# Patient Record
Sex: MALE | Race: WHITE | NOT HISPANIC OR LATINO | Employment: OTHER | URBAN - METROPOLITAN AREA
[De-identification: names, ages, dates, MRNs, and addresses within clinical notes are randomized per-mention and may not be internally consistent; named-entity substitution may affect disease eponyms.]

---

## 2018-10-04 ENCOUNTER — HOSPITAL ENCOUNTER (EMERGENCY)
Facility: HOSPITAL | Age: 83
Discharge: HOME | End: 2018-10-04
Attending: FAMILY MEDICINE | Admitting: FAMILY MEDICINE
Payer: COMMERCIAL

## 2018-10-04 VITALS
TEMPERATURE: 97.6 F | RESPIRATION RATE: 17 BRPM | HEART RATE: 88 BPM | WEIGHT: 185 LBS | HEIGHT: 64 IN | SYSTOLIC BLOOD PRESSURE: 180 MMHG | DIASTOLIC BLOOD PRESSURE: 86 MMHG | OXYGEN SATURATION: 98 % | BODY MASS INDEX: 31.58 KG/M2

## 2018-10-04 DIAGNOSIS — I10 HYPERTENSION: Primary | ICD-10-CM

## 2018-10-04 PROCEDURE — 99284 EMERGENCY DEPT VISIT MOD MDM: CPT

## 2018-10-04 PROCEDURE — 94760 N-INVAS EAR/PLS OXIMETRY 1: CPT

## 2018-10-04 PROCEDURE — 94640 AIRWAY INHALATION TREATMENT: CPT

## 2018-10-04 RX ORDER — LISINOPRIL 5 MG/1
5 TABLET ORAL DAILY
COMMUNITY

## 2018-10-04 RX ORDER — ALBUTEROL SULFATE 2.5 MG/3ML
2.5 SOLUTION RESPIRATORY (INHALATION) ONCE
Status: COMPLETED | OUTPATIENT
Start: 2018-10-04 | End: 2018-10-04

## 2018-10-04 RX ORDER — WARFARIN SODIUM 5 MG/1
5 TABLET ORAL
COMMUNITY

## 2018-10-04 RX ORDER — CLONIDINE HYDROCHLORIDE 0.1 MG/1
0.1 TABLET ORAL ONCE
Status: COMPLETED | OUTPATIENT
Start: 2018-10-04 | End: 2018-10-04

## 2018-10-04 RX ORDER — RIVASTIGMINE 4.6 MG/24H
1 PATCH, EXTENDED RELEASE TRANSDERMAL
COMMUNITY

## 2018-10-04 RX ORDER — FUROSEMIDE 20 MG/1
20 TABLET ORAL DAILY
Status: ON HOLD | COMMUNITY
End: 2018-12-13

## 2018-10-04 RX ORDER — ATORVASTATIN CALCIUM 20 MG/1
20 TABLET, FILM COATED ORAL
COMMUNITY

## 2018-10-04 RX ORDER — CLONIDINE HYDROCHLORIDE 0.1 MG/1
0.2 TABLET ORAL ONCE
Status: DISCONTINUED | OUTPATIENT
Start: 2018-10-04 | End: 2018-10-04

## 2018-10-04 RX ORDER — GINSENG 100 MG
1 CAPSULE ORAL ONCE
Status: DISCONTINUED | OUTPATIENT
Start: 2018-10-04 | End: 2018-10-04 | Stop reason: HOSPADM

## 2018-10-04 RX ORDER — ACETAMINOPHEN 325 MG/1
650 TABLET ORAL 2 TIMES DAILY
COMMUNITY

## 2018-10-04 RX ADMIN — ALBUTEROL SULFATE 2.5 MG: 2.5 SOLUTION RESPIRATORY (INHALATION) at 15:49

## 2018-10-04 RX ADMIN — CLONIDINE HYDROCHLORIDE 0.1 MG: 0.1 TABLET ORAL at 16:15

## 2018-10-04 NOTE — ED NOTES
2 ATTEMPTS MADE TO NOTIFY FACILITY ABOUT TREATMENT IN THIS ED & DISCHARGE INSTRUCTIONS, NO ANSWER   [id number] Benson Burnette RN  10/04/18 3931

## 2018-10-04 NOTE — ED PROVIDER NOTES
History  Chief Complaint   Patient presents with    Hypertension    Wheezing       History provided by:  Patient and EMS personnel   used: No    Hypertension   Severity:  Mild  Onset quality:  Sudden  Duration:  1 hour  Timing:  Constant  Progression:  Unchanged  Chronicity:  New  Time since last dose of antihypertensive: Unknown  Context: medication change    Context: not noncompliance and not stress    Relieved by:  None tried  Worsened by:  Nothing  Ineffective treatments:  None tried  Associated symptoms: no anxiety, no confusion, no dizziness, no fatigue, no fever, no palpitations, no syncope, no tinnitus, not vomiting and no weakness    Risk factors: obesity and tobacco use        Prior to Admission Medications   Prescriptions Last Dose Informant Patient Reported? Taking? ASPIRIN PO   Yes No   Sig: Take 325 mg by mouth   DOCUSATE SODIUM PO   Yes No   Sig: Take 100 mg by mouth 2 (two) times a day   acetaminophen (TYLENOL) 325 mg tablet   Yes No   Sig: Take 650 mg by mouth every 6 (six) hours   atorvastatin (LIPITOR) 20 mg tablet   Yes No   Sig: Take 20 mg by mouth   furosemide (LASIX) 20 mg tablet   Yes No   Sig: Take 20 mg by mouth   lisinopril (ZESTRIL) 2 5 mg tablet   Yes Yes   Sig: Take 2 5 mg by mouth daily   rivastigmine (EXELON) 4 6 mg/24 hr TD 24 hr patch   Yes No   Sig: Place 1 patch on the skin   warfarin (COUMADIN) 2 5 mg tablet   Yes No   Sig: Take 2 5 mg by mouth      Facility-Administered Medications: None       Past Medical History:   Diagnosis Date    Anxiety     Dementia     DJD (degenerative joint disease)     Hyperlipidemia     Hypertension        History reviewed  No pertinent surgical history  History reviewed  No pertinent family history  I have reviewed and agree with the history as documented      Social History   Substance Use Topics    Smoking status: Never Smoker    Smokeless tobacco: Never Used    Alcohol use No        Review of Systems Constitutional: Negative for fatigue and fever  HENT: Negative for tinnitus  Respiratory: Negative  Cardiovascular: Negative for palpitations and syncope  Gastrointestinal: Negative for vomiting  Genitourinary: Negative  Musculoskeletal: Negative  Neurological: Negative for dizziness and weakness  Psychiatric/Behavioral: Negative for confusion  The patient is not nervous/anxious  Physical Exam  Physical Exam   Constitutional: He is oriented to person, place, and time  He appears well-developed and well-nourished  HENT:   Head: Normocephalic and atraumatic  Neck: Normal range of motion  Neck supple  Cardiovascular: Normal rate, regular rhythm and normal heart sounds  Pulmonary/Chest: Effort normal  He has wheezes  Abdominal: Soft  Bowel sounds are normal    Neurological: He is alert and oriented to person, place, and time  Skin: Skin is warm  Nursing note and vitals reviewed  Vital Signs  ED Triage Vitals [10/04/18 1542]   Temperature Pulse Respirations Blood Pressure SpO2   97 6 °F (36 4 °C) 70 18 (!) 180/100 98 %      Temp Source Heart Rate Source Patient Position - Orthostatic VS BP Location FiO2 (%)   Tympanic Monitor Sitting Left arm --      Pain Score       No Pain           Vitals:    10/04/18 1542 10/04/18 1628   BP: (!) 180/100 156/76   Pulse: 70 68   Patient Position - Orthostatic VS: Sitting Sitting       Visual Acuity      ED Medications  Medications   albuterol inhalation solution 2 5 mg (2 5 mg Nebulization Given 10/4/18 1549)   cloNIDine (CATAPRES) tablet 0 1 mg (0 1 mg Oral Given 10/4/18 1615)       Diagnostic Studies  Results Reviewed     None                 No orders to display              Procedures  Procedures       Phone Contacts  ED Phone Contact    ED Course  ED Course as of Oct 04 1630   u Oct 04, 2018   1626 Patient blood pressure has improved patient is requesting to be discharged [facility]                                  BENOIT Paris Time    Disposition  Final diagnoses:   Hypertension     Time reflects when diagnosis was documented in both MDM as applicable and the Disposition within this note     Time User Action Codes Description Comment    10/4/2018  4:27 PM Kami, [address] Hypertension       ED Disposition     ED Disposition Condition Comment    Discharge  Asim Andrade discharge to home/self care  Condition at discharge: Stable        Follow-up Information     Follow up With Specialties Details Why [address], [address], Nurse Practitioner In 2 days If symptoms worsen [address] [place] [address]  [phone]            Patient's Medications   Discharge Prescriptions    No medications on file     No discharge procedures on file      ED Provider  Electronically Signed by           Timothy Pollock MD  10/04/18 5236

## 2018-10-04 NOTE — ED NOTES
PATIENT WAS AMBULATING WITH ED TECH TO WHEELCHAIR FOR DISCHARGE, LOST BALANCE AND WAS LOWERED TO THE FLOOR  INCIDENT WAS WITNESSED, PATIENT DID NOT HIT HEAD  NO COMPLAINTS OF PAIN OR DISCOMFORT  TWO SKIN TEARS ON RIGHT ARM OBTAINED FROM WATCH RUBBING ARM   ED PHYSICIAN ASSESSED PATIENT OKAY TO DISCHARGE PATIENT     Salma Joya RN  10/04/18 2556

## 2018-10-04 NOTE — DISCHARGE INSTRUCTIONS
Chronic Hypertension, Ambulatory Care   GENERAL INFORMATION:   Chronic hypertension  is a long-term condition in which your blood pressure (BP) is higher than normal  Your BP is the force of your blood moving against the walls of your arteries  Hypertension is a BP of 140/90 or higher  Common symptoms include the following:   · Headache     · Blurred vision    · Chest pain     · Dizziness or weakness     · Trouble breathing     · Nosebleeds  Seek immediate care for the following symptoms:   · Severe headache or vision loss    · Weakness in an arm or leg    · Confusion or difficulty speaking    · Discomfort in your chest that feels like squeezing, pressure, fullness, or pain    · Suddenly feeling lightheaded or trouble breathing    · Pain or discomfort in your back, neck, jaw, stomach, or arm  Treatment for chronic hypertension  may include medicine to lower your BP  You may also need to make lifestyle changes  Take your medicine exactly as directed  Manage chronic hypertension:   · Take your BP at home  Sit and rest for 5 minutes before you take your BP  Extend your arm and support it on a flat surface  Your arm should be at the same level as your heart  Follow the directions that came with your BP monitor  If possible, take at least 2 BP readings each time  Take your BP at least twice a day at the same times each day, such as morning and evening  Keep a log of your BP readings and bring it to your follow-up visits  · Eat less sodium (salt)  Do not add sodium to your food  Limit foods that are high in sodium, such as canned foods, potato chips, and cold cuts  Your healthcare provider may suggest that you follow the [address] [place] [address] Street  The plan is low in sodium, unhealthy fats, and total fat  It is high in potassium, calcium, and fiber  · Exercise regularly  Exercise at least 30 minutes per day, on most days of the week  This will help decrease your BP   Ask your healthcare provider about the best exercise plan for you  · Limit alcohol  Women should limit alcohol to 1 drink a day  Men should limit alcohol to 2 drinks a day  A drink of alcohol is 12 ounces of beer, 5 ounces of wine, or 1½ ounces of liquor  · Do not smoke  If you smoke, it is never too late to quit  Smoking can increase your BP  Smoking also worsens other health conditions you may have that can increase your risk for hypertension  Ask your healthcare provider for information if you need help quitting  Follow up with your healthcare provider as directed: You will need to return to have your BP checked and to have other lab tests done  Write down your questions so you remember to ask them during your visits  CARE AGREEMENT:   You have the right to help plan your care  Learn about your health condition and how it may be treated  Discuss treatment options with your caregivers to decide what care you want to receive  You always have the right to refuse treatment  The above information is an  only  It is not intended as medical advice for individual conditions or treatments  Talk to your doctor, nurse or pharmacist before following any medical regimen to see if it is safe and effective for you  © 2014 [address] is for End User's use only and may not be sold, redistributed or otherwise used for commercial purposes  All illustrations and images included in CareNotes® are the copyrighted property of [employer] M [employer]  or [employer]

## 2018-11-28 ENCOUNTER — HOSPITAL ENCOUNTER (EMERGENCY)
Facility: HOSPITAL | Age: 83
Discharge: HOME | End: 2018-11-28
Payer: COMMERCIAL

## 2018-11-28 VITALS
RESPIRATION RATE: 18 BRPM | SYSTOLIC BLOOD PRESSURE: 156 MMHG | OXYGEN SATURATION: 100 % | HEART RATE: 72 BPM | WEIGHT: 180 LBS | TEMPERATURE: 96.4 F | BODY MASS INDEX: 28.93 KG/M2 | HEIGHT: 66 IN | DIASTOLIC BLOOD PRESSURE: 88 MMHG

## 2018-11-28 DIAGNOSIS — I10 HYPERTENSION, UNSPECIFIED TYPE: Primary | ICD-10-CM

## 2018-11-28 PROCEDURE — 99283 EMERGENCY DEPT VISIT LOW MDM: CPT

## 2018-11-28 RX ORDER — CLONIDINE HYDROCHLORIDE 0.1 MG/1
0.1 TABLET ORAL ONCE
Status: COMPLETED | OUTPATIENT
Start: 2018-11-28 | End: 2018-11-28

## 2018-11-28 RX ORDER — LISINOPRIL 10 MG/1
5 TABLET ORAL ONCE
Status: DISCONTINUED | OUTPATIENT
Start: 2018-11-28 | End: 2018-11-28

## 2018-11-28 RX ORDER — CLONIDINE HYDROCHLORIDE 0.1 MG/1
0.2 TABLET ORAL ONCE
Status: COMPLETED | OUTPATIENT
Start: 2018-11-28 | End: 2018-11-28

## 2018-11-28 RX ADMIN — CLONIDINE HYDROCHLORIDE 0.2 MG: 0.1 TABLET ORAL at 08:33

## 2018-11-28 RX ADMIN — CLONIDINE HYDROCHLORIDE 0.1 MG: 0.1 TABLET ORAL at 09:13

## 2018-11-28 NOTE — DISCHARGE INSTRUCTIONS
Chronic Hypertension   WHAT YOU NEED TO KNOW:   Hypertension is high blood pressure (BP)  Your BP is the force of your blood moving against the walls of your arteries  Normal BP is less than 120/80  Prehypertension is between 120/80 and 139/89  Hypertension is 140/90 or higher  Hypertension causes your BP to get so high that your heart has to work much harder than normal  This can damage your heart  Chronic hypertension is a long-term condition that you can control with a healthy lifestyle or medicines  A controlled blood pressure helps protect your organs, such as your heart, lungs, brain, and kidneys  DISCHARGE INSTRUCTIONS:   Call 911 for any of the following:   · You have discomfort in your chest that feels like squeezing, pressure, fullness, or pain  · You become confused or have difficulty speaking  · You suddenly feel lightheaded or have trouble breathing  · You have pain or discomfort in your back, neck, jaw, stomach, or arm  Return to the emergency department if:   · You have a severe headache or vision loss  · You have weakness in an arm or leg  Contact your healthcare provider if:   · You feel faint, dizzy, confused, or drowsy  · You have been taking your BP medicine and your BP is still higher than your healthcare provider says it should be  · You have questions or concerns about your condition or care  Medicines: You may need any of the following:  · Medicine  may be used to help lower your BP  You may need more than one type of medicine  Take the medicine exactly as directed  · Diuretics  help decrease extra fluid that collects in your body  This will help lower your BP  You may urinate more often while you take this medicine  · Cholesterol medicine  helps lower your cholesterol level  A low cholesterol level helps prevent heart disease and makes it easier to control your blood pressure  · Take your medicine as directed    Contact your healthcare provider if you think your medicine is not helping or if you have side effects  Tell him or her if you are allergic to any medicine  Keep a list of the medicines, vitamins, and herbs you take  Include the amounts, and when and why you take them  Bring the list or the pill bottles to follow-up visits  Carry your medicine list with you in case of an emergency  Follow up with your healthcare provider as directed: You will need to return to have your blood pressure checked and to have other lab tests done  Write down your questions so you remember to ask them during your visits  Manage chronic hypertension:  Talk with your healthcare provider about these and other ways to manage hypertension:  · Take your BP at home  Sit and rest for 5 minutes before you take your BP  Extend your arm and support it on a flat surface  Your arm should be at the same level as your heart  Follow the directions that came with your BP monitor  If possible, take at least 2 BP readings each time  Take your BP at least twice a day at the same times each day, such as morning and evening  Keep a record of your BP readings and bring it to your follow-up visits  Ask your healthcare provider what your blood pressure should be  · Limit sodium (salt) as directed  Too much sodium can affect your fluid balance  Check labels to find low-sodium or no-salt-added foods  Some low-sodium foods use potassium salts for flavor  Too much potassium can also cause health problems  Your healthcare provider will tell you how much sodium and potassium are safe for you to have in a day  He or she may recommend that you limit sodium to 2,300 mg a day  · Follow the meal plan recommended by your healthcare provider  A dietitian or your provider can give you more information on low-sodium plans or the DASH (Dietary Approaches to Stop Hypertension) eating plan  The DASH plan is low in sodium, unhealthy fats, and total fat  It is high in potassium, calcium, and fiber  · Exercise to maintain a healthy weight  Exercise at least 30 minutes per day, on most days of the week  This will help decrease your blood pressure  Ask about the best exercise plan for you  · Decrease stress  This may help lower your BP  Learn ways to relax, such as deep breathing or listening to music  · Limit alcohol  Women should limit alcohol to 1 drink a day  Men should limit alcohol to 2 drinks a day  A drink of alcohol is 12 ounces of beer, 5 ounces of wine, or 1½ ounces of liquor  · Do not smoke  Nicotine and other chemicals in cigarettes and cigars can increase your BP and also cause lung damage  Ask your healthcare provider for information if you currently smoke and need help to quit  E-cigarettes or smokeless tobacco still contain nicotine  Talk to your healthcare provider before you use these products  © 2017 [address] Information is for End User's use only and may not be sold, redistributed or otherwise used for commercial purposes  All illustrations and images included in CareNotes® are the copyrighted property of [employer]  or [employer]  The above information is an  only  It is not intended as medical advice for individual conditions or treatments  Talk to your doctor, nurse or pharmacist before following any medical regimen to see if it is safe and effective for you  Chronic Hypertension   WHAT YOU NEED TO KNOW:   What is chronic hypertension? Hypertension is high blood pressure (BP)  Your BP is the force of your blood moving against the walls of your arteries  Normal BP is less than 120/80  Prehypertension is between 120/80 and 139/89  Hypertension is 140/90 or higher  Hypertension causes your BP to get so high that your heart has to work much harder than normal  This can damage your heart  Chronic hypertension is a long-term condition that you can control with a healthy lifestyle or medicines   A controlled blood pressure helps protect your organs, such as your heart, lungs, brain, and kidneys  What increases my risk for chronic hypertension? · Age older than 54 years (men)    · Age older than 72 years (women)    · A family history of hypertension or heart disease     · Obesity or lack of exercise     · Eating too much sodium (salt)    · Stress     · Use of tobacco, alcohol, or illegal drugs     · A medical condition, such as diabetes, high cholesterol, or kidney disease    · Medicines, such as steroids or birth control pills  What are the signs and symptoms of chronic hypertension? You may have no signs or symptoms, or you may have any of the following:  · Headache     · Blurred vision    · Chest pain     · Dizziness or weakness     · Trouble breathing     · Nosebleeds  How is chronic hypertension diagnosed? Your healthcare provider will ask about your symptoms and the medicines you take  He will also ask if you have a family history of high blood pressure and about any health conditions you have  He will also check your blood pressure and weight and examine your heart, lungs, and eyes  You may need any of the following tests:  · Blood tests  may help healthcare providers find the cause of your hypertension  Blood tests can also help find other health problems caused by hypertension  · Urine tests  will be done to check your kidneys  How is chronic hypertension treated? Your healthcare provider will recommend lifestyle changes to lower your BP  You may also need the following medicines:  · Medicine  may be used to help lower your B  You may need more than one type of medicine  Take the medicine exactly as directed  · Diuretics  help decrease extra fluid that collects in your body  This will help lower your BP  You may urinate more often while you take this medicine  · Cholesterol medicine  helps lower your cholesterol level   A low cholesterol level helps prevent heart disease and makes it easier to control your blood pressure  What can I do to manage chronic hypertension? Talk with your healthcare provider about these and other ways to manage chronic hypertension:  · Take your BP at home  Sit and rest for 5 minutes before you take your BP  Extend your arm and support it on a flat surface  Your arm should be at the same level as your heart  Follow the directions that came with your BP monitor  If possible, take at least 2 BP readings each time  Take your BP at least twice a day at the same times each day, such as morning and evening  Keep a record of your BP readings and bring it to your follow-up visits  Ask your healthcare provider what your blood pressure should be  · Limit sodium (salt) as directed  Too much sodium can affect your fluid balance  Check labels to find low-sodium or no-salt-added foods  Some low-sodium foods use potassium salts for flavor  Too much potassium can also cause health problems  Your healthcare provider will tell you how much sodium and potassium are safe for you to have in a day  He or she may recommend that you limit sodium to 2,300 mg a day  · Follow the meal plan recommended by your healthcare provider  A dietitian or your provider can give you more information on low-sodium plans or the DASH (Dietary Approaches to Stop Hypertension) eating plan  The DASH plan is low in sodium, unhealthy fats, and total fat  It is high in potassium, calcium, and fiber  · Exercise to maintain a healthy weight  Exercise at least 30 minutes per day, on most days of the week  This will help decrease your blood pressure  Ask about the best exercise plan for you  · Decrease stress  This may help lower your BP  Learn ways to relax, such as deep breathing or listening to music  · Limit alcohol  Women should limit alcohol to 1 drink a day  Men should limit alcohol to 2 drinks a day  A drink of alcohol is 12 ounces of beer, 5 ounces of wine, or 1½ ounces of liquor      · Do not smoke  Nicotine and other chemicals in cigarettes and cigars can increase your BP and also cause lung damage  Ask your healthcare provider for information if you currently smoke and need help to quit  E-cigarettes or smokeless tobacco still contain nicotine  Talk to your healthcare provider before you use these products  Call 911 for any of the following:   · You have discomfort in your chest that feels like squeezing, pressure, fullness, or pain  · You become confused or have difficulty speaking  · You suddenly feel lightheaded or have trouble breathing  · You have pain or discomfort in your back, neck, jaw, stomach, or arm  When should I seek immediate care? · You have a severe headache or vision loss  · You have weakness in an arm or leg  When should I contact my healthcare provider? · You feel faint, dizzy, confused, or drowsy  · You have been taking your BP medicine and your BP is still higher than your healthcare provider says it should be  · You have questions or concerns about your condition or care  CARE AGREEMENT:   You have the right to help plan your care  Learn about your health condition and how it may be treated  Discuss treatment options with your caregivers to decide what care you want to receive  You always have the right to refuse treatment  The above information is an  only  It is not intended as medical advice for individual conditions or treatments  Talk to your doctor, nurse or pharmacist before following any medical regimen to see if it is safe and effective for you  © 2017 [facility] Information is for End User's use only and may not be sold, redistributed or otherwise used for commercial purposes  All illustrations and images included in CareNotes® are the copyrighted property of [employer]  or [employer]

## 2018-11-28 NOTE — ED PROVIDER NOTES
History  Chief Complaint   Patient presents with    High Blood Pressure     Saint Render is a 51-year-old male who was brought to the emergency department by ambulance crew after he was noted to have high blood pressure this morning with a systolic BP of about [id number]  Patient has not taken his morning medications this morning  He denies headache, chest pain, shortness of breath or abdominal pain  History provided by:  Patient, nursing home and EMS personnel   used: No        Prior to Admission Medications   Prescriptions Last Dose Informant Patient Reported? Taking? ASPIRIN PO   Yes No   Sig: Take 325 mg by mouth   DOCUSATE SODIUM PO   Yes No   Sig: Take 100 mg by mouth 2 (two) times a day   acetaminophen (TYLENOL) 325 mg tablet   Yes No   Sig: Take 650 mg by mouth every 6 (six) hours   atorvastatin (LIPITOR) 20 mg tablet   Yes No   Sig: Take 20 mg by mouth   furosemide (LASIX) 20 mg tablet   Yes No   Sig: Take 20 mg by mouth   lisinopril (ZESTRIL) 2 5 mg tablet   Yes No   Sig: Take 5 mg by mouth daily     rivastigmine (EXELON) 4 6 mg/24 hr TD 24 hr patch   Yes No   Sig: Place 1 patch on the skin   warfarin (COUMADIN) 2 5 mg tablet   Yes No   Sig: Take 4 mg by mouth        Facility-Administered Medications: None       Past Medical History:   Diagnosis Date    Anxiety     Dementia     DJD (degenerative joint disease)     Hyperlipidemia     Hypertension        History reviewed  No pertinent surgical history  History reviewed  No pertinent family history  I have reviewed and agree with the history as documented  Social History   Substance Use Topics    Smoking status: Never Smoker    Smokeless tobacco: Never Used    Alcohol use No        Review of Systems   Constitutional: Negative  HENT: Negative  Eyes: Negative  Respiratory: Negative  Cardiovascular: Negative  Gastrointestinal: Negative  Endocrine: Negative  Genitourinary: Negative  Musculoskeletal: Negative  Allergic/Immunologic: Negative  Neurological: Negative  Hematological: Negative  Psychiatric/Behavioral: Negative  Physical Exam  Physical Exam   Constitutional: He is oriented to person, place, and time  He appears well-developed and well-nourished  No distress  HENT:   Head: Normocephalic and atraumatic  Right Ear: External ear normal    Left Ear: External ear normal    Nose: Nose normal    Mouth/Throat: Oropharynx is clear and moist  No oropharyngeal exudate  Eyes: Pupils are equal, round, and reactive to light  Conjunctivae and EOM are normal  Right eye exhibits no discharge  Left eye exhibits no discharge  Neck: Normal range of motion  Neck supple  No tracheal deviation present  No thyromegaly present  Cardiovascular: Normal rate, regular rhythm, normal heart sounds and intact distal pulses  Pulmonary/Chest: Effort normal and breath sounds normal    Abdominal: Soft  Bowel sounds are normal  He exhibits no distension  There is no tenderness  Musculoskeletal: Normal range of motion  He exhibits no edema, tenderness or deformity  Lymphadenopathy:     He has no cervical adenopathy  Neurological: He is alert and oriented to person, place, and time  No cranial nerve deficit or sensory deficit  He exhibits normal muscle tone  Coordination normal    Skin: Skin is warm and dry  No rash noted  He is not diaphoretic  No erythema  No pallor  Psychiatric: He has a normal mood and affect  His behavior is normal  Judgment and thought content normal    Nursing note and vitals reviewed        Vital Signs  ED Triage Vitals [11/28/18 0818]   Temperature Pulse Respirations Blood Pressure SpO2   (!) 96 4 °F (35 8 °C) 72 18 (!) 215/99 100 %      Temp Source Heart Rate Source Patient Position - Orthostatic VS BP Location FiO2 (%)   Temporal Monitor Lying Left arm --      Pain Score       No Pain           Vitals:    11/28/18 0818   BP: (!) 215/99   Pulse: 72 Patient Position - Orthostatic VS: Lying       Visual Acuity      ED Medications  Medications   cloNIDine (CATAPRES) tablet 0 2 mg (0 2 mg Oral Given 11/28/18 0833)   cloNIDine (CATAPRES) tablet 0 1 mg (0 1 mg Oral Given 11/28/18 0913)       Diagnostic Studies  Results Reviewed     None                 No orders to display              Procedures  Procedures       Phone Contacts  ED Phone Contact    ED Course  ED Course as of Nov 28 0950 Wed Nov 28, 2018   4050 Patient is resting comfortably  Patient is not in any distress  He is not complaining of any chest pain, shortness of breath or abdominal pain  MDM  Number of Diagnoses or Management Options  Hypertension, unspecified type: established and worsening  Risk of Complications, Morbidity, and/or Mortality  Presenting problems: low  Diagnostic procedures: low  Management options: low    Patient Progress  Patient progress: improved    CritCare Time    Disposition  Final diagnoses:   Hypertension, unspecified type     Time reflects when diagnosis was documented in both MDM as applicable and the Disposition within this note     Time User Action Codes Description Comment    11/28/2018  9:48 AM Gucci White Add [I10] Hypertension, unspecified type       ED Disposition     ED Disposition Condition Comment    Discharge  Saint Render discharge to home/self care  Condition at discharge: Good        Follow-up Information     Follow up With Specialties Details Why [address], [address] [facility], Nurse Practitioner In 2 days  [address] [place] [address]  [phone]            Patient's Medications   Discharge Prescriptions    No medications on file     No discharge procedures on file      ED Provider  Electronically Signed by           Diane Lama MD  11/28/18 5996

## 2018-11-29 ENCOUNTER — APPOINTMENT (EMERGENCY)
Dept: CT IMAGING | Facility: HOSPITAL | Age: 83
End: 2018-11-29
Payer: COMMERCIAL

## 2018-11-29 ENCOUNTER — HOSPITAL ENCOUNTER (EMERGENCY)
Facility: HOSPITAL | Age: 83
Discharge: HOME | End: 2018-11-29
Payer: COMMERCIAL

## 2018-11-29 ENCOUNTER — APPOINTMENT (EMERGENCY)
Dept: RADIOLOGY | Facility: HOSPITAL | Age: 83
End: 2018-11-29
Payer: COMMERCIAL

## 2018-11-29 VITALS
BODY MASS INDEX: 28.91 KG/M2 | OXYGEN SATURATION: 97 % | RESPIRATION RATE: 16 BRPM | SYSTOLIC BLOOD PRESSURE: 143 MMHG | DIASTOLIC BLOOD PRESSURE: 82 MMHG | HEART RATE: 47 BPM | WEIGHT: 179.9 LBS | HEIGHT: 66 IN | TEMPERATURE: 98 F

## 2018-11-29 DIAGNOSIS — R55 NEAR SYNCOPE: Primary | ICD-10-CM

## 2018-11-29 LAB
ALBUMIN SERPL BCP-MCNC: 4 G/DL (ref 3.5–5.7)
ALP SERPL-CCNC: 80 U/L (ref 55–165)
ALT SERPL W P-5'-P-CCNC: 17 U/L (ref 7–52)
ANION GAP SERPL CALCULATED.3IONS-SCNC: 10 MMOL/L (ref 4–13)
APTT PPP: 44 SECONDS (ref 26–38)
AST SERPL W P-5'-P-CCNC: 21 U/L (ref 13–39)
ATRIAL RATE: 55 BPM
BASOPHILS # BLD AUTO: 0 THOUSANDS/ΜL (ref 0–0.1)
BASOPHILS NFR BLD AUTO: 0 % (ref 0–2)
BILIRUB SERPL-MCNC: 0.4 MG/DL (ref 0.2–1)
BUN SERPL-MCNC: 52 MG/DL (ref 7–25)
CALCIUM SERPL-MCNC: 8.8 MG/DL (ref 8.6–10.5)
CHLORIDE SERPL-SCNC: 104 MMOL/L (ref 98–107)
CO2 SERPL-SCNC: 22 MMOL/L (ref 21–31)
CREAT SERPL-MCNC: 2.43 MG/DL (ref 0.7–1.3)
EOSINOPHIL # BLD AUTO: 0.1 THOUSAND/ΜL (ref 0–0.61)
EOSINOPHIL NFR BLD AUTO: 1 % (ref 0–5)
ERYTHROCYTE [DISTWIDTH] IN BLOOD BY AUTOMATED COUNT: 14.5 % (ref 11.5–14.5)
GFR SERPL CREATININE-BSD FRML MDRD: 23 ML/MIN/1.73SQ M
GLUCOSE SERPL-MCNC: 93 MG/DL (ref 65–140)
GLUCOSE SERPL-MCNC: 99 MG/DL (ref 65–99)
HCT VFR BLD AUTO: 37.4 % (ref 36.5–49.3)
HGB BLD-MCNC: 11.8 G/DL (ref 14–18)
INR PPP: 3.02 (ref 0.9–1.5)
LYMPHOCYTES # BLD AUTO: 0.9 THOUSANDS/ΜL (ref 0.6–4.47)
LYMPHOCYTES NFR BLD AUTO: 11 % (ref 21–51)
MCH RBC QN AUTO: 30.6 PG (ref 26–34)
MCHC RBC AUTO-ENTMCNC: 31.7 G/DL (ref 31–37)
MCV RBC AUTO: 97 FL (ref 81–99)
MONOCYTES # BLD AUTO: 0.8 THOUSAND/ΜL (ref 0.17–1.22)
MONOCYTES NFR BLD AUTO: 10 % (ref 2–12)
NEUTROPHILS # BLD AUTO: 6.4 THOUSANDS/ΜL (ref 1.4–6.5)
NEUTS SEG NFR BLD AUTO: 77 % (ref 42–75)
NRBC BLD AUTO-RTO: 0 /100 WBCS
PLATELET # BLD AUTO: 178 THOUSANDS/UL (ref 149–390)
PMV BLD AUTO: 9.7 FL (ref 8.6–11.7)
POTASSIUM SERPL-SCNC: 4.8 MMOL/L (ref 3.5–5.5)
PROT SERPL-MCNC: 6.5 G/DL (ref 6.4–8.9)
PROTHROMBIN TIME: 35.4 SECONDS (ref 10.2–13)
QRS AXIS: -22 DEGREES
QRSD INTERVAL: 68 MS
QT INTERVAL: 484 MS
QTC INTERVAL: 437 MS
RBC # BLD AUTO: 3.86 MILLION/UL (ref 4.3–5.9)
SODIUM SERPL-SCNC: 136 MMOL/L (ref 134–143)
T WAVE AXIS: 17 DEGREES
TROPONIN I SERPL-MCNC: <0.03 NG/ML
VENTRICULAR RATE: 49 BPM
WBC # BLD AUTO: 8.2 THOUSAND/UL (ref 4.8–10.8)

## 2018-11-29 PROCEDURE — 80053 COMPREHEN METABOLIC PANEL: CPT

## 2018-11-29 PROCEDURE — 99284 EMERGENCY DEPT VISIT MOD MDM: CPT

## 2018-11-29 PROCEDURE — 70450 CT HEAD/BRAIN W/O DYE: CPT

## 2018-11-29 PROCEDURE — 93010 ELECTROCARDIOGRAM REPORT: CPT | Performed by: INTERNAL MEDICINE

## 2018-11-29 PROCEDURE — 71045 X-RAY EXAM CHEST 1 VIEW: CPT

## 2018-11-29 PROCEDURE — 85610 PROTHROMBIN TIME: CPT

## 2018-11-29 PROCEDURE — 84484 ASSAY OF TROPONIN QUANT: CPT

## 2018-11-29 PROCEDURE — 82948 REAGENT STRIP/BLOOD GLUCOSE: CPT

## 2018-11-29 PROCEDURE — 36415 COLL VENOUS BLD VENIPUNCTURE: CPT

## 2018-11-29 PROCEDURE — 85025 COMPLETE CBC W/AUTO DIFF WBC: CPT

## 2018-11-29 PROCEDURE — 85730 THROMBOPLASTIN TIME PARTIAL: CPT

## 2018-11-29 PROCEDURE — 93005 ELECTROCARDIOGRAM TRACING: CPT

## 2018-11-29 NOTE — ED PROVIDER NOTES
History  Chief Complaint   Patient presents with    Fall     Patient fell at the [facility] while going to the bathroom  Oscar Estevez is a 80-year-old male was brought to the emergency department by ambulance crew from a local nursing home facility after he had a near syncopal episode while in the bathroom this afternoon  There was no loss of consciousness  Patient does not know why he was brought to the emergency department  He denies any pain or shortness of breath  Patient takes anticoagulant in the form of Coumadin  History provided by:  Patient, EMS personnel and nursing home   used: No    Syncope   Episode history:  Single  Most recent episode: Today  Timing:  Unable to specify  Progression:  Resolved  Chronicity:  New  Context: not blood draw, not bowel movement, not dehydration, not exertion, not inactivity, not medication change, not with normal activity, not sight of blood, not sitting down, not standing up and not urination    Witnessed: yes    Relieved by:  Nothing  Worsened by:  Nothing  Ineffective treatments:  None tried  Associated symptoms: no anxiety, no chest pain, no confusion, no diaphoresis, no difficulty breathing, no dizziness, no fever, no focal sensory loss, no focal weakness, no headaches, no malaise/fatigue, no nausea, no palpitations, no recent fall, no recent injury, no recent surgery, no rectal bleeding, no seizures, no shortness of breath, no visual change, no vomiting and no weakness        Prior to Admission Medications   Prescriptions Last Dose Informant Patient Reported? Taking?    ASPIRIN PO   Yes No   Sig: Take 325 mg by mouth   DOCUSATE SODIUM PO   Yes No   Sig: Take 100 mg by mouth 2 (two) times a day   acetaminophen (TYLENOL) 325 mg tablet   Yes No   Sig: Take 650 mg by mouth every 6 (six) hours   atorvastatin (LIPITOR) 20 mg tablet   Yes No   Sig: Take 20 mg by mouth   furosemide (LASIX) 20 mg tablet   Yes No   Sig: Take 20 mg by mouth lisinopril (ZESTRIL) 2 5 mg tablet   Yes No   Sig: Take 5 mg by mouth daily     rivastigmine (EXELON) 4 6 mg/24 hr TD 24 hr patch   Yes No   Sig: Place 1 patch on the skin   warfarin (COUMADIN) 2 5 mg tablet   Yes No   Sig: Take 4 mg by mouth        Facility-Administered Medications: None       Past Medical History:   Diagnosis Date    Anxiety     Dementia     DJD (degenerative joint disease)     Hyperlipidemia     Hypertension        No past surgical history on file  No family history on file  I have reviewed and agree with the history as documented  Social History   Substance Use Topics    Smoking status: Never Smoker    Smokeless tobacco: Never Used    Alcohol use No        Review of Systems   Constitutional: Negative for diaphoresis, fever and malaise/fatigue  HENT: Negative  Eyes: Negative  Respiratory: Negative for shortness of breath  Cardiovascular: Positive for syncope  Negative for chest pain and palpitations  Gastrointestinal: Negative for nausea and vomiting  Endocrine: Negative  Genitourinary: Negative  Musculoskeletal: Negative  Skin: Negative  Allergic/Immunologic: Negative  Neurological: Negative for dizziness, focal weakness, seizures, weakness and headaches  Hematological: Negative  Psychiatric/Behavioral: Negative for confusion  Physical Exam  Physical Exam   Constitutional: He is oriented to person, place, and time  He appears well-developed and well-nourished  No distress  HENT:   Head: Normocephalic and atraumatic  Right Ear: External ear normal    Left Ear: External ear normal    Nose: Nose normal    Mouth/Throat: Oropharynx is clear and moist  No oropharyngeal exudate  Eyes: Pupils are equal, round, and reactive to light  Conjunctivae and EOM are normal  Right eye exhibits no discharge  Left eye exhibits no discharge  No scleral icterus  Neck: Normal range of motion  Neck supple  No tracheal deviation present   No thyromegaly present  Cardiovascular: Normal rate, regular rhythm, normal heart sounds and intact distal pulses  Pulmonary/Chest: Effort normal and breath sounds normal  No respiratory distress  Abdominal: Soft  Bowel sounds are normal  He exhibits no distension  There is no tenderness  Musculoskeletal: Normal range of motion  He exhibits no edema, tenderness or deformity  Lymphadenopathy:     He has no cervical adenopathy  Neurological: He is alert and oriented to person, place, and time  No cranial nerve deficit or sensory deficit  He exhibits normal muscle tone  Coordination normal    Skin: Skin is warm and dry  No rash noted  He is not diaphoretic  No erythema  No pallor  Psychiatric: His speech is normal  Thought content normal  His mood appears anxious  He is agitated  Cognition and memory are impaired  He expresses impulsivity  Nursing note and vitals reviewed  Vital Signs  ED Triage Vitals   Temp Pulse Resp BP SpO2   -- -- -- -- --      Temp src Heart Rate Source Patient Position - Orthostatic VS BP Location FiO2 (%)   -- -- -- -- --      Pain Score       --           There were no vitals filed for this visit      Visual Acuity      ED Medications  Medications - No data to display    Diagnostic Studies  Results Reviewed     Procedure Component Value Units Date/Time    CBC and differential [[id number]]     Lab Status:  No result Specimen:  Blood     Protime-INR [[id number]]     Lab Status:  No result Specimen:  Blood     APTT [[id number]]     Lab Status:  No result Specimen:  Blood     Comprehensive metabolic panel [[id number]]     Lab Status:  No result Specimen:  Blood     Troponin I [[id number]]     Lab Status:  No result Specimen:  Blood                  CT head without contrast    (Results Pending)   XR chest 1 view portable    (Results Pending)              Procedures  Procedures       Phone Contacts  ED Phone Contact    ED Course  ED Course as of Nov 29 1858   Thu Nov 29, 2018   Jennifer Mcfarland - [facility] ED care was transferred to Dr Tamera Martinez  [facility]  CritCare Time    Disposition  Final diagnoses:   Near syncope     Time reflects when diagnosis was documented in both MDM as applicable and the Disposition within this note     Time User Action Codes Description Comment    11/29/2018  6:58 PM Ronny Cates Add [R55] Near syncope       ED Disposition     None      Follow-up Information    None         Patient's Medications   Discharge Prescriptions    No medications on file     No discharge procedures on file      ED Provider  Electronically Signed by           Saleem Basurto MD  11/29/18 5410

## 2018-11-30 NOTE — DISCHARGE INSTRUCTIONS
Syncope in Older Adults   WHAT YOU NEED TO KNOW:   Syncope is also called fainting or passing out  Syncope is a sudden, temporary loss of consciousness, followed by a fall from a standing or sitting position  A syncope episode is usually short  DISCHARGE INSTRUCTIONS:   Seek care immediately if:   · You are bleeding because you accidently hit your head after fainting  · You suddenly have double vision, difficulty speaking, numbness, and cannot move your arms or legs  · You have chest pain and trouble breathing  · You vomit blood or material that looks like coffee grounds  · You see blood in your bowel movement  Contact your healthcare provider if:   · You have another fainting spell  · You have a headache, fast heartbeat, or feel too dizzy to stand up  · You have questions or concerns about your condition or care  Follow up with your healthcare provider as directed:  Write down your questions so you remember to ask them during your visits  Manage syncope:   · Keep a record of your syncope episodes  Include your symptoms and your activity before and after the episode  The record can help your healthcare provider find the cause of your syncope and help you manage episodes  · Sit or lie down when needed  This includes when you feel dizzy, your throat is getting tight, and your vision changes  Raise your legs above the level of your heart  Your healthcare provider may also recommend that you keep the head of your bed elevated  This can help keep your blood pressure from dropping too low  · Check your blood pressure often  This is important if you take medicine to lower your blood pressure  Check your blood pressure when you are lying down and when you are standing  Ask how often to check during the day  Keep a record of your blood pressure numbers  Your healthcare provider may use the record to help plan your treatment  · Use assistive devices as directed    Your healthcare provider may suggest that you use a cane or walker to help you keep your balance  You may need to have grab bars put in your bathroom near the toilet or in the shower  Prevent a syncope episode:   · Move slowly and let yourself get used to one position before you move to another position  This is very important when you change from a lying or sitting position to a standing position  Take some deep breaths before you stand up from a lying position  Stand up slowly  Sudden movements may cause a fainting spell  Sit on the side of the bed or couch for a few minutes before you stand up  If you are on bedrest, try to be upright for about 2 hours each day, or as directed  Do not lock your legs if you are standing for a long period of time  Move your legs and bend your knees to keep blood flowing  · Follow your healthcare provider's recommendations  Your provider may  recommend that you drink more liquids to prevent dehydration  You may also need to have more salt to keep your blood pressure from dropping too low and causing syncope  Your provider will tell you how much liquid and sodium to have each day  · Watch for signs of low blood sugar  These include hunger, nervousness, sweating, and fast or fluttery heartbeats  Talk with your healthcare provider about ways to keep your blood sugar level steady  · Do not strain if you are constipated  You may faint if you strain to have a bowel movement  Walking is the best way to get your bowels moving  Eat foods high in fiber to make it easier to have a bowel movement  Good examples are high-fiber cereals, beans, vegetables, and whole-grain breads  Prune juice may help make bowel movements softer  · Be careful in hot weather  Heat can cause a syncope episode  Limit activity done outside on hot days  Physical activity in hot weather can lead to dehydration  This can cause an episode    © 2017 [employer] Information is for End User's use only and may not be sold, redistributed or otherwise used for commercial purposes  All illustrations and images included in CareNotes® are the copyrighted property of [employer]  or [employer]  The above information is an  only  It is not intended as medical advice for individual conditions or treatments  Talk to your doctor, nurse or pharmacist before following any medical regimen to see if it is safe and effective for you  Near Syncope   WHAT YOU NEED TO KNOW:   Near syncope, also called presyncope, is the feeling that you may faint (lose consciousness), but you do not  You can control some health conditions that cause near syncope  Your healthcare providers can help you create a plan to manage near syncope and prevent episodes  DISCHARGE INSTRUCTIONS:   Return to the emergency department if:   · You have sudden chest pain  · You have trouble breathing or shortness of breath  · You have vision changes, are sweating, and have nausea while you are sitting or lying down  · You feel dizzy or flushed and your heart is fluttering  · You lose consciousness  · You cannot use your arm, hand, foot, or leg, or it feels weak  · You have trouble speaking or understanding others when they speak  Contact your healthcare provider if:   · You have new or worsening symptoms  · Your heart beats faster or slower than usual      · You have questions or concerns about your condition or care  Follow up with your healthcare provider as directed: You may need more tests to help find the cause of your near syncope episodes  The tests will help healthcare providers plan the best treatment for you  Write down your questions so you remember to ask them during your visits  Manage near syncope:   · Sit or lie down when needed  This includes when you feel dizzy, your throat is getting tight, and your vision changes  Raise your legs above the level of your heart      · Take slow, deep breaths if you start to breathe faster with anxiety or fear  This can help decrease dizziness and the feeling that you might faint  · Keep a record of your near syncope episodes  Include your symptoms and your activity before and after the episode  The record can help your healthcare provider find the cause of your near syncope and help you manage episodes  Prevent a near syncope episode:   · Move slowly and let yourself get used to one position before you move to another position  This is very important when you change from a lying or sitting position to a standing position  Take some deep breaths before you stand up from a lying position  Stand up slowly  Sudden movements may cause a fainting spell  Sit on the side of the bed or couch for a few minutes before you stand up  If you are on bedrest, try to be upright for about 2 hours each day, or as directed  Do not lock your legs if you are standing for a long period of time  Move your legs and bend your knees to keep blood flowing  · Follow your healthcare provider's recommendations  Your provider may  recommend that you drink more liquids to prevent dehydration  You may also need to have more salt to keep your blood pressure from dropping too low and causing syncope  Your provider will tell you how much liquid and sodium to have each day  · Watch for signs of low blood sugar  These include hunger, nervousness, sweating, and fast or fluttery heartbeats  Talk with your healthcare provider about ways to keep your blood sugar level steady  · Check your blood pressure often  This is important if you take medicine to lower your blood pressure  Check your blood pressure when you are lying down and when you are standing  Ask how often to check during the day  Keep a record of your blood pressure numbers  Your healthcare provider may use the record to help plan your treatment  · Do not strain if you are constipated    You may faint if you strain to have a bowel movement  Walking is the best way to get your bowels moving  Eat foods high in fiber to make it easier to have a bowel movement  Good examples are high-fiber cereals, beans, vegetables, and whole-grain breads  Prune juice may help make bowel movements softer  · Do not exercise outside on a hot day  The combination of physical activity and heat can lead to dehydration  This can cause syncope  © 2017 [employer]  Information is for End User's use only and may not be sold, redistributed or otherwise used for commercial purposes  All illustrations and images included in CareNotes® are the copyrighted property of A [employer]  or [employer]  The above information is an  only  It is not intended as medical advice for individual conditions or treatments  Talk to your doctor, nurse or pharmacist before following any medical regimen to see if it is safe and effective for you

## 2018-11-30 NOTE — ED NOTES
Patient is resting comfortably  Linens were changed, and patient repositioned       Pro López RN  11/29/18 2834

## 2018-11-30 NOTE — ED PROVIDER NOTES
History  Chief Complaint   Patient presents with    Fall     Patient fell at the [facility] while going to the bathroom  HPI    Prior to Admission Medications   Prescriptions Last Dose Informant Patient Reported? Taking? ASPIRIN PO 11/29/2018 at Unknown time  Yes Yes   Sig: Take 325 mg by mouth   DOCUSATE SODIUM PO   Yes No   Sig: Take 100 mg by mouth 2 (two) times a day   acetaminophen (TYLENOL) 325 mg tablet   Yes No   Sig: Take 650 mg by mouth every 6 (six) hours   atorvastatin (LIPITOR) 20 mg tablet   Yes No   Sig: Take 20 mg by mouth   furosemide (LASIX) 20 mg tablet   Yes No   Sig: Take 20 mg by mouth   lisinopril (ZESTRIL) 2 5 mg tablet   Yes No   Sig: Take 5 mg by mouth daily     rivastigmine (EXELON) 4 6 mg/24 hr TD 24 hr patch   Yes No   Sig: Place 1 patch on the skin   warfarin (COUMADIN) 2 5 mg tablet Unknown at Unknown time  Yes No   Sig: Take 4 mg by mouth        Facility-Administered Medications: None       Past Medical History:   Diagnosis Date    Anxiety     Dementia     DJD (degenerative joint disease)     Hyperlipidemia     Hypertension     Psychiatric disorder     anxiety depresion        History reviewed  No pertinent surgical history  History reviewed  No pertinent family history  I have reviewed and agree with the history as documented      Social History   Substance Use Topics    Smoking status: Never Smoker    Smokeless tobacco: Never Used    Alcohol use No        Review of Systems    Physical Exam  Physical Exam    Vital Signs  ED Triage Vitals [11/29/18 1853]   Temperature Pulse Respirations Blood Pressure SpO2   97 6 °F (36 4 °C) 58 18 123/70 97 %      Temp Source Heart Rate Source Patient Position - Orthostatic VS BP Location FiO2 (%)   Temporal -- Lying Left arm --      Pain Score       No Pain           Vitals:    11/29/18 1853   BP: 123/70   Pulse: 58   Patient Position - Orthostatic VS: Lying       Visual Acuity  Visual Acuity      Most Recent Value   L Pupil Size (mm)  3   R Pupil Size (mm)  3   L Pupil Shape  Round   R Pupil Shape  Round          ED Medications  Medications - No data to display    Diagnostic Studies  Results Reviewed     Procedure Component Value Units Date/Time    CBC and differential [[id number]]     Lab Status:  No result Specimen:  Blood     Protime-INR [[id number]]     Lab Status:  No result Specimen:  Blood     APTT [[id number]]     Lab Status:  No result Specimen:  Blood     Comprehensive metabolic panel [[id number]]     Lab Status:  No result Specimen:  Blood     Troponin I [[id number]]     Lab Status:  No result Specimen:  Blood                  CT head without contrast    (Results Pending)   XR chest 1 view portable    (Results Pending)              Procedures  ECG 12 Lead Documentation  Date/Time: 11/29/2018 7:02 PM  Performed by: Li Neff  Authorized by: Ryan AGUILAR     ECG reviewed by me, the ED Provider: yes    Patient location:  ED  Previous ECG:     Previous ECG:  Unavailable    Comparison to cardiac monitor: No    Interpretation:     Interpretation: abnormal    Rate:     ECG rate:  50    ECG rate assessment: bradycardic    Rhythm:     Rhythm: sinus bradycardia    Ectopy:     Ectopy: none    QRS:     QRS axis:  Normal    QRS intervals:  Normal  ST segments:     ST segments:  Normal  T waves:     T waves: normal             Phone Contacts  ED Phone Contact    ED Course   This patient has been signed out by Dr Pricila Jarrett  Co the outgoing emergency department physician  Reference is made to the medical record composed by Dr Pricila Jarrett,  Entries to the record by this emergency physician are for the purposes of follow-up studies to include laboratories and CT scans as well as disposition  AT 8:45PM, THE PATIENT'S STFDIES RETURNED UNREMARKABLE  THE CT SCAN OF THE BRAIN AND PLAIN XRAYS OF THE CHEST RETURNED UNREMARKABLE                            [facility]  CritCare Time    Disposition  Final diagnoses:   Near syncope     Time reflects when diagnosis was documented in both MDM as applicable and the Disposition within this note     Time User Action Codes Description Comment    11/29/2018  6:58 PM Marjorie Cates Add [R55] Near syncope       ED Disposition     None      Follow-up Information    None         Patient's Medications   Discharge Prescriptions    No medications on file     No discharge procedures on file      ED Provider  Electronically Signed by           Candida Shah MD  11/29/18 Ben Ruelas MD  11/29/18 1916       Candida Shah MD  11/29/18 2050

## 2018-12-05 ENCOUNTER — APPOINTMENT (EMERGENCY)
Dept: CT IMAGING | Facility: HOSPITAL | Age: 83
End: 2018-12-05
Payer: COMMERCIAL

## 2018-12-05 ENCOUNTER — HOSPITAL ENCOUNTER (INPATIENT)
Facility: HOSPITAL | Age: 83
LOS: 8 days | Discharge: NURSING, REHAB OR LONG-TERM CARE | DRG: 552 | End: 2018-12-13
Attending: SURGERY | Admitting: SURGERY
Payer: COMMERCIAL

## 2018-12-05 ENCOUNTER — HOSPITAL ENCOUNTER (EMERGENCY)
Facility: HOSPITAL | Age: 83
End: 2018-12-05
Attending: EMERGENCY MEDICINE
Payer: COMMERCIAL

## 2018-12-05 VITALS
HEART RATE: 76 BPM | SYSTOLIC BLOOD PRESSURE: 145 MMHG | OXYGEN SATURATION: 98 % | DIASTOLIC BLOOD PRESSURE: 72 MMHG | TEMPERATURE: 98.4 F | RESPIRATION RATE: 16 BRPM

## 2018-12-05 DIAGNOSIS — S32.009A CLOSED FRACTURE OF LUMBAR VERTEBRAL BODY (HCC): Primary | ICD-10-CM

## 2018-12-05 DIAGNOSIS — S32.009A: Primary | ICD-10-CM

## 2018-12-05 DIAGNOSIS — G30.1 LATE ONSET ALZHEIMER'S DISEASE WITHOUT BEHAVIORAL DISTURBANCE (HCC): ICD-10-CM

## 2018-12-05 DIAGNOSIS — S22.009A: ICD-10-CM
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
## 2018-12-05 DIAGNOSIS — F02.80 LATE ONSET ALZHEIMER'S DISEASE WITHOUT BEHAVIORAL DISTURBANCE (HCC): ICD-10-CM

## 2018-12-05 DIAGNOSIS — R26.2 AMBULATORY DYSFUNCTION: ICD-10-CM

## 2018-12-05 PROBLEM — S22.000A COMPRESSION FRACTURE OF BODY OF THORACIC VERTEBRA (HCC): Status: ACTIVE | Noted: 2018-12-05

## 2018-12-05 PROBLEM — S30.1XXA TRAUMATIC ECCHYMOSIS OF FLANK: Status: ACTIVE | Noted: 2018-12-05

## 2018-12-05 PROBLEM — D68.9 COAGULOPATHY (HCC): Status: ACTIVE | Noted: 2018-12-05

## 2018-12-05 LAB
ANION GAP SERPL CALCULATED.3IONS-SCNC: 9 MMOL/L (ref 4–13)
APTT PPP: 37 SECONDS (ref 26–38)
ATRIAL RATE: 104 BPM
BASOPHILS # BLD AUTO: 0 THOUSANDS/ΜL (ref 0–0.1)
BASOPHILS NFR BLD AUTO: 1 % (ref 0–2)
BUN SERPL-MCNC: 45 MG/DL (ref 7–25)
CALCIUM SERPL-MCNC: 9 MG/DL (ref 8.6–10.5)
CHLORIDE SERPL-SCNC: 104 MMOL/L (ref 98–107)
CK SERPL-CCNC: 95 U/L (ref 30–308)
CO2 SERPL-SCNC: 26 MMOL/L (ref 21–31)
CREAT SERPL-MCNC: 1.69 MG/DL (ref 0.7–1.3)
EOSINOPHIL # BLD AUTO: 0.1 THOUSAND/ΜL (ref 0–0.61)
EOSINOPHIL NFR BLD AUTO: 1 % (ref 0–5)
ERYTHROCYTE [DISTWIDTH] IN BLOOD BY AUTOMATED COUNT: 14.3 % (ref 11.5–14.5)
GFR SERPL CREATININE-BSD FRML MDRD: 35 ML/MIN/1.73SQ M
GLUCOSE SERPL-MCNC: 96 MG/DL (ref 65–99)
HCT VFR BLD AUTO: 36.9 % (ref 36.5–49.3)
HGB BLD-MCNC: 11.7 G/DL (ref 14–18)
INR PPP: 2.83 (ref 0.9–1.5)
LYMPHOCYTES # BLD AUTO: 0.7 THOUSANDS/ΜL (ref 0.6–4.47)
LYMPHOCYTES NFR BLD AUTO: 9 % (ref 21–51)
MCH RBC QN AUTO: 30.6 PG (ref 26–34)
MCHC RBC AUTO-ENTMCNC: 31.7 G/DL (ref 31–37)
MCV RBC AUTO: 97 FL (ref 81–99)
MONOCYTES # BLD AUTO: 1 THOUSAND/ΜL (ref 0.17–1.22)
MONOCYTES NFR BLD AUTO: 12 % (ref 2–12)
NEUTROPHILS # BLD AUTO: 6.6 THOUSANDS/ΜL (ref 1.4–6.5)
NEUTS SEG NFR BLD AUTO: 78 % (ref 42–75)
NRBC BLD AUTO-RTO: 0 /100 WBCS
PLATELET # BLD AUTO: 224 THOUSANDS/UL (ref 149–390)
PMV BLD AUTO: 9.5 FL (ref 8.6–11.7)
POTASSIUM SERPL-SCNC: 5.2 MMOL/L (ref 3.5–5.5)
PROTHROMBIN TIME: 33.2 SECONDS (ref 10.2–13)
QRS AXIS: -40 DEGREES
QRSD INTERVAL: 66 MS
QT INTERVAL: 376 MS
QTC INTERVAL: 447 MS
RBC # BLD AUTO: 3.82 MILLION/UL (ref 4.3–5.9)
SODIUM SERPL-SCNC: 139 MMOL/L (ref 134–143)
T WAVE AXIS: 46 DEGREES
TROPONIN I SERPL-MCNC: <0.03 NG/ML
VENTRICULAR RATE: 85 BPM
WBC # BLD AUTO: 8.5 THOUSAND/UL (ref 4.8–10.8)

## 2018-12-05 PROCEDURE — 93010 ELECTROCARDIOGRAM REPORT: CPT | Performed by: INTERNAL MEDICINE

## 2018-12-05 PROCEDURE — 85610 PROTHROMBIN TIME: CPT | Performed by: EMERGENCY MEDICINE

## 2018-12-05 PROCEDURE — 90715 TDAP VACCINE 7 YRS/> IM: CPT | Performed by: EMERGENCY MEDICINE

## 2018-12-05 PROCEDURE — 99222 1ST HOSP IP/OBS MODERATE 55: CPT | Performed by: SURGERY

## 2018-12-05 PROCEDURE — 85025 COMPLETE CBC W/AUTO DIFF WBC: CPT | Performed by: EMERGENCY MEDICINE

## 2018-12-05 PROCEDURE — 85730 THROMBOPLASTIN TIME PARTIAL: CPT | Performed by: EMERGENCY MEDICINE

## 2018-12-05 PROCEDURE — 71250 CT THORAX DX C-: CPT

## 2018-12-05 PROCEDURE — 84484 ASSAY OF TROPONIN QUANT: CPT | Performed by: EMERGENCY MEDICINE

## 2018-12-05 PROCEDURE — 80048 BASIC METABOLIC PNL TOTAL CA: CPT | Performed by: EMERGENCY MEDICINE

## 2018-12-05 PROCEDURE — 99285 EMERGENCY DEPT VISIT HI MDM: CPT

## 2018-12-05 PROCEDURE — 36415 COLL VENOUS BLD VENIPUNCTURE: CPT | Performed by: EMERGENCY MEDICINE

## 2018-12-05 PROCEDURE — 72125 CT NECK SPINE W/O DYE: CPT

## 2018-12-05 PROCEDURE — 82550 ASSAY OF CK (CPK): CPT | Performed by: EMERGENCY MEDICINE

## 2018-12-05 PROCEDURE — 74176 CT ABD & PELVIS W/O CONTRAST: CPT

## 2018-12-05 PROCEDURE — 96374 THER/PROPH/DIAG INJ IV PUSH: CPT

## 2018-12-05 PROCEDURE — 93005 ELECTROCARDIOGRAM TRACING: CPT

## 2018-12-05 RX ORDER — MORPHINE SULFATE 4 MG/ML
4 INJECTION, SOLUTION INTRAMUSCULAR; INTRAVENOUS EVERY 4 HOURS PRN
Status: DISCONTINUED | OUTPATIENT
Start: 2018-12-05 | End: 2018-12-06

## 2018-12-05 RX ORDER — LORAZEPAM 0.5 MG/1
TABLET ORAL 3 TIMES DAILY
COMMUNITY
End: 2018-12-13 | Stop reason: HOSPADM

## 2018-12-05 RX ORDER — FUROSEMIDE 20 MG/1
20 TABLET ORAL DAILY
Status: DISCONTINUED | OUTPATIENT
Start: 2018-12-06 | End: 2018-12-11

## 2018-12-05 RX ORDER — DOCUSATE SODIUM 100 MG/1
100 CAPSULE, LIQUID FILLED ORAL 2 TIMES DAILY
Status: DISCONTINUED | OUTPATIENT
Start: 2018-12-05 | End: 2018-12-13 | Stop reason: HOSPADM

## 2018-12-05 RX ORDER — LIDOCAINE 50 MG/G
1 PATCH TOPICAL DAILY
Status: DISCONTINUED | OUTPATIENT
Start: 2018-12-06 | End: 2018-12-05

## 2018-12-05 RX ORDER — LISINOPRIL 5 MG/1
5 TABLET ORAL DAILY
Status: DISCONTINUED | OUTPATIENT
Start: 2018-12-06 | End: 2018-12-11

## 2018-12-05 RX ORDER — METHOCARBAMOL 500 MG/1
500 TABLET, FILM COATED ORAL EVERY 6 HOURS PRN
Status: DISCONTINUED | OUTPATIENT
Start: 2018-12-05 | End: 2018-12-06

## 2018-12-05 RX ORDER — ACETAMINOPHEN 325 MG/1
650 TABLET ORAL EVERY 6 HOURS
Status: DISCONTINUED | OUTPATIENT
Start: 2018-12-05 | End: 2018-12-06

## 2018-12-05 RX ORDER — MEMANTINE HYDROCHLORIDE 5 MG-10 MG
28 KIT ORAL SEE ADMIN INSTRUCTIONS
Status: ON HOLD | COMMUNITY
End: 2018-12-06 | Stop reason: CLARIF

## 2018-12-05 RX ORDER — RIVASTIGMINE 4.6 MG/24H
1 PATCH, EXTENDED RELEASE TRANSDERMAL DAILY
Status: DISCONTINUED | OUTPATIENT
Start: 2018-12-06 | End: 2018-12-13 | Stop reason: HOSPADM

## 2018-12-05 RX ORDER — WARFARIN SODIUM 2 MG/1
4 TABLET ORAL
Status: DISCONTINUED | OUTPATIENT
Start: 2018-12-05 | End: 2018-12-13 | Stop reason: HOSPADM

## 2018-12-05 RX ORDER — ATORVASTATIN CALCIUM 20 MG/1
20 TABLET, FILM COATED ORAL
Status: DISCONTINUED | OUTPATIENT
Start: 2018-12-06 | End: 2018-12-13 | Stop reason: HOSPADM

## 2018-12-05 RX ORDER — LIDOCAINE 50 MG/G
1 PATCH TOPICAL DAILY
Status: DISCONTINUED | OUTPATIENT
Start: 2018-12-05 | End: 2018-12-13 | Stop reason: HOSPADM

## 2018-12-05 RX ORDER — SODIUM CHLORIDE 9 MG/ML
125 INJECTION, SOLUTION INTRAVENOUS CONTINUOUS
Status: DISCONTINUED | OUTPATIENT
Start: 2018-12-05 | End: 2018-12-05 | Stop reason: HOSPADM

## 2018-12-05 RX ORDER — LIDOCAINE 4 G/G
PATCH TOPICAL
COMMUNITY
End: 2019-03-06

## 2018-12-05 RX ORDER — ASPIRIN 81 MG/1
324 TABLET, CHEWABLE ORAL DAILY
Status: DISCONTINUED | OUTPATIENT
Start: 2018-12-06 | End: 2018-12-13 | Stop reason: HOSPADM

## 2018-12-05 RX ADMIN — ACETAMINOPHEN 650 MG: 325 TABLET, FILM COATED ORAL at 22:02

## 2018-12-05 RX ADMIN — TETANUS TOXOID, REDUCED DIPHTHERIA TOXOID AND ACELLULAR PERTUSSIS VACCINE, ADSORBED 0.5 ML: 5; 2.5; 8; 8; 2.5 SUSPENSION INTRAMUSCULAR at 21:59

## 2018-12-05 RX ADMIN — SODIUM CHLORIDE 125 ML/HR: 0.9 INJECTION, SOLUTION INTRAVENOUS at 18:32

## 2018-12-05 RX ADMIN — DOCUSATE SODIUM 100 MG: 100 CAPSULE, LIQUID FILLED ORAL at 22:02

## 2018-12-05 RX ADMIN — MORPHINE SULFATE 4 MG: 4 INJECTION, SOLUTION INTRAMUSCULAR; INTRAVENOUS at 21:57

## 2018-12-05 RX ADMIN — LIDOCAINE 1 PATCH: 50 PATCH CUTANEOUS at 22:05

## 2018-12-05 RX ADMIN — MORPHINE SULFATE 2 MG: 2 INJECTION, SOLUTION INTRAMUSCULAR; INTRAVENOUS at 17:06

## 2018-12-05 NOTE — EMTALA/ACUTE CARE TRANSFER
[facility] [address]   [id number]  [phone]  Dept: [phone]      [address] TRANSFER CONSENT    NAME Nicholas Ricketts                                         1927                              MRN [id number]    I have been informed of my rights regarding examination, treatment, and transfer   by Dr Jessica Reyes MD    Benefits: Specialized equipment and/or services available at the receiving facility (Include comment)________________________ (trauma)    Risks: Potential deterioration of medical condition, Increased discomfort during transfer      Consent for Transfer:  I acknowledge that my medical condition has been evaluated and explained to me by the emergency department physician or other qualified medical person and/or my attending physician, who has recommended that I be transferred to the service of  Accepting Physician: Dr Latasha Lester at [facility] Name, [address] : [facility]  The above potential benefits of such transfer, the potential risks associated with such transfer, and the probable risks of not being transferred have been explained to me, and I fully understand them  The doctor has explained that, in my case, the benefits of transfer outweigh the risks  I agree to be transferred  I authorize the performance of emergency medical procedures and treatments upon me in both transit and upon arrival at the receiving facility  Additionally, I authorize the release of any and all medical records to the receiving facility and request they be transported with me, if possible  I understand that the safest mode of transportation during a medical emergency is an ambulance and that the Hospital advocates the use of this mode of transport   Risks of traveling to the receiving facility by car, including absence of medical control, life sustaining equipment, such as oxygen, and medical personnel has been explained to me and I fully understand them  ([place] CORRECT BOX BELOW)  [  ]  I consent to the stated transfer and to be transported by ambulance/helicopter  [  ]  I consent to the stated transfer, but refuse transportation by ambulance and accept full responsibility for my transportation by car  I understand the risks of non-ambulance transfers and I exonerate the Hospital and its staff from any deterioration in my condition that results from this refusal     X___________________________________________    DATE  18  TIME________  Signature of patient or legally responsible individual signing on patient behalf           RELATIONSHIP TO PATIENT_________________________          Provider Certification    NAME Roberth Alonso                                        [facility] 1927                              MRN [id number]    A medical screening exam was performed on the above named patient  Based on the examination:    Condition Necessitating Transfer The primary encounter diagnosis was Fracture lumbar vertebra-closed ([address] )  A diagnosis of Fracture, thoracic vertebra (HCC) was also pertinent to this visit      Patient Condition: The patient has been stabilized such that within reasonable medical probability, no material deterioration of the patient condition or the condition of the unborn child(hanna) is likely to result from the transfer    Reason for Transfer: Level of Care needed not available at this facility (Trauma)    Transfer Requirements: [address]   · Space available and qualified personnel available for treatment as acknowledged by [address] [place] [address]  · Agreed to accept transfer and to provide appropriate medical treatment as acknowledged by       Dr Garett Franklin  · Appropriate medical records of the examination and treatment of the patient are provided at the time of transfer   [facility],[address] _______  · Transfer will be performed by qualified personnel from [facility]  and appropriate transfer equipment as required, including the use of necessary and appropriate life support measures  Provider Certification: I have examined the patient and explained the following risks and benefits of being transferred/refusing transfer to the patient/family:  General risk, such as traffic hazards, adverse weather conditions, rough terrain or turbulence, possible failure of equipment (including vehicle or aircraft), or consequences of actions of persons outside the control of the transport personnel, Risk of worsening condition, The possibility of a transport vehicle being unavailable      Based on these reasonable risks and benefits to the patient and/or the unborn child(hanna), and based upon the information available at the time of the patients examination, I certify that the medical benefits reasonably to be expected from the provision of appropriate medical treatments at another medical facility outweigh the increasing risks, if any, to the individuals medical condition, and in the case of labor to the unborn child, from effecting the transfer      X____________________________________________ DATE 12/05/18        TIME_______      ORIGINAL - SEND TO MEDICAL RECORDS   COPY - SEND WITH PATIENT DURING TRANSFER

## 2018-12-05 NOTE — ED PROVIDER NOTES
History  Chief Complaint   Patient presents with    Back Pain     80-year-old male sent from [facility] with intractable back pain status post fall several days ago with an indeterminate age T10 fracture  Patient complains of mid to low back pain with some radiation into his thighs  No focal weakness or numbness  No urinary incontinence  Patient denies headache or neck pain  No chest pain or shortness of breath  No abdominal pain  No nausea vomiting  Noon dysuria, no fever, no cough  Pt had CT head and labs 11/29 and outpatient spine x-rays 12/4 showing T10 Fx  Pt unable to walk secondary to pain        History provided by:  Patient  Back Pain   Location:  Lumbar spine and thoracic spine  Quality:  Aching  Timing:  Constant  Relieved by:  Nothing  Worsened by: Movement  Associated symptoms: no abdominal pain, no bladder incontinence, no bowel incontinence, no chest pain, no dysuria, no fever, no headaches, no numbness, no paresthesias and no weakness        Prior to Admission Medications   Prescriptions Last Dose Informant Patient Reported? Taking? ASPIRIN PO   Yes No   Sig: Take 325 mg by mouth   DOCUSATE SODIUM PO   Yes No   Sig: Take 100 mg by mouth 2 (two) times a day   LORazepam (ATIVAN) 0 5 mg tablet   Yes Yes   Sig: Take by mouth 3 (three) times a day   Lidocaine (ASPERCREME LIDOCAINE) 4 % PTCH   Yes Yes   Sig: Apply topically   acetaminophen (TYLENOL) 325 mg tablet   Yes Yes   Sig: Take 650 mg by mouth every 6 (six) hours   atorvastatin (LIPITOR) 20 mg tablet   Yes Yes   Sig: Take 20 mg by mouth   furosemide (LASIX) 20 mg tablet   Yes Yes   Sig: Take 20 mg by mouth   lisinopril (ZESTRIL) 2 5 mg tablet   Yes Yes   Sig: Take 5 mg by mouth daily     memantine (NAMENDA TITRATION PACK)   Yes Yes   Sig: Take 28 mg by mouth see administration instructions Follow package directions     rivastigmine (EXELON) 4 6 mg/24 hr TD 24 hr patch   Yes Yes   Sig: Place 1 patch on the skin   sertraline (ZOLOFT) 50 mg tablet   Yes Yes   Sig: Take 50 mg by mouth daily   warfarin (COUMADIN) 2 5 mg tablet   Yes Yes   Sig: Take 4 mg by mouth        Facility-Administered Medications: None       Past Medical History:   Diagnosis Date    Anxiety     Dementia     DJD (degenerative joint disease)     Hyperlipidemia     Hypertension     Psychiatric disorder     anxiety depresion        History reviewed  No pertinent surgical history  History reviewed  No pertinent family history  I have reviewed and agree with the history as documented  Social History   Substance Use Topics    Smoking status: Never Smoker    Smokeless tobacco: Never Used    Alcohol use No        Review of Systems   Constitutional: Negative for chills and fever  HENT: Negative for rhinorrhea and sore throat  Eyes: Negative for visual disturbance  Respiratory: Negative for cough and shortness of breath  Cardiovascular: Negative for chest pain and leg swelling  Gastrointestinal: Negative for abdominal pain, bowel incontinence, diarrhea, nausea and vomiting  Genitourinary: Negative for bladder incontinence, dysuria and frequency  No incontinence   Musculoskeletal: Positive for back pain  Negative for myalgias and neck pain  Skin:        Healing abrasions   Neurological: Negative for dizziness, syncope, facial asymmetry, speech difficulty, weakness, light-headedness, numbness, headaches and paresthesias  Psychiatric/Behavioral: Negative for confusion  All other systems reviewed and are negative  Physical Exam  Physical Exam   Constitutional: He is oriented to person, place, and time  He appears well-developed and well-nourished  HENT:   Head: Normocephalic and atraumatic  Nose: Nose normal    Mouth/Throat: No oropharyngeal exudate  Mucous membranes slightly dry, pharynx is clear without erythema   Eyes: Pupils are equal, round, and reactive to light  Conjunctivae and EOM are normal  No scleral icterus     Neck: Neck supple  No JVD present  No tracheal deviation present  C-spine nontender without bony step-off   Cardiovascular: Normal rate  Murmur (2/6 systolic murmur) heard  Irregularly irregular   Pulmonary/Chest: Effort normal and breath sounds normal  No respiratory distress  He has no wheezes  He has no rales  He exhibits no tenderness  Abdominal: Soft  Bowel sounds are normal  There is no tenderness  There is no guarding  No pulsatile mass   Musculoskeletal: Normal range of motion  He exhibits no edema, tenderness or deformity  Old ecchymosis right hip, good range of motion and nontender; patient tender low T-spine and lumbar spine; negative straight leg raise   Neurological: He is alert and oriented to person, place, and time  No cranial nerve deficit or sensory deficit  He exhibits normal muscle tone  5/5 motor, nl sens   Skin: Skin is warm and dry  Healing abrasions and skin tears right upper extremity   Psychiatric: He has a normal mood and affect  His behavior is normal    Nursing note and vitals reviewed        Vital Signs  ED Triage Vitals [12/05/18 1542]   Temperature Pulse Respirations Blood Pressure SpO2   98 4 °F (36 9 °C) 76 16 145/72 98 %      Temp src Heart Rate Source Patient Position - Orthostatic VS BP Location FiO2 (%)   -- -- -- -- --      Pain Score       --           Vitals:    12/05/18 1542   BP: 145/72   Pulse: 76       Visual Acuity      ED Medications  Medications   sodium chloride 0 9 % infusion (not administered)   morphine injection 2 mg (2 mg Intravenous Given 12/5/18 1706)       Diagnostic Studies  Results Reviewed     Procedure Component Value Units Date/Time    Troponin I [[id number]]  (Normal) Collected:  12/05/18 1628    Lab Status:  Final result Specimen:  Blood from Arm, Right Updated:  12/05/18 1657     Troponin I <0 03 ng/mL     CK Total with Reflex CKMB [[id number]]  (Normal) Collected:  12/05/18 1628    Lab Status:  Final result Specimen:  Blood from Arm, Right Updated:  12/05/18 1653     Total CK 95 U/L     Basic metabolic panel [[id number]]  (Abnormal) Collected:  12/05/18 1628    Lab Status:  Final result Specimen:  Blood from Arm, Right Updated:  12/05/18 1653     Sodium 139 mmol/L      Potassium 5 2 mmol/L      Chloride 104 mmol/L      CO2 26 mmol/L      ANION GAP 9 mmol/L      BUN 45 (H) mg/dL      Creatinine 1 69 (H) mg/dL      Glucose 96 mg/dL      Calcium 9 0 mg/dL      eGFR 35 ml/min/1 73sq m     Narrative:         National Kidney Disease Education Program recommendations are as follows:  GFR calculation is accurate only with a steady state creatinine  Chronic Kidney disease less than 60 ml/min/1 73 sq  meters  Kidney failure less than 15 ml/min/1 73 sq  meters      APTT [[id number]]  (Normal) Collected:  12/05/18 1628    Lab Status:  Final result Specimen:  Blood from Arm, Right Updated:  12/05/18 1648     PTT 37 seconds     Protime-INR [[id number]]  (Abnormal) Collected:  12/05/18 1628    Lab Status:  Final result Specimen:  Blood from Arm, Right Updated:  12/05/18 1648     Protime 33 2 (H) seconds      INR 2 83 (H)    CBC and differential [[id number]]  (Abnormal) Collected:  12/05/18 1628    Lab Status:  Final result Specimen:  Blood from Arm, Right Updated:  12/05/18 1635     WBC 8 50 Thousand/uL      RBC 3 82 (L) Million/uL      Hemoglobin 11 7 (L) g/dL      Hematocrit 36 9 %      MCV 97 fL      MCH 30 6 pg      MCHC 31 7 g/dL      RDW 14 3 %      MPV 9 5 fL      Platelets 764 Thousands/uL      nRBC 0 /100 WBCs      Neutrophils Relative 78 (H) %      Lymphocytes Relative 9 (L) %      Monocytes Relative 12 %      Eosinophils Relative 1 %      Basophils Relative 1 %      Neutrophils Absolute 6 60 (H) Thousands/µL      Lymphocytes Absolute 0 70 Thousands/µL      Monocytes Absolute 1 00 Thousand/µL      Eosinophils Absolute 0 10 Thousand/µL      Basophils Absolute 0 00 Thousands/µL     UA w Reflex to Microscopic [[id number]]     Lab Status:  No result Specimen:  Urine CT chest abdomen pelvis wo contrast   Final Result by Chino Sheikh (12/05 9565)   Chest: No acute cardiopulmonary disease  Abdomen: Acute comminuted fracture at L1, L2 and L3 which appears to   extend into the posterior elements concerning for unstable fracture  High-grade compression fracture at T10 with anterior wedging suggesting   acute fracture  No acute inflammatory changes  Pelvis: Sigmoid colon diverticulosis without evidence of diverticulitis         Critical findings discussed with and acknowledged by Dr Kay Post at   5:31 PM on 12/5/2018  Signed by Salma Lubin DO      CT cervical spine without contrast   Final Result by Ca Guillen (12/05 5438)   Impression:      Spondylotic changes and facet arthrosis  No findings of fracture or   traumatic listhesis  Varying degrees of chronic mild central stenosis and   and mild to moderately severe foraminal narrowing as above  Signed by Ca Guillen MD                 Procedures  Procedures       Phone Contacts  ED Phone Contact    ED Course  ED Course as of Dec 05 1758   Wed Dec 05, 2018   1627 EKG AFib 85bpm, nl QRS, NSSTTW changes    1745 D/w radiologist - Fxs L1-L3 with extension into posterior elements;  Fx T10    1747 D/w [facility] transfer center and trauma - Dr Larkin Door - accepts in transfer                                MDM  CritCare Time    Disposition  Final diagnoses:   Fracture lumbar vertebra-closed ([address] )   Fracture, thoracic vertebra ([address] )     Time reflects when diagnosis was documented in both MDM as applicable and the Disposition within this note     Time User Action Codes Description Comment    12/5/2018  5:36 PM Ed MARTINEZ Add [S32 009A] Fracture lumbar vertebra-closed ([address] )     12/5/2018  5:36 PM Ed MARTINEZ Add [M43 077T] Fracture, thoracic vertebra [facility])       ED Disposition     ED Disposition Condition Comment    Transfer to [address] should be transferred out to [facility]  MD Documentation      Most Recent Value   Patient Condition  The patient has been stabilized such that within reasonable medical probability, no material deterioration of the patient condition or the condition of the unborn child(hanna) is likely to result from the transfer   Reason for Transfer  Level of Care needed not available at this facility [Trauma]   Benefits of Transfer  Specialized equipment and/or services available at the receiving facility (Include comment)________________________ [trauma]   Risks of Transfer  Potential deterioration of medical condition, Increased discomfort during transfer   Accepting Physician  Andrés Figueroa    (Name & Tel number)  [address]   Transported by (Company and Unit #)  [facility]   Sending MD Amarilis Vasques MD   Provider Certification  General risk, such as traffic hazards, adverse weather conditions, rough terrain or turbulence, possible failure of equipment (including vehicle or aircraft), or consequences of actions of persons outside the control of the transport personnel, Risk of worsening condition, The possibility of a transport vehicle being unavailable      RN Documentation      Most [facility] Andrés Roberts    (Name & Tel number)  [address]   Transported by [employer] and Unit #)  [facility]      Follow-up Information    None         Patient's Medications   Discharge Prescriptions    No medications on file     No discharge procedures on file      ED Provider  Electronically Signed by           Rogers Vuong MD  12/05/18 David Enamorado

## 2018-12-06 ENCOUNTER — APPOINTMENT (INPATIENT)
Dept: RADIOLOGY | Facility: HOSPITAL | Age: 83
DRG: 552 | End: 2018-12-06
Payer: COMMERCIAL
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
## 2018-12-06 PROBLEM — H91.90: Status: ACTIVE | Noted: 2018-12-06

## 2018-12-06 PROBLEM — Z91.89: Status: ACTIVE | Noted: 2018-12-06

## 2018-12-06 PROBLEM — H91.90 HOH (HARD OF HEARING): Status: ACTIVE | Noted: 2018-12-06

## 2018-12-06 PROBLEM — F03.90 DEMENTIA (HCC): Status: ACTIVE | Noted: 2018-12-06

## 2018-12-06 LAB
ANION GAP SERPL CALCULATED.3IONS-SCNC: 7 MMOL/L (ref 4–13)
BASOPHILS # BLD AUTO: 0.03 THOUSANDS/ΜL (ref 0–0.1)
BASOPHILS NFR BLD AUTO: 0 % (ref 0–1)
BUN SERPL-MCNC: 43 MG/DL (ref 5–25)
CALCIUM SERPL-MCNC: 9.2 MG/DL (ref 8.3–10.1)
CHLORIDE SERPL-SCNC: 108 MMOL/L (ref 100–108)
CO2 SERPL-SCNC: 24 MMOL/L (ref 21–32)
CREAT SERPL-MCNC: 1.66 MG/DL (ref 0.6–1.3)
EOSINOPHIL # BLD AUTO: 0.18 THOUSAND/ΜL (ref 0–0.61)
EOSINOPHIL NFR BLD AUTO: 3 % (ref 0–6)
ERYTHROCYTE [DISTWIDTH] IN BLOOD BY AUTOMATED COUNT: 13.1 % (ref 11.6–15.1)
GFR SERPL CREATININE-BSD FRML MDRD: 35 ML/MIN/1.73SQ M
GLUCOSE SERPL-MCNC: 82 MG/DL (ref 65–140)
HCT VFR BLD AUTO: 35.1 % (ref 36.5–49.3)
HGB BLD-MCNC: 10.6 G/DL (ref 12–17)
IMM GRANULOCYTES # BLD AUTO: 0.03 THOUSAND/UL (ref 0–0.2)
IMM GRANULOCYTES NFR BLD AUTO: 0 % (ref 0–2)
INR PPP: 2.16 (ref 0.86–1.17)
LYMPHOCYTES # BLD AUTO: 1.27 THOUSANDS/ΜL (ref 0.6–4.47)
LYMPHOCYTES NFR BLD AUTO: 18 % (ref 14–44)
MCH RBC QN AUTO: 30.5 PG (ref 26.8–34.3)
MCHC RBC AUTO-ENTMCNC: 30.2 G/DL (ref 31.4–37.4)
MCV RBC AUTO: 101 FL (ref 82–98)
MONOCYTES # BLD AUTO: 0.87 THOUSAND/ΜL (ref 0.17–1.22)
MONOCYTES NFR BLD AUTO: 13 % (ref 4–12)
NEUTROPHILS # BLD AUTO: 4.56 THOUSANDS/ΜL (ref 1.85–7.62)
NEUTS SEG NFR BLD AUTO: 66 % (ref 43–75)
NRBC BLD AUTO-RTO: 0 /100 WBCS
PLATELET # BLD AUTO: 200 THOUSANDS/UL (ref 149–390)
PMV BLD AUTO: 11.2 FL (ref 8.9–12.7)
POTASSIUM SERPL-SCNC: 4.3 MMOL/L (ref 3.5–5.3)
PROTHROMBIN TIME: 24.2 SECONDS (ref 11.8–14.2)
RBC # BLD AUTO: 3.48 MILLION/UL (ref 3.88–5.62)
SODIUM SERPL-SCNC: 139 MMOL/L (ref 136–145)
WBC # BLD AUTO: 6.94 THOUSAND/UL (ref 4.31–10.16)

## 2018-12-06 PROCEDURE — 80048 BASIC METABOLIC PNL TOTAL CA: CPT | Performed by: EMERGENCY MEDICINE

## 2018-12-06 PROCEDURE — 70450 CT HEAD/BRAIN W/O DYE: CPT

## 2018-12-06 PROCEDURE — 99223 1ST HOSP IP/OBS HIGH 75: CPT | Performed by: NEUROLOGICAL SURGERY

## 2018-12-06 PROCEDURE — 36415 COLL VENOUS BLD VENIPUNCTURE: CPT | Performed by: EMERGENCY MEDICINE

## 2018-12-06 PROCEDURE — 72080 X-RAY EXAM THORACOLMB 2/> VW: CPT

## 2018-12-06 PROCEDURE — 87081 CULTURE SCREEN ONLY: CPT | Performed by: SURGERY

## 2018-12-06 PROCEDURE — 85025 COMPLETE CBC W/AUTO DIFF WBC: CPT | Performed by: EMERGENCY MEDICINE

## 2018-12-06 PROCEDURE — 99222 1ST HOSP IP/OBS MODERATE 55: CPT | Performed by: FAMILY MEDICINE

## 2018-12-06 PROCEDURE — 85610 PROTHROMBIN TIME: CPT | Performed by: EMERGENCY MEDICINE

## 2018-12-06 PROCEDURE — 99232 SBSQ HOSP IP/OBS MODERATE 35: CPT | Performed by: PHYSICIAN ASSISTANT

## 2018-12-06 RX ORDER — ACETAMINOPHEN 325 MG/1
975 TABLET ORAL EVERY 8 HOURS SCHEDULED
Status: DISCONTINUED | OUTPATIENT
Start: 2018-12-06 | End: 2018-12-13 | Stop reason: HOSPADM

## 2018-12-06 RX ORDER — MELATONIN
1000 DAILY
Status: DISCONTINUED | OUTPATIENT
Start: 2018-12-07 | End: 2018-12-13 | Stop reason: HOSPADM

## 2018-12-06 RX ORDER — OXYCODONE HYDROCHLORIDE 5 MG/1
2.5 TABLET ORAL EVERY 4 HOURS PRN
Status: DISCONTINUED | OUTPATIENT
Start: 2018-12-06 | End: 2018-12-12

## 2018-12-06 RX ORDER — OXYCODONE HYDROCHLORIDE 5 MG/1
5 TABLET ORAL EVERY 4 HOURS PRN
Status: DISCONTINUED | OUTPATIENT
Start: 2018-12-06 | End: 2018-12-12

## 2018-12-06 RX ORDER — IBUPROFEN 200 MG
TABLET ORAL 2 TIMES DAILY PRN
COMMUNITY

## 2018-12-06 RX ORDER — MEMANTINE HYDROCHLORIDE 5 MG/1
10 TABLET ORAL DAILY
Status: DISCONTINUED | OUTPATIENT
Start: 2018-12-06 | End: 2018-12-13 | Stop reason: HOSPADM

## 2018-12-06 RX ORDER — GUAIFENESIN/DEXTROMETHORPHAN 100-10MG/5
5 SYRUP ORAL EVERY 6 HOURS PRN
COMMUNITY
End: 2019-03-06

## 2018-12-06 RX ORDER — ACETAMINOPHEN 325 MG/1
650 TABLET ORAL EVERY 6 HOURS PRN
COMMUNITY
End: 2018-12-13 | Stop reason: HOSPADM

## 2018-12-06 RX ORDER — METHOCARBAMOL 500 MG/1
250 TABLET, FILM COATED ORAL EVERY 6 HOURS PRN
Status: DISCONTINUED | OUTPATIENT
Start: 2018-12-06 | End: 2018-12-08

## 2018-12-06 RX ORDER — MEMANTINE HYDROCHLORIDE 28 MG/1
28 CAPSULE, EXTENDED RELEASE ORAL DAILY
COMMUNITY

## 2018-12-06 RX ORDER — LORAZEPAM 0.5 MG/1
0.25 TABLET ORAL 2 TIMES DAILY
Status: DISCONTINUED | OUTPATIENT
Start: 2018-12-06 | End: 2018-12-13 | Stop reason: HOSPADM

## 2018-12-06 RX ADMIN — LIDOCAINE 1 PATCH: 50 PATCH CUTANEOUS at 13:39

## 2018-12-06 RX ADMIN — FUROSEMIDE 20 MG: 20 TABLET ORAL at 09:51

## 2018-12-06 RX ADMIN — WARFARIN SODIUM 4 MG: 2 TABLET ORAL at 18:29

## 2018-12-06 RX ADMIN — RIVASTIGMINE 1 PATCH: 4.6 PATCH TRANSDERMAL at 09:55

## 2018-12-06 RX ADMIN — MEMANTINE 10 MG: 5 TABLET ORAL at 13:37

## 2018-12-06 RX ADMIN — SERTRALINE HYDROCHLORIDE 50 MG: 50 TABLET ORAL at 09:51

## 2018-12-06 RX ADMIN — ACETAMINOPHEN 975 MG: 325 TABLET, FILM COATED ORAL at 13:37

## 2018-12-06 RX ADMIN — OXYCODONE HYDROCHLORIDE 5 MG: 5 TABLET ORAL at 18:29

## 2018-12-06 RX ADMIN — DOCUSATE SODIUM 100 MG: 100 CAPSULE, LIQUID FILLED ORAL at 13:37

## 2018-12-06 RX ADMIN — ATORVASTATIN CALCIUM 20 MG: 20 TABLET, FILM COATED ORAL at 18:28

## 2018-12-06 RX ADMIN — LORAZEPAM 0.25 MG: 0.5 TABLET ORAL at 09:43

## 2018-12-06 RX ADMIN — ASPIRIN 81 MG 324 MG: 81 TABLET ORAL at 09:50

## 2018-12-06 RX ADMIN — LORAZEPAM 0.25 MG: 0.5 TABLET ORAL at 18:29

## 2018-12-06 RX ADMIN — DOCUSATE SODIUM 100 MG: 100 CAPSULE, LIQUID FILLED ORAL at 18:29

## 2018-12-06 RX ADMIN — LISINOPRIL 5 MG: 5 TABLET ORAL at 09:51

## 2018-12-06 RX ADMIN — ACETAMINOPHEN 975 MG: 325 TABLET, FILM COATED ORAL at 21:30

## 2018-12-06 NOTE — ORTHOTIC NOTE
Orthotic Note            Date: 12/6/2018      Patient Name: Nile Babinski            Reason for Consult:  Patient Active Problem List   Diagnosis    Traumatic ecchymosis of flank    Coagulopathy ([address] )    Closed fracture of lumbar vertebral body ([address] )    Compression fracture of body of thoracic vertebra ([address] )    Ambulatory dysfunction     (15 minutes)    Don[place] TLSO Brace to pt while pt was supine in bed  TLSO brace set to a size large for an optimal fit/comfort  Pt tolerated fitting well  RN assisted in rolling pt to make sure brace was on properly  Instructions and adjustments were reviewed during this time  Will continue to follow up daily  TLSO brace at bedside along with contact information  RN aware  Recommendations:  Please call ext  [phone] with questions regarding bracing instruction and or adjustment(s)      Damián Wagoner Restorative Technician, BS

## 2018-12-06 NOTE — CONSULTS
Consultation - Geriatrics   Patience Mayorga 80 y o  male MRN: [id number]  Unit/Bed#: ED 13 Encounter: [id number]      Assessment/Plan  1  Fall  Fall precautions  Home meds including ativan, namenda, could contribute to falls  Will reduce ativan to 0 25mg BID  Will reduce namenda to 10mg daily   Recommend vitamin D3 1000 IU daily     2  Dementia  Generally A*O x self  Sometimes place  Generally pleasant per daughter  Spoke with facility, they report patient was receiving Ativan 0 5 mg t i d   Will reduce to 0 25 mg b i d , with goal to taper, recommend continuing this medication for next 5 days, then 0 25 mg daily for 5 days, then stop, daughter and nursing facility aware of this recommendation  Will reduce patient's Namenda to 10 mg daily, facility and daughter aware of this recommendation  Continue supportive care    3  Ambulatory dysfunction  Patient ambulates with rolled walker at baseline  PT, OT once appropriate  Patient would benefit from rehab to improve gait stability and strength    4  Social isolation  Related to patient's difficulty with hearing  Spoke with daughter about having patient seen by audiologist at [place] [address], she was agreeable  Patient's daughter was given number to schedule appointment  Patient was able to hear this would improve mood    5  Deconditioning  Secondary to injury, hospital stay  Mobilize frequently to prevent further decline  PT, OT    6  Hard of hearing  Patient has hearing aids, but daughter reports they have not been working for the last 2 years  Recommend he see an audiologist at [place] [address] upon discharge    7   Delirium precautions  Patient high risk for delirium with underlying dementia  Will schedule Tylenol 975 mg Q 8  Continue with Lidoderm patch  Will add oxycodone 2 5, 5 mg p r n  Q 4 for moderate, severe pain  Will reduce Robaxin to 250 mg p r n  Q 6 for muscle spasms  Will discontinue morphine , would recommend using Dilaudid 0 2 mg IV p r n  Q 4  Continue with Colace 100 mg b i d  Redirect unwanted patient behaviors as first line tx  Reorient patient frequently  Avoid deliriogenic meds including tramadol, benzodiazepines, benadryl  Good sleep hygiene important, limit night time interruptions  Encourage patient to stay awake during the day  Ensure adequate hydration/nutrition  Mobilize often     8  T10 compression fracture, L1-L3 complex vertebral body fractures  Neurosurgery consult, TLSO brace  Daughter does not want to pursue any type of surgery    History of Present Illness   Physician Requesting Consult: Kieran Aguila MD  Reason for Consult / Principal Problem: isar  Hx and PE limited by: n/a  HPI: Madai Hand is a 80y o  year old male with past medical history dementia, anxiety, who presents to [place] [address] after falling  Home meds include Ativan 0 5 mg t i d , Namenda, Exelon patch, Zoloft  Patient was found to have T10 compression fracture, complex L1, L2, L3 vertebral body fractures, flank ecchymosis, ambulatory dysfunction  Patient was admitted under the trauma team   Neurosurgery was consulted, recommendations pending  PTA patient [facility]  He has assistance with ADLS, IADLS  Ambulates with RW  Is generally A*O x person, sometimes place  Daughter reports he is generally pleasant, but has had difficulty over the years as he cannot hear well, this has led to isolation  She reports he still recognizes family, but does at times forget that his wife has since passed away, when family visit patient, patient often goes to look for wife, they have to remind him that she   He has some awareness that his memory is not good, and does make comments to family about this  Spoke to staff at [facility], they report patient was receiving Ativan 0 5 mg t i d , as well as Namenda 28 mg daily  I told them these medications would both be reduced, and Ativan should be tapered, they understood      Upon exam patient is pleasant, alert and oriented to self  Says he is going to work at 1:00 p m  Harsha Douglas Does know it is December, but does not know where he is, or the year  He says we are waiting for [facility] to come  Inpatient consult to Gerontology  Consult performed by: Jennyfer Max ordered by: Love STONE          Review of Systems   Unable to perform ROS: Dementia       Historical Information   Past Medical History:   Diagnosis Date    Anxiety     Dementia     DJD (degenerative joint disease)     Hyperlipidemia     Hypertension     Psychiatric disorder     anxiety depresion      History reviewed  No pertinent surgical history  Social History   History   Alcohol Use No     History   Drug Use No     History   Smoking Status    Never Smoker   Smokeless Tobacco    Never Used         Family History: non-contributory    Meds/Allergies   Current meds:   Current Facility-Administered Medications   Medication Dose Route Frequency    acetaminophen (TYLENOL) tablet 650 mg  650 mg Oral Q6H    aspirin chewable tablet 324 mg  324 mg Oral Daily    atorvastatin (LIPITOR) tablet 20 mg  20 mg Oral Daily With Dinner    docusate sodium (COLACE) capsule 100 mg  100 mg Oral BID    furosemide (LASIX) tablet 20 mg  20 mg Oral Daily    lidocaine (LIDODERM) 5 % patch 1 patch  1 patch Topical Daily    lisinopril (ZESTRIL) tablet 5 mg  5 mg Oral Daily    methocarbamol (ROBAXIN) tablet 500 mg  500 mg Oral Q6H PRN    morphine (PF) 4 mg/mL injection 4 mg  4 mg Intravenous Q4H PRN    rivastigmine (EXELON) 4 6 mg/24 hr TD 24 hr patch 1 patch  1 patch Transdermal Daily    sertraline (ZOLOFT) tablet 50 mg  50 mg Oral Daily    warfarin (COUMADIN) tablet 4 mg  4 mg Oral Daily (warfarin)      Current PTA meds:  (Not in a hospital admission)     No Known Allergies    Objective   Vitals: Blood pressure 124/68, pulse 78, temperature 98 7 °F (37 1 °C), resp   rate 18, height 5' 6" (1 676 m), weight 81 6 kg (179 lb 14 3 oz), SpO2 96 % ,Body mass index is 29 04 kg/m²  Physical Exam   Constitutional: He appears well-developed and well-nourished  No distress  HENT:   Head: Normocephalic and atraumatic  Mouth/Throat: No oropharyngeal exudate  Eyes: Conjunctivae and EOM are normal  No scleral icterus  Neck: Neck supple  Cardiovascular: Normal rate  Pulmonary/Chest: Effort normal and breath sounds normal  He has no wheezes  He has no rales  Abdominal: Soft  Bowel sounds are normal  He exhibits no distension  Musculoskeletal: He exhibits no edema  Neurological: He is alert  Skin: Skin is warm and dry  Psychiatric: He has a normal mood and affect  His behavior is normal  Thought content normal  He is not agitated and not actively hallucinating  Cognition and memory are impaired  He expresses inappropriate judgment  He exhibits abnormal recent memory and abnormal remote memory  Dementia at baseline  A*O x self  Pleasant  No signs of delirium  Nursing note and vitals reviewed  Lab Results:   Results from last 7 days  Lab Units 12/06/18  0620   WBC Thousand/uL 6 94   HEMOGLOBIN g/dL 10 6*   HEMATOCRIT % 35 1*   PLATELETS Thousands/uL 200        Results from last 7 days  Lab Units 12/06/18  0620  11/29/18  1918   POTASSIUM mmol/L 4 3  < > 4 8   CHLORIDE mmol/L 108  < > 104   CO2 mmol/L 24  < > 22   BUN mg/dL 43*  < > 52*   CREATININE mg/dL 1 66*  < > 2 43*   CALCIUM mg/dL 9 2  < > 8 8   ALK PHOS U/L  --   --  80   ALT U/L  --   --  17   AST U/L  --   --  21   < > = values in this interval not displayed  Imaging Studies: I have personally reviewed pertinent reports  EKG, Pathology, and Other Studies: I have personally reviewed pertinent reports      VTE Prophylaxis: Sequential compression device (Venodyne)     Code Status: Level 1 - Full Code

## 2018-12-06 NOTE — UTILIZATION REVIEW
[address] [facility] Utilization Review Department  Phone: [phone]; Fax [phone]  [email or web address]  ATTENTION: Please call with any questions or concerns to [phone]  and carefully listen to the prompts so that you are directed to the right person  Send all requests for admission clinical reviews, approved or denied determinations and any other requests to fax [phone]  All voicemails are confidential   Initial Clinical Review    Admission: Date/Time/Statement: 12/5/18 @ 2026     Orders Placed This Encounter   Procedures    Inpatient Admission     Standing Status:   Standing     Number of Occurrences:   1     Order Specific Question:   Admitting Physician     Answer:   Wesley Leal [[id number]]     Order Specific Question:   Level of Care     Answer:   Med Surg [16]     Order Specific Question:   Estimated length of stay     Answer:   More than 2 Midnights     Order Specific Question:   Certification     Answer:   I certify that inpatient services are medically necessary for this patient for a duration of greater than two midnights  See H&P and MD Progress Notes for additional information about the patient's course of treatment  ED: Date/Time/Mode of Arrival:   ED Arrival Information     Expected Arrival Acuity Means of Arrival Escorted By Service Admission Type    12/5/2018 12/5/2018 19:25 Emergent Ambulance [address] [facility] Trauma Emergency    Arrival Complaint    Trauma          Chief Complaint:   Chief Complaint   Patient presents with    Fall     Pt is a trauma tx  Pt lives at [facility]  Pt had 3 falls in the past week  Pt was dx with a T10, and L1 to L3 fx  Pt has a hx of dementia   Pt was last given 2mg of Morphine at 1706 prior to arrival         History of Illness: 80 yr old male fransfers from [facility] --fell on 11/29 and has intractable back pain since -- work up done in [facility] significant for t10, l1-3 vertebral fx        ED Vital Signs:   ED Triage Vitals   Temperature Pulse Respirations Blood Pressure SpO2   12/05/18 1930 12/05/18 1930 12/05/18 1930 12/05/18 1930 12/05/18 1930   98 7 °F (37 1 °C) 88 18 156/67 96 %      Temp Source Heart Rate Source Patient Position - Orthostatic VS BP Location FiO2 (%)   12/06/18 1145 12/05/18 2100 12/05/18 1945 12/06/18 0000 --   Oral Monitor Lying Left arm       Pain Score       12/05/18 1953       8        Wt Readings from Last 1 Encounters:   12/06/18 85 5 kg (188 lb 9 6 oz)       Vital Signs (abnormal): Abnormal Labs/Diagnostic Test Results: bun 45--cr 1 69----hgb 11 7--inr 2 83  § CT CAP done on 12/5/2018 demonstrates comminuted fracture at L1-L2 and L3 which appears extend into the posterior elements concerning for unstable fracture  High-grade compression fracture at T10 with anterior wedging suggesting acute fracture    ED Treatment:   Medication Administration from 12/05/2018 1916 to 12/06/2018 1142       Date/Time Order Dose Route Action Action by Comments     12/06/2018 1125 acetaminophen (TYLENOL) tablet 650 mg   Oral Canceled Entry Jinny Regan RN      12/06/2018 0309 acetaminophen (TYLENOL) tablet 650 mg 650 mg Oral Not Given Tom Sterling RN      12/05/2018 2202 acetaminophen (TYLENOL) tablet 650 mg 650 mg Oral Given Jamilah Almendarez RN      12/06/2018 9260 aspirin chewable tablet 324 mg 324 mg Oral Given Nissa Lima RN      12/05/2018 2202 docusate sodium (COLACE) capsule 100 mg 100 mg Oral Given Jamilah Almendarez RN      12/06/2018 8977 furosemide (LASIX) tablet 20 mg 20 mg Oral Given Nissa Lima RN      12/06/2018 0951 lisinopril (ZESTRIL) tablet 5 mg 5 mg Oral Given Nissa Lima RN      12/06/2018 0955 rivastigmine (EXELON) 4 6 mg/24 hr TD 24 hr patch 1 patch 1 patch Transdermal Medication Applied Jinny Regan RN left upper back on shoulder     12/06/2018 0951 sertraline (ZOLOFT) tablet 50 mg 50 mg Oral Given Peterson MARTINEZ Kelli Hammans, RN      12/05/2018 2154 warfarin (COUMADIN) tablet 4 mg 4 mg Oral Not Given Na Joya RN Per Dr Maximo Ghotra do not give this dose of Coumadin  Pt to start coumadin tomorrow  12/05/2018 2205 lidocaine (LIDODERM) 5 % patch 1 patch 1 patch Topical Medication Applied Na Joya RN      12/05/2018 2157 morphine (PF) 4 mg/mL injection 4 mg 4 mg Intravenous Given Na Joya RN      12/05/2018 2159 tetanus-diphtheria-acellular pertussis (BOOSTRIX) IM injection 0 5 mL 0 5 mL Intramuscular Given Na Joya RN      12/06/2018 0943 LORazepam (ATIVAN) tablet 0 25 mg 0 25 mg Oral Given Ernesto Feliciano RN           Past Medical/Surgical History: Active Ambulatory Problems     Diagnosis Date Noted    No Active Ambulatory Problems     Resolved Ambulatory Problems     Diagnosis Date Noted    No Resolved Ambulatory Problems     Past Medical History:   Diagnosis Date    Anxiety     Dementia     DJD (degenerative joint disease)     Hyperlipidemia     Hypertension     Psychiatric disorder        Admitting Diagnosis: Ambulatory dysfunction [R26 2]  Closed fracture of lumbar vertebral body ([facility] [address] ) [S32 009A]  Unspecified multiple injuries, initial encounter [T07  XXXA]    Age/Sex: 80 y o  male    Assessment/Plan:  t10 ls 1-3 vertebral fx from fall on 11//29 with intractable pain--flank echhymosis  Ambulatory dysfunction  Pt/ot when cleared  Pain control  Neurosurgery consult   tlso brace   Gerontology consult    Admission Orders:  Scheduled Meds:   Current Facility-Administered Medications:  acetaminophen 975 mg Oral Q8H [facility] Mia Guerrero PA-C   aspirin 324 mg Oral Daily Ladena Messing, DO   atorvastatin 20 mg Oral Daily With [employer], DO   [START ON 12/7/2018] cholecalciferol 1,000 Units Oral Daily Nahed Olivo, DO   docusate sodium 100 mg Oral BID Dev BERTHA Davis, DO   furosemide 20 mg Oral Daily Ladena Messing, DO   lidocaine 1 patch Topical Daily Ladena Messing, DO lisinopril 5 mg Oral Daily Dev Davis, DO   LORazepam 0 25 mg Oral BID Bob Chain, PA-C   memantine 10 mg Oral Daily Bob Chain, PA-C   methocarbamol 250 mg Oral Q6H PRN Bob Chain, PA-C   oxyCODONE 2 5 mg Oral Q4H PRN Bob Chain, PA-C   oxyCODONE 5 mg Oral Q4H PRN Bob Chain, PA-C   rivastigmine 1 patch Transdermal Daily Amisha Zhao,    sertraline 50 mg Oral Daily Amisha Zhao DO   warfarin 4 mg Oral Daily (warfarin) Amisha Zhao,      Continuous Infusions:    PRN Meds: methocarbamol    oxyCODONE    oxyCODONE    Neurosurgery --thoracospine x-rays in brace   -- brace and pain control  Gerontology advised reduced dose of med

## 2018-12-06 NOTE — PHYSICAL THERAPY NOTE
Physical Therapy Cancellation Note    PT orders received, chart review performed  Attempted to see pt at 1500, pt off the floor at x-ray  PT to follow up as félix Muse Seen, PT, DPT

## 2018-12-06 NOTE — CONSULTS
Consultation - Neurosurgery   Minna Pulido 80 y o  male MRN: [id number]  Unit/Bed#: ED 13 Encounter: [id number]      Inpatient consult to Neurosurgery  Consult performed by: Adelita Sullivan ordered by: Nathaniel STONE          Assessment/Plan                 Assessment:  1  Communited  fractures of L1-3 with extension to posterior elements  2  Acute severe compression fracture of T10 with anterior wedging  3  History of dementia  4  Ambulatory dysfunction  5  History of AFib on warfarin and aspirin-coagulopathy      Plan:   · Exam: GCS14, Alert and awake, but disoriented x3, PERRL, EOMI, conjugate, SF, finger-to-nose test is normal and without drift bilaterally  Strength is 5/5 throughout  SLR test illicit pain in the left hip , on the left side lower back pain on the right side  No radiculopathy  Sensations to LT is normal-but limited because of patient's cognitive status  DTR 3+ upper extremities, 2+ in the lower extremities bilaterally  No clonus and Babinski is negative bilaterally  · Images: CT CAP done on 12/5/2018 demonstrates comminuted fracture at L1-L2 and L3 which appears extend into the posterior elements concerning for unstable fracture  High-grade compression fracture at T10 with anterior wedging suggesting acute fracture  · Pain control: per primary team  · DVT ppx:   · SCDs  · Patient on aspirin and warfarin  · Activity: As tolerated  · PT/OT evaluation & treatment  · Brace: Wear TLSO brace when upright and at >45 degree HOB  · Medical Mx: Per primary team  · Neurocheck:routine  · NSx  Following the patient by neuromonitoring and reviewing images  Baseline Upright Thoracolumbar spine x rays in brace  ordered-results pending  Continue wearing brace and pain control   Call for concern or problem      HPI: Minna Pulido is a 80y o  year old male with PMHx of significant dementia, admitted with intractable back pain status post fall few weeks ago,CT CAP demonstrates age-indeterminate comminuted fractures of L1, L2, L3, and also severe compression fracture of T10 with possible posterior extension  Difficulty walking because of pain  Difficult to get a thorough and reliable history because of his dementia  I talked to his daughter on the phone, and she stayed patient lives in SNF, the patient has history of falls 3 times in the past 1 week-complaining of progressive pain in the lower back  She states patient has gait issues and uses walker for walking  Denies any numbness, weakness in the extremities  Patient is on warfarin and Coumadin his multiple bruises in his arms  Patient claims he has history of hypertension but controlled now  Denies any history of diabetes mellitus, congestive heart failure, stroke, seizure, fever, cough walk, chest pain, nausea or vomiting  Patient denies history of smoking cigarettes or drinking alcohol  CT CAP demonstrates comminuted fractures of of L1 to 3, severe compression fracture of T10 with possible posterior extension  Review of Systems   Constitutional: Negative for activity change, chills and fever  HENT: Negative for trouble swallowing and voice change  Eyes: Negative for photophobia and visual disturbance  Respiratory: Negative for chest tightness  Gastrointestinal: Negative for constipation, nausea and vomiting  Genitourinary: Negative for difficulty urinating  Musculoskeletal: Positive for back pain and gait problem  Skin: Negative for wound  Neurological: Negative for dizziness, tremors, seizures, syncope, facial asymmetry, speech difficulty, weakness, light-headedness, numbness and headaches  Hematological: Bruises/bleeds easily  Psychiatric/Behavioral: Positive for confusion and decreased concentration         Historical Information   Past Medical History:   Diagnosis Date    Anxiety     Dementia     DJD (degenerative joint disease)     Hyperlipidemia     Hypertension     Psychiatric disorder     anxiety depresion      History reviewed  No pertinent surgical history  History   Alcohol Use No     History   Drug Use No     History   Smoking Status    Never Smoker   Smokeless Tobacco    Never Used     History reviewed  No pertinent family history  Meds/Allergies   all current active meds have been reviewed and current meds:   Current Facility-Administered Medications   Medication Dose Route Frequency    acetaminophen (TYLENOL) tablet 975 mg  975 mg Oral Q8H [facility]    aspirin chewable tablet 324 mg  324 mg Oral Daily    atorvastatin (LIPITOR) tablet 20 mg  20 mg Oral Daily With Dinner    docusate sodium (COLACE) capsule 100 mg  100 mg Oral BID    furosemide (LASIX) tablet 20 mg  20 mg Oral Daily    lidocaine (LIDODERM) 5 % patch 1 patch  1 patch Topical Daily    lisinopril (ZESTRIL) tablet 5 mg  5 mg Oral Daily    LORazepam (ATIVAN) tablet 0 25 mg  0 25 mg Oral BID    memantine (NAMENDA) tablet 10 mg  10 mg Oral Daily    methocarbamol (ROBAXIN) tablet 250 mg  250 mg Oral Q6H PRN    oxyCODONE (ROXICODONE) IR tablet 2 5 mg  2 5 mg Oral Q4H PRN    oxyCODONE (ROXICODONE) IR tablet 5 mg  5 mg Oral Q4H PRN    rivastigmine (EXELON) 4 6 mg/24 hr TD 24 hr patch 1 patch  1 patch Transdermal Daily    sertraline (ZOLOFT) tablet 50 mg  50 mg Oral Daily    warfarin (COUMADIN) tablet 4 mg  4 mg Oral Daily (warfarin)     No Known Allergies    Objective   I/O       12/04 0701 - 12/05 0700 12/05 0701 - 12/06 0700 12/06 0701 - 12/07 0700    Urine (mL/kg/hr)  200     Total Output   200      Net   -200                   Physical Exam   Constitutional: He appears well-developed and well-nourished  HENT:   Head: Normocephalic and atraumatic  Eyes: Pupils are equal, round, and reactive to light  Conjunctivae and EOM are normal    Neck: Normal range of motion  Cardiovascular: Regular rhythm  Pulmonary/Chest: Effort normal and breath sounds normal    Abdominal: Soft  Musculoskeletal: Normal range of motion  Neurological: He is alert  He has normal strength  He has a normal Finger-Nose-Finger Test  GCS eye subscore is 4  GCS verbal subscore is 4  GCS motor subscore is 6  He displays no Babinski's sign on the right side  He displays no Babinski's sign on the left side  Reflex Scores:       Tricep reflexes are 3+ on the right side and 3+ on the left side  Bicep reflexes are 3+ on the right side  Brachioradialis reflexes are 3+ on the right side and 3+ on the left side  Patellar reflexes are 2+ on the right side and 2+ on the left side  Achilles reflexes are 2+ on the right side and 2+ on the left side  Patient is alert but disoriented  x3   Skin: Skin is warm  Psychiatric: His speech is normal      Neurologic Exam     Mental Status   Disoriented to person  Disoriented to place  Disoriented to country, city and area  Disoriented to year and date  Oriented to month  Registration: recalls 3 of 3 objects  Recall of objects at 5 minutes: None  Attention: decreased  Concentration: normal    Speech: speech is normal   Knowledge: poor  Unable to perform simple calculations  Able to name object  Cranial Nerves     CN II   Visual fields full to confrontation  Right visual field deficit: none  Left visual field deficit: none     CN III, IV, VI   Pupils are equal, round, and reactive to light  Extraocular motions are normal    Right pupil: Size: 2 mm  Shape: regular  Reactivity: brisk  Left pupil: Size: 2 mm  Shape: regular  Reactivity: brisk  Nystagmus: none     CN VII   Right facial weakness: none  Left facial weakness: none    CN XI   CN XI normal      CN XII   CN XII normal      Motor Exam   Muscle bulk: normal  Overall muscle tone: normal  Right arm tone: normal  Left arm tone: normal  Right arm pronator drift: absent  Left arm pronator drift: absent  Right leg tone: normal  Left leg tone: normal    Strength   Strength 5/5 throughout       Sensory Exam   Right arm light touch: normal  Left arm light touch: normal  Pinprick normal    Some exam limited because of patient is dementia and some hearing issues     Gait, Coordination, and Reflexes     Coordination   Finger to nose coordination: normal    Tremor   Intention tremor: present    Reflexes   Right brachioradialis: 3+  Left brachioradialis: 3+  Right biceps: 3+  Right triceps: 3+  Left triceps: 3+  Right patellar: 2+  Left patellar: 2+  Right achilles: 2+  Left achilles: 2+  Right : 2+  Left : 2+  Right plantar: normal  Left plantar: normal  Right Kennedy: absent  Left Kennedy: absent  Right ankle clonus: absent  Left ankle clonus: absent      Vitals:Blood pressure 124/68, pulse 78, temperature 98 7 °F (37 1 °C), resp  rate 18, height 5' 6" (1 676 m), weight 81 6 kg (179 lb 14 3 oz), SpO2 96 %  ,Body mass index is 29 04 kg/m²  Lab Results:     Results from last 7 days  Lab Units 12/06/18  0620 12/05/18 1628 11/29/18  1921   WBC Thousand/uL 6 94 8 50 8 20   HEMOGLOBIN g/dL 10 6* 11 7* 11 8*   HEMATOCRIT % 35 1* 36 9 37 4   PLATELETS Thousands/uL 200 224 178   NEUTROS PCT % 66 78* 77*   MONOS PCT % 13* 12 10       Results from last 7 days  Lab Units 12/06/18 0620 12/05/18 1628 11/29/18 1918   POTASSIUM mmol/L 4 3 5 2 4 8   CHLORIDE mmol/L 108 104 104   CO2 mmol/L 24 26 22   BUN mg/dL 43* 45* 52*   CREATININE mg/dL 1 66* 1 69* 2 43*   CALCIUM mg/dL 9 2 9 0 8 8   ALK PHOS U/L  --   --  80   ALT U/L  --   --  17   AST U/L  --   --  21               Results from last 7 days  Lab Units 12/06/18 0620 12/05/18 1628 11/29/18 1918   INR  2 16* 2 83* 3 02*   PTT seconds  --  37 44*     No results found for: TROPONINT  ABG:No results found for: PHART, RTE5UVH, PO2ART, DEU6UOA, E5PZUPCF, BEART, SOURCE    Imaging Studies: I have personally reviewed pertinent reports  and I have personally reviewed pertinent films in PACS    EKG, Pathology, and Other Studies: I have personally reviewed pertinent reports     and I have personally reviewed pertinent films in PACS    VTE Prophylaxis: Warfarin (Coumadin) and aspirin    Code Status: Level 1 - Full Code  Advance Directive and Living Will:      Power of :    POLST:      Counseling / Coordination of Care  I spent 20 minutes with the patient

## 2018-12-06 NOTE — ASSESSMENT & PLAN NOTE
- Strict bedrest  - TLSO brace ordered  - Lumbar spine precautions ordered  - NSGY consult (family likely not open to surgery daughter is POA)  - PT/OT when cleared  - Pain control  - From [address] may require upgraded level of baseline care

## 2018-12-06 NOTE — H&P
H&P Exam - Trauma   Sarah Mazariegos 80 y o  male MRN: [id number]  Unit/Bed#: ED 13 Encounter: [id number]    Assessment/Plan   Trauma Alert: Evaluation- Transfer  Model of Arrival: Ambulance  Trauma Team: Attending Slava Liner and Residents Susan  Consultants: [facility]mt, PT/OT    Trauma Active Problems:   1) T10 compression fracture  2) Complex L1, L2 and L3 vertebral body fractures  3) Flank ecchymosis  4) Ambulatory Dysfunction    Trauma Plan:   1) T10 compression fracture  2) Complex L1, L2 and L3 vertebral body fractures  - Strict bedrest  - TLSO brace ordered  - Lumbar spine precautions ordered  - NSGY consult (family likely not open to surgery daughter is POA)  - PT/OT when cleared  - Pain control  - From [address] [place] [address] Road may require upgraded level of baseline care    3) Flank ecchymosis  - On coumadin, per CT scan no evidence of hematoma    4) Ambulatory Dysfunction   - PT/OT - per patient "I ambulate on my own" per nursing uses walker  - Has dementia    Chief Complaint: "I Don't know why they sent me here "    History of Present Illness   HPI:  Sarah Mazariegos is a 80 y o  male who presents as a transfer from [facility]  Patient is poor historian and has hx of dementia  Is on coumadin for apparent atrial fibrillation  He has had multiple falls recently reportedly but he denies this  He is from a nursing facility; his daughter who is POA confirms baseline mental status  He denies any CP, abd pain, SOB but states pain when he moves  He has healing ecchymosis along flank and perineum, only abrasion is on forearm  Apparently was not able to ambulate today that prompted bringing to the ER  He can flex both legs and states sensation both legs the same and intact  Denies issues with urination  Of note I did speak with daughter who is POA on the phone and she states given mental status and age would likely not want any surgical intervention        Review of Systems   Unable to perform ROS: Dementia (May be unreliable)   Constitutional: Positive for activity change  Negative for appetite change, chills and fever  HENT: Negative for congestion, rhinorrhea and sore throat  Eyes: Negative for photophobia and pain  Respiratory: Negative for cough, chest tightness, shortness of breath and wheezing  Cardiovascular: Negative for chest pain, palpitations and leg swelling  Gastrointestinal: Negative for abdominal distention, abdominal pain, constipation, diarrhea, nausea and vomiting  Genitourinary: Negative for dysuria, flank pain, frequency and hematuria  Musculoskeletal: Positive for arthralgias, back pain, gait problem and myalgias  Negative for neck pain and neck stiffness  Skin: Negative for color change, pallor, rash and wound  Has wounds but denies   Neurological: Negative for seizures, speech difficulty, weakness, light-headedness, numbness and headaches  Hematological: Negative for adenopathy  Psychiatric/Behavioral: Negative for agitation, confusion, hallucinations and suicidal ideas  Historical Information   History is unobtainable from the patient due to Dementia - obtained but may be unrelaible  Efforts to obtain history included the following sources: family member    Past Medical History:   Diagnosis Date    Anxiety     Dementia     DJD (degenerative joint disease)     Hyperlipidemia     Hypertension     Psychiatric disorder     anxiety depresion      History reviewed  No pertinent surgical history  Social History   History   Alcohol Use No     History   Drug Use No     History   Smoking Status    Never Smoker   Smokeless Tobacco    Never Used     There is no immunization history for the selected administration types on file for this patient    Last Tetanus:Unknown - updated today  Family History: Non-contributory  Unable to obtain/limited by dementia      Meds/Allergies   all current active meds have been reviewed    No Known Allergies      PHYSICAL EXAM    PE limited by: dementia/patient compliance    Objective   Vitals:   First set: Temperature: 98 7 °F (37 1 °C) (12/05/18 1930)  Pulse: 88 (12/05/18 1930)  Respirations: 18 (12/05/18 1930)  Blood Pressure: 156/67 (12/05/18 1930)    Primary Survey:   (A) Airway: Intact  (B) Breathing: B/L breath sounds  (C) Circulation: Pulses:   carotid  2/4, pedal  1/4, radial  2/4 and femoral  2/4  (D) Disabliity:  GCS Total:  14, Eye Opening:   Spontaneous = 4, Motor Response: Obeys commands = 6 and Verbal Response:  Confused = 4  (E) Expose:  Completed    Secondary Survey: (Click on Physical Exam tab above)  Physical Exam   Constitutional: No distress  Elderly   HENT:   Head: Normocephalic and atraumatic  Mouth/Throat: No oropharyngeal exudate  Eyes: Pupils are equal, round, and reactive to light  EOM are normal    Neck: Normal range of motion  No tracheal deviation present  No thyromegaly present  No TTP   Cardiovascular:   Murmur heard  Pulmonary/Chest: Effort normal  No respiratory distress  He has no wheezes  Abdominal: Soft  He exhibits no distension  There is tenderness  Mild Tenderness to deep palpation   Genitourinary:   Genitourinary Comments: Perineum with ecchymosis but no wounds, no warmth, no skin breakdown   Musculoskeletal: Normal range of motion  He exhibits no edema or tenderness  Patient has no pain with palpation of LE or UE  Has pain with lof roll but not with palpation of C-T-L spine    No pain with palpation of hips/pelvis   Neurological: He is alert  He exhibits normal muscle tone  Confused, but answers most questions correctly    Can flex both hips; can plantar and dorsiflex toes both legs; states sensation intact lower extremities with touch   Skin: Skin is warm  He is not diaphoretic  No erythema     Ecchymosis, groin, right flank and right thigh       Invasive Devices     Peripheral Intravenous Line            Peripheral IV 12/05/18 Right Antecubital less than 1 day                Lab Results: BMP/CMP:   Lab Results   Component Value Date    SODIUM 139 12/05/2018    K 5 2 12/05/2018     12/05/2018    CO2 26 12/05/2018    BUN 45 (H) 12/05/2018    CREATININE 1 69 (H) 12/05/2018    CALCIUM 9 0 12/05/2018    EGFR 35 12/05/2018   , CBC:   Lab Results   Component Value Date    WBC 8 50 12/05/2018    HGB 11 7 (L) 12/05/2018    HCT 36 9 12/05/2018    MCV 97 12/05/2018     12/05/2018    MCH 30 6 12/05/2018    MCHC 31 7 12/05/2018    RDW 14 3 12/05/2018    MPV 9 5 12/05/2018    NRBC 0 12/05/2018    and Coagulation:   Lab Results   Component Value Date    INR 2 83 (H) 12/05/2018     Imaging/EKG Studies: CT Scan C-Spine: negative for acute, CT Scan Abdomen/Pelvis: Lumbar and thoracic vertebral fractures  Other Studies: N/A    Code Status: Level 1 - Full Code  Advance Directive and Living Will:      Power of :    POLST:

## 2018-12-06 NOTE — PROGRESS NOTES
Progress Note - Tertiary Trauma Survey   Roberth Alonso 12/5/1927, 80 y o  male MRN: [id number]    Unit/Bed#: ED 13 Encounter: [id number]    Primary Care Provider: HAILE Ramirez   Date and time admitted to hospital: 12/5/2018  7:25 PM        Ambulatory dysfunction   Assessment & Plan    - PT/OT - per patient "I ambulate on my own" per nursing uses walker  - Has dementia     Traumatic ecchymosis of flank   Assessment & Plan    - On coumadin, per CT scan no evidence of hematoma  - INR 2 83 on admission     * Closed fracture of lumbar vertebral body ([address] )   Assessment & Plan    - Strict bedrest  - TLSO brace ordered  - Lumbar spine precautions ordered  - NSGY consult (family likely not open to surgery daughter is POA)  - PT/OT when cleared  - Pain control  - From [address] [place] [address] Road may require upgraded level of baseline care       Mechanism of Injury: Fall    Transfer from: [facility]  Outside Films Received: yes  Tertiary Exam Due on: Today    Subjective:  Patient states that he is feeling very well this morning  He reports 8/10 pain in his lower back which she says is unchanged from his admission yesterday  States the pain will sometimes move from his lower back down to his legs  Patient denies any new pain  Denies any headaches, changes in vision, nausea, dizziness  Denies any numbness or tingling in any extremities  Vitals: Blood pressure 124/68, pulse 78, temperature 98 7 °F (37 1 °C), resp  rate 18, height 5' 6" (1 676 m), weight 81 6 kg (179 lb 14 3 oz), SpO2 96 %  ,Body mass index is 29 04 kg/m²  CT / RADIOGRAPHS: ALL RESULTS MUST BE CONFIRMED BY FACULTY OR PRINTED REPORT    Ct Chest Abdomen Pelvis Wo Contrast    Result Date: 12/5/2018  Impression: Chest: No acute cardiopulmonary disease  Abdomen: Acute comminuted fracture at L1, L2 and L3 which appears to extend into the posterior elements concerning for unstable fracture   High-grade compression fracture at T10 with anterior wedging suggesting acute fracture  No acute inflammatory changes  Pelvis: Sigmoid colon diverticulosis without evidence of diverticulitis    Critical findings discussed with and acknowledged by Dr Paramjit Sterling at 5:31 PM on 12/5/2018  Signed by Angel Conner DO    Ct Cervical Spine Without Contrast    Result Date: 12/5/2018  Impression: Impression: Spondylotic changes and facet arthrosis  No findings of fracture or traumatic listhesis  Varying degrees of chronic mild central stenosis and and mild to moderately severe foraminal narrowing as above   Signed by Blair Benton MD      Consultants - List Service/ Faculty and Date: Neurosurgery, Gerontology    Active medications:           Current Facility-Administered Medications:     acetaminophen (TYLENOL) tablet 650 mg, 650 mg, Oral, Q6H, 650 mg at 12/05/18 2202    aspirin chewable tablet 324 mg, 324 mg, Oral, Daily    atorvastatin (LIPITOR) tablet 20 mg, 20 mg, Oral, Daily With Dinner    docusate sodium (COLACE) capsule 100 mg, 100 mg, Oral, BID, 100 mg at 12/05/18 2202    furosemide (LASIX) tablet 20 mg, 20 mg, Oral, Daily    lidocaine (LIDODERM) 5 % patch 1 patch, 1 patch, Topical, Daily, 1 patch at 12/05/18 2205    lisinopril (ZESTRIL) tablet 5 mg, 5 mg, Oral, Daily    methocarbamol (ROBAXIN) tablet 500 mg, 500 mg, Oral, Q6H PRN    morphine (PF) 4 mg/mL injection 4 mg, 4 mg, Intravenous, Q4H PRN, 4 mg at 12/05/18 2157    rivastigmine (EXELON) 4 6 mg/24 hr TD 24 hr patch 1 patch, 1 patch, Transdermal, Daily    sertraline (ZOLOFT) tablet 50 mg, 50 mg, Oral, Daily    warfarin (COUMADIN) tablet 4 mg, 4 mg, Oral, Daily (warfarin)    Current Outpatient Prescriptions:     acetaminophen (TYLENOL) 325 mg tablet    ASPIRIN PO    atorvastatin (LIPITOR) 20 mg tablet    DOCUSATE SODIUM PO    furosemide (LASIX) 20 mg tablet    Lidocaine (ASPERCREME LIDOCAINE) 4 % PTCH    lisinopril (ZESTRIL) 2 5 mg tablet    LORazepam (ATIVAN) 0 5 mg tablet    memantine (NAMENDA TITRATION PACK)    rivastigmine (EXELON) 4 6 mg/24 hr TD 24 hr patch    sertraline (ZOLOFT) 50 mg tablet    warfarin (COUMADIN) 2 5 mg tablet      Intake/Output Summary (Last 24 hours) at 12/06/18 0778  Last data filed at 12/06/18 0501   Gross per 24 hour   Intake                0 ml   Output              200 ml   Net             -200 ml       Invasive Devices     Peripheral Intravenous Line            Peripheral IV 12/05/18 Right Antecubital less than 1 day                CAGE-AID Questionnaire:    Was the patient able to participate in the CAGE-AID screening questions on admission? Yes    Is the patient 65 years or older: YES:    1  Before the illness or injury that brought you to the Emergency, did you need someone to help you on a regular basis? 1=Yes   2  Since the illness or injury that brought you to the Emergency, have you needed more help than usual to take care of yourself? 1=Yes   3  Have you been hospitalized for one or more nights during the past 6 months (excluding a stay in the Emergency Department)? 0=No   4  In general, do you see well? 0=Yes   5  In general, do you have serious problems with your memory? 1=Yes   6  Do you take more than three different medications everyday? 1=Yes   TOTAL   4     Did you order a geriatric consult if the score was 2 or greater?: yes    1  GCS:  GCS Total:  15, Eye Opening:   Spontaneous = 4, Motor Response: Obeys commands = 6 and Verbal Response:  Oriented = 5   Patient answers most questions correctly, but is sometimes confused  Oriented to person and time  Knows he is in the hospital, but unsure what city  2  Head:   WNL and Atraumatic and normocephalic  No noted ecchymosis, lacerations, or abrasions  PERRL  EOM intact  3  Neck:   WNL and No laceration, abrasion, hematoma, or swelling  No JVD  No tenderness to palpation  Patient displays full range of motion without any tenderness  4  Chest:   WNL and No chest wall tenderness  Regular rate and rhythm    Murmur auscultated  No rubs or gallops  Breath sounds clear to auscultation bilaterally  No respiratory distress  No increased respiratory effort  No wheezes rales or rhonchi  5  Abdomen/Pelvis:   a  Inspect for:    lac/abrasion:  None and b   Palpate for:   tenderness/guarding:  None  Soft, nontender, nondistended  6  Back (log roll with spinal immobilization unless cleared radiographically):   Tenderness with palpation over lower back vertebrae  7  Extremities:   Lacs, abrasions, swelling, ecchymosis:  Various areas of ecchymosis over both arms  Ecchymosis and right flank and right thigh appears unchanged from yesterday  Tenderness, pain with motor, instability:  Patient denies any pain with active and passive range of motion  Able to plantar and dorsiflex toes of both legs  8  Peripheral Nerves:  WNL and Gross sensation intact in all extremities  Do NOT use the following abbreviations: DTO, gr, Jonah, MS, MSO4, MgSO4, Nitro, QD, QID, QOD, u, , ?, ?g or trailing zeros   Always use a zero before a decimal     Labs:   CBC:   Lab Results   Component Value Date    WBC 6 94 12/06/2018    HGB 10 6 (L) 12/06/2018    HCT 35 1 (L) 12/06/2018     (H) 12/06/2018     12/06/2018    MCH 30 5 12/06/2018    MCHC 30 2 (L) 12/06/2018    RDW 13 1 12/06/2018    MPV 11 2 12/06/2018    NRBC 0 12/06/2018     CMP:   Lab Results   Component Value Date     12/06/2018    CO2 24 12/06/2018    BUN 43 (H) 12/06/2018    CREATININE 1 66 (H) 12/06/2018    CALCIUM 9 2 12/06/2018    EGFR 35 12/06/2018     Coagulation:   Lab Results   Component Value Date    INR 2 83 (H) 12/05/2018     Troponin:   Lab Results   Component Value Date    TROPONINI <0 03 12/05/2018     ABG: No results found for: PHART, GXK1TAD, PO2ART, HKL8ZCA, D8YPUGUG, BEART, SOURCE      Seng Crouch PA-C

## 2018-12-06 NOTE — OCCUPATIONAL THERAPY NOTE
Occupational Therapy Cancellation Note    Orders received and chart reviewed  OT attempted to see, however Pt currently off of the unit for X-ray  Will continue to follow to be seen for OT evaluation as appropriate/when medically cleared       Mazin Huynh MS, OTR/L

## 2018-12-07 LAB
ANION GAP SERPL CALCULATED.3IONS-SCNC: 7 MMOL/L (ref 4–13)
BASOPHILS # BLD AUTO: 0.04 THOUSANDS/ΜL (ref 0–0.1)
BASOPHILS NFR BLD AUTO: 1 % (ref 0–1)
BUN SERPL-MCNC: 44 MG/DL (ref 5–25)
CALCIUM SERPL-MCNC: 8.5 MG/DL (ref 8.3–10.1)
CHLORIDE SERPL-SCNC: 107 MMOL/L (ref 100–108)
CO2 SERPL-SCNC: 25 MMOL/L (ref 21–32)
CREAT SERPL-MCNC: 1.74 MG/DL (ref 0.6–1.3)
EOSINOPHIL # BLD AUTO: 0.16 THOUSAND/ΜL (ref 0–0.61)
EOSINOPHIL NFR BLD AUTO: 2 % (ref 0–6)
ERYTHROCYTE [DISTWIDTH] IN BLOOD BY AUTOMATED COUNT: 12.9 % (ref 11.6–15.1)
GFR SERPL CREATININE-BSD FRML MDRD: 34 ML/MIN/1.73SQ M
GLUCOSE SERPL-MCNC: 85 MG/DL (ref 65–140)
HCT VFR BLD AUTO: 35 % (ref 36.5–49.3)
HGB BLD-MCNC: 10.6 G/DL (ref 12–17)
IMM GRANULOCYTES # BLD AUTO: 0.04 THOUSAND/UL (ref 0–0.2)
IMM GRANULOCYTES NFR BLD AUTO: 1 % (ref 0–2)
INR PPP: 2.05 (ref 0.86–1.17)
LYMPHOCYTES # BLD AUTO: 1.06 THOUSANDS/ΜL (ref 0.6–4.47)
LYMPHOCYTES NFR BLD AUTO: 14 % (ref 14–44)
MCH RBC QN AUTO: 30.8 PG (ref 26.8–34.3)
MCHC RBC AUTO-ENTMCNC: 30.3 G/DL (ref 31.4–37.4)
MCV RBC AUTO: 102 FL (ref 82–98)
MONOCYTES # BLD AUTO: 1.02 THOUSAND/ΜL (ref 0.17–1.22)
MONOCYTES NFR BLD AUTO: 13 % (ref 4–12)
NEUTROPHILS # BLD AUTO: 5.33 THOUSANDS/ΜL (ref 1.85–7.62)
NEUTS SEG NFR BLD AUTO: 69 % (ref 43–75)
NRBC BLD AUTO-RTO: 0 /100 WBCS
PLATELET # BLD AUTO: 214 THOUSANDS/UL (ref 149–390)
PMV BLD AUTO: 11.7 FL (ref 8.9–12.7)
POTASSIUM SERPL-SCNC: 4.6 MMOL/L (ref 3.5–5.3)
PROTHROMBIN TIME: 23.2 SECONDS (ref 11.8–14.2)
RBC # BLD AUTO: 3.44 MILLION/UL (ref 3.88–5.62)
SODIUM SERPL-SCNC: 139 MMOL/L (ref 136–145)
WBC # BLD AUTO: 7.65 THOUSAND/UL (ref 4.31–10.16)

## 2018-12-07 PROCEDURE — G8987 SELF CARE CURRENT STATUS: HCPCS

## 2018-12-07 PROCEDURE — G8979 MOBILITY GOAL STATUS: HCPCS

## 2018-12-07 PROCEDURE — 85610 PROTHROMBIN TIME: CPT | Performed by: PHYSICIAN ASSISTANT

## 2018-12-07 PROCEDURE — G8978 MOBILITY CURRENT STATUS: HCPCS

## 2018-12-07 PROCEDURE — 85025 COMPLETE CBC W/AUTO DIFF WBC: CPT | Performed by: PHYSICIAN ASSISTANT

## 2018-12-07 PROCEDURE — 99232 SBSQ HOSP IP/OBS MODERATE 35: CPT | Performed by: FAMILY MEDICINE

## 2018-12-07 PROCEDURE — G8988 SELF CARE GOAL STATUS: HCPCS

## 2018-12-07 PROCEDURE — 97163 PT EVAL HIGH COMPLEX 45 MIN: CPT

## 2018-12-07 PROCEDURE — 99232 SBSQ HOSP IP/OBS MODERATE 35: CPT | Performed by: PHYSICIAN ASSISTANT

## 2018-12-07 PROCEDURE — 80048 BASIC METABOLIC PNL TOTAL CA: CPT | Performed by: PHYSICIAN ASSISTANT

## 2018-12-07 PROCEDURE — 97167 OT EVAL HIGH COMPLEX 60 MIN: CPT

## 2018-12-07 PROCEDURE — 99232 SBSQ HOSP IP/OBS MODERATE 35: CPT | Performed by: NEUROLOGICAL SURGERY

## 2018-12-07 RX ORDER — SENNOSIDES 8.6 MG
1 TABLET ORAL
Status: DISCONTINUED | OUTPATIENT
Start: 2018-12-07 | End: 2018-12-13 | Stop reason: HOSPADM

## 2018-12-07 RX ADMIN — ACETAMINOPHEN 975 MG: 325 TABLET, FILM COATED ORAL at 15:44

## 2018-12-07 RX ADMIN — WARFARIN SODIUM 4 MG: 2 TABLET ORAL at 17:17

## 2018-12-07 RX ADMIN — MEMANTINE 10 MG: 5 TABLET ORAL at 08:26

## 2018-12-07 RX ADMIN — ASPIRIN 81 MG 324 MG: 81 TABLET ORAL at 08:26

## 2018-12-07 RX ADMIN — ACETAMINOPHEN 975 MG: 325 TABLET, FILM COATED ORAL at 05:26

## 2018-12-07 RX ADMIN — DOCUSATE SODIUM 100 MG: 100 CAPSULE, LIQUID FILLED ORAL at 17:17

## 2018-12-07 RX ADMIN — ATORVASTATIN CALCIUM 20 MG: 20 TABLET, FILM COATED ORAL at 15:45

## 2018-12-07 RX ADMIN — SERTRALINE HYDROCHLORIDE 50 MG: 50 TABLET ORAL at 08:26

## 2018-12-07 RX ADMIN — FUROSEMIDE 20 MG: 20 TABLET ORAL at 08:26

## 2018-12-07 RX ADMIN — LIDOCAINE 1 PATCH: 50 PATCH CUTANEOUS at 08:27

## 2018-12-07 RX ADMIN — ACETAMINOPHEN 975 MG: 325 TABLET, FILM COATED ORAL at 22:35

## 2018-12-07 RX ADMIN — DOCUSATE SODIUM 100 MG: 100 CAPSULE, LIQUID FILLED ORAL at 08:26

## 2018-12-07 RX ADMIN — LORAZEPAM 0.25 MG: 0.5 TABLET ORAL at 00:00

## 2018-12-07 RX ADMIN — SENNOSIDES 8.6 MG: 8.6 TABLET, FILM COATED ORAL at 22:35

## 2018-12-07 RX ADMIN — LISINOPRIL 5 MG: 5 TABLET ORAL at 08:26

## 2018-12-07 RX ADMIN — RIVASTIGMINE 1 PATCH: 4.6 PATCH TRANSDERMAL at 08:28

## 2018-12-07 RX ADMIN — VITAMIN D, TAB 1000IU (100/BT) 1000 UNITS: 25 TAB at 08:26

## 2018-12-07 RX ADMIN — LORAZEPAM 0.25 MG: 0.5 TABLET ORAL at 08:26

## 2018-12-07 NOTE — PLAN OF CARE
Problem: PHYSICAL THERAPY ADULT  Goal: Performs mobility at highest level of function for planned discharge setting  See evaluation for individualized goals  Treatment/Interventions: Functional transfer training, LE strengthening/ROM, Elevations, Therapeutic exercise, Endurance training, Cognitive reorientation, Patient/family training, Equipment eval/education, Bed mobility, Gait training, Compensatory technique education, OT, Spoke to nursing  Equipment Recommended: Wheelchair, Sheridan Rodriges       See flowsheet documentation for full assessment, interventions and recommendations  Prognosis: Fair  Problem List: Decreased strength, Decreased range of motion, Decreased endurance, Impaired balance, Decreased mobility, Decreased coordination, Decreased cognition, Impaired judgement, Decreased safety awareness, Impaired vision, Impaired hearing, Orthopedic restrictions, Pain  Assessment: Pt is 80 y o  male seen for PT evaluation s/p admit to [facility] on 12/5/2018  Pt sent from [address] [place] [address] with intractable back pain status post fall several days ago with an indeterminate age T10 fracture  Repeat imaging revealing stable severe T10 fx ;Congenital block vertebra L1-L3  The previously identified fracture of the L2 vertebral body is not seen on plain radiographs  (from notes)  Pt fitted for TLSO w/ orders for TLSO on when >45* in bed and while upright  Current dx/ problem list includes: L 1-3 fxs; T10 fx; [place]; dementia; afib  PT now consulted for assessment of mobility and d/c needs  PMhx and  personal factors currently affecting pt's physical performance include: (see above for full); [place]; resides in SNF falls hx; cognitive decline; ;   Prior to admission, pt was residing at [facility] and was receiving A w/ mobility and ADL's- pt w/ poor cognition and unable to provide info  Upon evaluation, pt currently is requiring moDA  for functional transfers and modA for ambulation w/ RW and TLSO <3'     Pt presents functioning below baseline and currently w/ overall mobility deficits 2* to: decreased LE strength/AROM; limited flexibility;  generalized weakness/ deconditioning; decreased endurance; decreased activity tolerance; decreased coordination; impaired balance; gait deviations; decreased safety awareness; SOB/WAGNER; fatigue; impaired safety and judgement; limited insight into current deficits; bed/ chair alarms; multiple lines; hearing impairment/ visual impairments;   Spinal precautions; TLSO; incontinent   Pt currently at risk for falls  (Please find additional objective findings from PT assessment regarding body systems outlined above ) Pt will continue to benefit from skilled PT interventions to address stated impairments; to maximize functional potential; for ongoing pt/ family training; and DME needs  PT is currently recommending d/c to inpatient rehab setting when medically cleared for safety and to maximize functional potential prior to d/c home  [facility] Pt/ family agreeable to plan and goals as stated on evaluation  Recommendation: Short-term skilled PT     PT - OK to Discharge:  (to rehab )    See flowsheet documentation for full assessment

## 2018-12-07 NOTE — PHYSICAL THERAPY NOTE
Physical Therapy Evaluation     Patient Name: Yumiko Alcocer    Today's Date: 12/7/2018     Problem List  Patient Active Problem List   Diagnosis    Traumatic ecchymosis of flank    [facility]    Closed fracture of lumbar vertebral body ([facility] )    Compression fracture of body of thoracic vertebra ([facility] )    Ambulatory dysfunction    Fall    Dementia    Social isolation related to decreased hearing    [place] (hard of hearing)        Past Medical History  Past Medical History:   Diagnosis Date    Anxiety     Dementia     DJD (degenerative joint disease)     Hyperlipidemia     Hypertension     Psychiatric disorder     anxiety depresion         Past Surgical History  History reviewed  No pertinent surgical history  12/07/18 1200   Note Type   Note type Eval only   Pain Assessment   Pain Assessment FLACC   Pain Rating: FLACC (Rest) - Face 0   Pain Rating: FLACC (Rest) - Legs 0   Pain Rating: FLACC (Rest) - Activity 0   Pain Rating: FLACC (Rest) - Cry 0   Pain Rating: FLACC (Rest) - Consolability 0   Score: FLACC (Rest) 0   Pain Rating: FLACC (Activity) - Face 1   Pain Rating: FLACC (Activity) - Legs 1   Pain Rating: FLACC (Activity) - Activity 0   Pain Rating: FLACC (Activity) - Cry 1   Pain Rating: FLACC (Activity) - Consolability 1   Score: FLACC (Activity) 4   Home Living   Type of Home (PCF )   Home Layout One level   Home Equipment Walker; Wheelchair-manual   Prior Function   Level of [place] Needs assistance with ADLs and functional mobility   Lives With Facility staff   ADL Assistance Needs assistance   IADLs Needs assistance   Falls in the last 6 months 1 to 4   Vocational Retired   Comments pt is a poor historian and unable to provide info- will require clarification    Restrictions/Precautions   Weight Bearing Precautions Per Order No   Braces or Orthoses TLSO  (to be worn when>45* upright FORY in SUPINE)   Other Precautions Pain; Fall Risk;Multiple lines;Hard of hearing;Visual impairment; Bed Alarm; Chair Alarm;Cognitive;Spinal precautions   General   Family/Caregiver Present No   Cognition   Overall Cognitive Status Impaired   Orientation Level Oriented to person   Following Commands Follows one step commands inconsistently   RLE Assessment   RLE Assessment WFL  (3+/5 throughout- functional obs- pt unable to follow command)   LLE Assessment   LLE Assessment (0 command following for accurate MMT)   Coordination   Movements are Fluid and Coordinated 0   Coordination and Movement Description slow and antalgic    Bed Mobility   Additional Comments pt OOB to chair on presentation w/ OT- TLSO donned    Transfers   Sit to Stand 3  Moderate assistance  (fluctuates b/t modA/ maxA on repeated attempts )   Additional items Assist x 1; Increased time required;Verbal cues   Stand to Sit 3  Moderate assistance   Additional items Assist x 1; Increased time required;Verbal cues  (for controlled descent )   Ambulation/Elevation   Gait pattern Shuffling   Gait Assistance 3  Moderate assist   Additional items Assist x 1   Assistive Device Rolling walker  (TLSO)   Distance 3'  (steps forward / backward- NEEDS chair follow for inc distnac)   Balance   Static Sitting Fair +   Static Standing Poor   Ambulatory Poor   Endurance Deficit   Endurance Deficit Yes   Activity Tolerance   Activity Tolerance Patient limited by fatigue;Patient limited by pain  (confusion and decreaed cognition )   Medical Staff Made Aware s/w OT- [place]    Assessment   Prognosis Fair   Problem List Decreased strength;Decreased range of motion;Decreased endurance; Impaired balance;Decreased mobility; Decreased coordination;Decreased cognition; Impaired judgement;Decreased safety awareness; Impaired vision; Impaired hearing;Orthopedic restrictions;Pain   Assessment Pt is 80 y o  male seen for PT evaluation s/p admit to [facility] on 12/5/2018      Pt sent from [facility] with intractable back pain status post fall several days ago with an indeterminate age T10 fracture  Repeat imaging revealing stable severe T10 fx ;Congenital block vertebra L1-L3  The previously identified fracture of the L2 vertebral body is not seen on plain radiographs  (from notes)  Pt fitted for TLSO w/ orders for TLSO on when >45* in bed and while upright  Current dx/ problem list includes: L 1-3 fxs; T10 fx; [place]; dementia; afib  PT now consulted for assessment of mobility and d/c needs  PMhx and  personal factors currently affecting pt's physical performance include: (see above for full); [place]; resides in SNF falls hx; cognitive decline; ;   Prior to admission, pt was residing at [facility] and was receiving A w/ mobility and ADL's- pt w/ poor cognition and unable to provide info  Upon evaluation, pt currently is requiring moDA  for functional transfers and modA for ambulation w/ RW and TLSO <3'  Pt presents functioning below baseline and currently w/ overall mobility deficits 2* to: decreased LE strength/AROM; limited flexibility;  generalized weakness/ deconditioning; decreased endurance; decreased activity tolerance; decreased coordination; impaired balance; gait deviations; decreased safety awareness; SOB/WAGNER; fatigue; impaired safety and judgement; limited insight into current deficits; bed/ chair alarms; multiple lines; hearing impairment/ visual impairments;   Spinal precautions; TLSO; incontinent   Pt currently at risk for falls  (Please find additional objective findings from PT assessment regarding body systems outlined above ) Pt will continue to benefit from skilled PT interventions to address stated impairments; to maximize functional potential; for ongoing pt/ family training; and DME needs  PT is currently recommending d/c to inpatient rehab setting when medically cleared for safety and to maximize functional potential prior to d/c home  [facility] Pt/ family agreeable to plan and goals as stated on evaluation        Goals   Patient Goals none expressed    STG Expiration Date 12/17/18   Short Term Goal #1 In 10 days pt will complete: 1) Bed mobility skills with MINA2) Functional transfers with minAI 3) Ambulation with RW 25' x2 w/ [place]/ TLSO' without LOB and stable vitals  4) Improve balance grades to F+ w/ AD 6) Improve LE strength grades by 1   7) LE HEP w/ S 8) PT for ongoing pt and family education; DME needs and D/C planning  Treatment Day 0   Plan   Treatment/Interventions Functional transfer training;LE strengthening/ROM; Elevations; Therapeutic exercise; Endurance training;Cognitive reorientation;Patient/family training;Equipment eval/education; Bed mobility;Gait training; Compensatory technique education;OT;Spoke to nursing   PT Frequency (3-5x)   Recommendation   Recommendation Short-term skilled PT   Equipment Recommended Wheelchair;Walker   PT - OK to Discharge (to rehab )   Modified [place] Scale   Modified Beau Scale 4   Barthel Index   Feeding 5   Bathing 0   Grooming Score 0   Dressing Score 0   Bladder Score 0   Bowels Score 5   Toilet Use Score 5   Transfers (Bed/Chair) Score 5   Mobility (Level Surface) Score 0   Stairs Score 0   Barthel Index Score 20   Santana Medina, PT

## 2018-12-07 NOTE — SOCIAL WORK
CM met with pt son  ΛΑΚΑΤΑΜΕΙΑ and aware cm role at discharge   Prior to admission pt was as [facility] living   ΛΑΚΑΤΑΜΕΙΑ states that pt in able to go back to assisted living he is requiring more care than they can give  Pt was assists X1 ADLs   Pt was at [facility] in the past   Pt daughter Sabrina Styles is POA   Cm left voice message for [employer] and cm provided all contact information   ΛΑΚΑΤΑΜΕΙΑ requested cm put referrals to [employer] and Jessa in [facility] as such   Cm will follow   CM reviewed d/c planning process including the following: identifying help at home, patient preference for d/c planning needs, Discharge Lounge, [facility] Meds to Bed program, availability of treatment team to discuss questions or concerns patient and/or family may have regarding understanding medications and recognizing signs and symptoms once discharged  CM also encouraged patient to follow up with all recommended appointments after discharge  Patient advised of importance for patient and family to participate in managing patients medical well being

## 2018-12-07 NOTE — PROGRESS NOTES
Progress Note - Nicholas Ricketts 12/5/1927, 80 y o  male MRN: [id number]    Unit/Bed#: [facility] 821-01 Encounter: [id number]    Primary Care Provider: HAILE Ruby   Date and time admitted to hospital: 12/5/2018  7:25 PM        Dementia   Assessment & Plan    - Gerontology assistance appreciated     Ambulatory dysfunction   Assessment & Plan    - PT/OT   - Rehab placement     Compression fracture of body of thoracic vertebra (HCC)   Assessment & Plan    - TLSO brace   - PT/OT  - Pain control  - Rehab     Coagulopathy ([address] )   Assessment & Plan    - continue Coumadin for h/o Afib  - INR 2 05 today     Traumatic ecchymosis of flank   Assessment & Plan    - On coumadin, per CT scan no evidence of hematoma  - INR 2 05 today     * Closed fracture of lumbar vertebral body (HCC)   Assessment & Plan    - TLSO brace   - PT/OT  - Pain control  - Rehab         DISPOSITION/ PLAN:  PT/OT  TLSO  Dispo planning    Chief Complaint: Pain all over    Subjective: States he has pain all over but this is not new  No parasthesias       Objective:     Meds/Allergies     Current Facility-Administered Medications:     acetaminophen (TYLENOL) tablet 975 mg, 975 mg, Oral, Q8H [facility], Mia Guerrero PA-C, 975 mg at 12/07/18 8669    aspirin chewable tablet 324 mg, 324 mg, Oral, Daily, Orrie Harvey, DO, 324 mg at 12/07/18 4763    atorvastatin (LIPITOR) tablet 20 mg, 20 mg, Oral, Daily With Dinner, Orrie Harvey, DO, 20 mg at 12/06/18 1828    cholecalciferol (VITAMIN D3) tablet 1,000 Units, 1,000 Units, Oral, Daily, Nahed Rabenold, DO, 1,000 Units at 12/07/18 0826    docusate sodium (COLACE) capsule 100 mg, 100 mg, Oral, BID, Dev R Susan, DO, 100 mg at 12/07/18 2393    furosemide (LASIX) tablet 20 mg, 20 mg, Oral, Daily, Dev R Susan, DO, 20 mg at 12/07/18 0826    lidocaine (LIDODERM) 5 % patch 1 patch, 1 patch, Topical, Daily, Dev R Susan, DO, 1 patch at 12/07/18 0827    lisinopril (ZESTRIL) tablet 5 mg, 5 mg, Oral, Daily, Brenden Santizo DO, 5 mg at 12/07/18 2536    LORazepam (ATIVAN) tablet 0 25 mg, 0 25 mg, Oral, BID, Sarah Murillo, PA-C, 0 25 mg at 12/07/18 0826    memantine (NAMENDA) tablet 10 mg, 10 mg, Oral, Daily, Smith Apple, PA-C, 10 mg at 12/07/18 8229    methocarbamol (ROBAXIN) tablet 250 mg, 250 mg, Oral, Q6H PRN, Sarah Murillo, PA-C    oxyCODONE (ROXICODONE) IR tablet 2 5 mg, 2 5 mg, Oral, Q4H PRN, Smith Apple, PA-C    oxyCODONE (ROXICODONE) IR tablet 5 mg, 5 mg, Oral, Q4H PRN, Sarah Murillo, PA-C, 5 mg at 12/06/18 1829    rivastigmine (EXELON) 4 6 mg/24 hr TD 24 hr patch 1 patch, 1 patch, Transdermal, Daily, Brenden Santizo DO, 1 patch at 12/07/18 0828    sertraline (ZOLOFT) tablet 50 mg, 50 mg, Oral, Daily, Dev BERTHA Davis DO, 50 mg at 12/07/18 4264    warfarin (COUMADIN) tablet 4 mg, 4 mg, Oral, Daily (warfarin), Brenden Santizo DO, 4 mg at 12/06/18 1829    Vitals: Blood pressure (!) 174/77, pulse 76, temperature 97 8 °F (36 6 °C), temperature source Oral, resp  rate 16, height 5' 4" (1 626 m), weight 85 5 kg (188 lb 9 6 oz), SpO2 98 %  Body mass index is 32 37 kg/m²   SpO2: SpO2: 98 %    ABG: No results found for: PHART, MYR8ENE, PO2ART, UFF5WJR, A9JRMFNI, BEART, SOURCE      Intake/Output Summary (Last 24 hours) at 12/07/18 1000  Last data filed at 12/07/18 5391   Gross per 24 hour   Intake                0 ml   Output              466 ml   Net             -466 ml       Invasive Devices     Peripheral Intravenous Line            Peripheral IV 12/05/18 Right Antecubital 1 day                        Nutrition/GI Proph/Bowel Reg: Colace, add senokot    Pain management regimen: Tylenol, Lidoderm, Robaxin, Oxycodone    VTE Prophylaxis: Patient on Coumadin    Physical Exam:   Constitution: patient appears comfortable, no acute distress  HEENT: normocephalic, atraumatic, PERRLA, clear speech  CV: regular rate and rhythm, no edema, cap refill intact b/l LE's  Pulm: CTA, no wheezes, rhonchi or crackles, unlabored, equal bilaterally  Abd: soft, nontender, nondistended, active bowel sounds  Musc: moves all extremities, equal strength, no calf tenderness  Neuro: A&O, no focal deficits  Skin: no rash or breakdown, warm      Lab Results:   BMP/CMP:   Lab Results   Component Value Date    SODIUM 139 12/07/2018    K 4 6 12/07/2018     12/07/2018    CO2 25 12/07/2018    BUN 44 (H) 12/07/2018    CREATININE 1 74 (H) 12/07/2018    CALCIUM 8 5 12/07/2018    EGFR 34 12/07/2018    and CBC:   Lab Results   Component Value Date    WBC 7 65 12/07/2018    HGB 10 6 (L) 12/07/2018    HCT 35 0 (L) 12/07/2018     (H) 12/07/2018     12/07/2018    MCH 30 8 12/07/2018    MCHC 30 3 (L) 12/07/2018    RDW 12 9 12/07/2018    MPV 11 7 12/07/2018    NRBC 0 12/07/2018     Imaging/EKG Studies: Results: I have personally reviewed pertinent reports          Nurse provider rounds completed with [address]

## 2018-12-07 NOTE — PLAN OF CARE
Problem: OCCUPATIONAL THERAPY ADULT  Goal: Performs self-care activities at highest level of function for planned discharge setting  See evaluation for individualized goals  Treatment Interventions: ADL retraining, Functional transfer training, Endurance training, Cognitive reorientation, Patient/family training, Equipment evaluation/education, Compensatory technique education, Activityengagement          See flowsheet documentation for full assessment, interventions and recommendations  Limitation: Decreased ADL status, Decreased Safe judgement during ADL, Decreased cognition, Decreased endurance, Decreased self-care trans  Prognosis: Good  Assessment: Pt is a 80 y o  male [facility] with Closed fracture of lumbar vertebral body ([facility] [address] ) 2* fall    Pt's problem list also includes PMH of HTN, dementia and anxiety, depression, djd  At baseline pt was completing adls with assist from facility staff - ambulates with rw - meals/homemaking provided  Pt lives at [facility]  Currently pt requires max assist for overall ADLS and mod assist for functional mobility/transfers  Pt currently presents with impairments in the following categories -difficulty performing ADLS, difficulty performing IADLS , limited insight into deficits, flat affect, decreased initiation and engagement  and health management   These impairments, as well as pt's fatigue, pain, spinal precautions and fall risk limit pt's ability to safely engage in all baseline areas of occupation, includingbathing, dressing, toileting, functional mobility/transfers, social participation  and leisure activities  From OT standpoint, recommend inpt rehab upon D/C  OT will continue to follow to address the below stated goals        OT Discharge Recommendation: Short Term Rehab

## 2018-12-07 NOTE — ORTHOTIC NOTE
Orthotic Note            Date: 12/7/2018      Patient Name: Markie Hammond        20 mins     Reason for Consult:  Patient Active Problem List   Diagnosis    Traumatic ecchymosis of flank    Coagulopathy ([address] )    Closed fracture of lumbar vertebral body ([address] )    Compression fracture of body of thoracic vertebra ([address] )    Ambulatory dysfunction    Fall    Dementia    Social isolation related to decreased hearing    [place] (hard of hearing)   [address] followed up with pt to see proper fit on [employer]  Upon arrival, pt was a sitting in chair at bedside with TLSO fitting improperly  [employer] changed TLSO from a size Large to a XL for optimal fit  Pt tolerated fitting well and is without questions or concerns at this time  RN aware and will continue to follow up daily  Recommendations:  Please call [employer] ext [phone] in regards to any bracing instructions and/or adjustments       [address] [facility] Restorative Technician, BS

## 2018-12-07 NOTE — PLAN OF CARE
Problem: DISCHARGE PLANNING - CARE MANAGEMENT  Goal: Discharge to post-acute care or home with appropriate resources  INTERVENTIONS:  - Conduct assessment to determine patient/family and health care team treatment goals, and need for post-acute services based on payer coverage, community resources, and patient preferences, and barriers to discharge  - Address psychosocial, clinical, and financial barriers to discharge as identified in assessment in conjunction with the patient/family and health care team  - Arrange appropriate level of post-acute services according to patient's   needs and preference and payer coverage in collaboration with the physician and health care team  - Communicate with and update the patient/family, physician, and health care team regarding progress on the discharge plan  - Arrange appropriate transportation to post-acute venues  D/c to snf   Outcome: Progressing

## 2018-12-07 NOTE — RESTORATIVE TECHNICIAN NOTE
Restorative Specialist Mobility Note       Activity: Other (Comment) (Back to bed  Pt left sitting in chair at bedside with call bell, phone, and tray within reach )     Assistive Device: Front wheel walker (HHA x2)        Repositioned: Supine              Anti-Embolism Device On:  Bilateral, Sequential compression devices, below knee

## 2018-12-07 NOTE — PROGRESS NOTES
Progress Note - Syeda Guerrero 80 y o  male MRN: [id number]    Unit/Bed#: [facility] 821-01 Encounter: [id number]      Assessment/Plan:  1  Ambulatory dysfunction/fall  Fall precautions   Multifactorial, medications contributing  Continue  reduction of Ativan and Namenda as previously outlined  PT/OT  At baseline patient ambulates with a roller walker  Continue vitamin-D 3    2  Dementia  At baseline patient alert and oriented to self occasionally place  Continue with reduction of Namenda to 10 mg  Continue supportive care    3  Social isolation  Exacerbated by patient's difficulty with hearing  Family to follow up with Cardiology as outpatient    4  Deconditioning  Multifactorial, exacerbated by injury and hospitalization  Encourage mobilization  PT/OT  Encourage social interaction    5  Heart hearing  Patient has hearing aids at not been working the last 2 years  Follow-up with Cardiology as outpatient    6  Delirium precautions  Patient at risk for delirium secondary to age, cognitive decline, difficulty hearing, poly pharmacy, hospitalization,pain, constipation,immobility    Identify and treat underlying cause    Provide frequent redirection, reorientation, distraction techniques    Avoid deliriogenic medications such as tramadol, benzodiazepines, anticholinergics,  Benadryl  Treat pain, See geriatric pain protocol  Monitor for constipation and urinary retention  Encourage early and frequent moblization, OOB  Encourage Hydration/ Nutrition  Implement sleep hygiene, limit night time interuptions, group activities    7  T10 compression fracture, L1 through L3 complex vertebral body fracture  Neurosurgery consult  TLSO brace  Pain management per geriatric pain protocol              Subjective:   paitient is alert to name and place only, told previous provider, he had pain all over, the stated to next interviewer, why would I have pain  I have no pain  Per nursing no issues       Objective:     Vitals: Blood pressure (!) 174/77, pulse 76, temperature 97 8 °F (36 6 °C), temperature source Oral, resp  rate 16, height 5' 4" (1 626 m), weight 85 5 kg (188 lb 9 6 oz), SpO2 98 %  ,Body mass index is 32 37 kg/m²  Intake/Output Summary (Last 24 hours) at 12/07/18 1218  Last data filed at 12/07/18 1100   Gross per 24 hour   Intake                0 ml   Output              626 ml   Net             -626 ml       Physical Exam: BP (!) 174/77 (BP Location: Left arm)   Pulse 76   Temp 97 8 °F (36 6 °C) (Oral)   Resp 16   Ht 5' 4" (1 626 m)   Wt 85 5 kg (188 lb 9 6 oz)   SpO2 98%   BMI 32 37 kg/m²   General appearance: alert and oriented, in no acute distress  Lungs: clear to auscultation bilaterally  Heart: regular rate and rhythm, S1, S2 normal, no murmur, click, rub or gallop  Abdomen: soft, non-tender; bowel sounds normal; no masses,  no organomegaly     Invasive Devices     Peripheral Intravenous Line            Peripheral IV 12/05/18 Right Antecubital 1 day                Lab, Imaging and other studies: I have personally reviewed pertinent reports      VTE Pharmacologic Prophylaxis: Sequential compression device (Venodyne)   VTE Mechanical Prophylaxis: sequential compression device

## 2018-12-07 NOTE — OCCUPATIONAL THERAPY NOTE
[address] Evaluation     Patient Name: Vaughn Orellana  Today's Date: 12/7/2018  Problem List  Patient Active Problem List   Diagnosis    Traumatic ecchymosis of flank    Coagulopathy ([address] )    Closed fracture of lumbar vertebral body ([address] )    [address] fracture of body of [facility] vertebra [address] [address]    Ambulatory dysfunction    Fall    Dementia    Social isolation related to decreased hearing    [place] (hard of hearing)     Past Medical History  Past Medical History:   Diagnosis Date    Anxiety     Dementia     DJD (degenerative joint disease)     Hyperlipidemia     Hypertension     Psychiatric disorder     anxiety depresion      Past Surgical History  History reviewed  No pertinent surgical history  12/07/18 0910   Note Type   Note type Eval/Treat   Restrictions/Precautions   Weight Bearing Precautions Per Order No   Braces or Orthoses TLSO   Other Precautions Fall Risk;Pain;Cognitive; Chair Alarm; Bed Alarm;Spinal precautions   Pain Assessment   Pain Assessment FLACC   Pain Rating: FLACC (Rest) - Face 0   Pain Rating: FLACC (Rest) - Legs 0   Pain Rating: FLACC (Rest) - Activity 0   Pain Rating: FLACC (Rest) - Cry 0   Pain Rating: FLACC (Rest) - Consolability 0   Score: FLACC (Rest) 0   Pain Rating: FLACC (Activity) - Face 1   Pain Rating: FLACC (Activity) - Legs 0   Pain Rating: FLACC (Activity) - Activity 0   Pain Rating: FLACC (Activity) - Cry 1   Pain Rating: FLACC (Activity) - Consolability 1   Score: FLACC (Activity) 3   Home Living   Type of Home Assisted living   Home Layout One level   Prior Function   Level of [place] Needs assistance with IADLs; Needs assistance with ADLs and functional mobility   Lives With Facility staff   Receives Help From Personal care attendant   ADL Assistance Needs assistance   IADLs Needs assistance   Falls in the last 6 months 1 to 4   Vocational Retired   [employer] RW PER PT - MEALS/HOMEMAKING PROVIDED   Reciprocal Relationships SUPPORTIVE FAMILY   Service to Others RETIRED   Intrinsic Gratification SEDENTARY- UNABLE TO ID   Subjective   Subjective OFFERS NO C/O    ADL   Eating Assistance 7  Independent   Grooming Assistance 5  Supervision/Setup   UB Bathing Assistance 4  Minimal Assistance   LB Bathing Assistance 2  Maximal Assistance   UB Dressing Assistance 4  Minimal Assistance   LB Dressing Assistance 2  Maximal Assistance   Toileting Assistance  2  Maximal Assistance   Bed Mobility   Supine to Sit 3  Moderate assistance   Transfers   Sit to Stand 3  Moderate assistance   Stand to Sit 3  Moderate assistance   Stand pivot 3  Moderate assistance   Functional Mobility   Functional Mobility 3  Moderate assistance   Additional items Rolling walker   Balance   Static Sitting Fair   Dynamic Sitting Fair -   Static Standing Poor   Dynamic Standing Poor   Activity Tolerance   Activity Tolerance Patient limited by fatigue;Treatment limited secondary to medical complications (Comment)   RUE Assessment   RUE Assessment WFL   LUE Assessment   LUE Assessment WFL   Cognition   Overall Cognitive Status Impaired   Arousal/Participation Alert; Cooperative   Attention Attends with cues to redirect   Orientation Level Oriented to person   Memory Decreased long term memory;Decreased short term memory;Decreased recall of biographical information;Decreased recall of recent events;Decreased recall of precautions   Following Commands Follows one step commands with increased time or repetition   Assessment   Limitation Decreased ADL status; Decreased Safe judgement during ADL;Decreased cognition;Decreased endurance;Decreased self-care trans   Prognosis Good   Assessment Pt is a 80 y o  male who was admitted to [facility] on 12/5/2018 with Closed fracture of lumbar vertebral body ([facility] [address] ) 2* fall    Pt's problem list also includes PMH of HTN, dementia and anxiety, depression, djd   At baseline pt was completing adls with assist from facility staff - ambulates with rw - meals/homemaking provided  Pt lives at [facility]  Currently pt requires max assist for overall ADLS and mod assist for functional mobility/transfers  Pt currently presents with impairments in the following categories -difficulty performing ADLS, difficulty performing IADLS , limited insight into deficits, flat affect, decreased initiation and engagement  and health management  activity tolerance, endurance, standing balance/tolerance, sitting balance/tolerance, memory, insight, safety , judgement  and attention   These impairments, as well as pt's fatigue, pain, spinal precautions and risk for falls  limit pt's ability to safely engage in all baseline areas of occupation, includingbathing, dressing, toileting, functional mobility/transfers, social participation  and leisure activities  From OT standpoint, recommend inpt rehab upon D/C  OT will continue to follow to address the below stated goals  Goals   Patient Goals none stated   LTG Time Frame 10-14   Long Term Goal #1 refer to established goals below   Plan   Treatment Interventions ADL retraining;Functional transfer training; Endurance training;Cognitive reorientation;Patient/family training;Equipment evaluation/education; Compensatory technique education; Activityengagement   Goal Expiration Date 12/21/18   OT Frequency 3-5x/wk   Recommendation   OT Discharge Recommendation Short Term Rehab   Barthel Index   Feeding 5   Bathing 0   Grooming Score 0   Dressing Score 0   Bladder Score 0   Bowels Score 5   Toilet Use Score 5   Transfers (Bed/Chair) Score 5   Mobility (Level Surface) Score 0   Stairs Score 0   Barthel Index Score 20         OCCUPATIONAL THERAPY GOALS:    *SBA FEEDING/GROOMING AFTER SETUP   *MIN A ADLS AFTER SETUP WITH USE OF ADAPTIVE DEVICES PRN  *MIN A  TOILETING AND CLOTHING MANAGEMENT   *MIN A FUNCTIONAL MOB AND TRANSFERS TO ALL SURFACES WITH FAIR TO FAIR+ BALANCE/SAFETY FOR PARTICIPATION IN DYNAMIC ADLS   *DEMONSTRATE FAIR+ CARRYOVER WITH SAFE USE OF RW DURING FUNCTIONAL TASKS  *ASSESS DME NEEDS  *INCREASE ACTIVITY TOLERANCE TO 30-35 MIN FOR PARTICIPATION IN ADLS AND ENJOYABLE ACTIVITIES     *PT TO PARTICIPATE IN ONGOING FUNCTIONAL COGNITIVE ASSESSMENT WITH GOOD ATTENTION/PARTICIPATION TO ASSIST WITH SAFE D/C RECOMMENDATIONS     Adam Enciso, OT

## 2018-12-07 NOTE — PROGRESS NOTES
Progress Note - Neurosurgery   Markie Hammond 80 y o  male MRN: [id number]  Unit/Bed#: [facility] 821-01 Encounter: [id number]    Assessment:  1  Communited  fractures of L1-3 with extension to posterior elements  2  Acute severe compression fracture of T10 with anterior wedging  3  History of dementia  4  Ambulatory dysfunction  5  History of AFib on warfarin and aspirin-coagulopathy        Plan:   § Exam: GCS14, Alert and awake, but disoriented x3, EOMI, conjugate, SF, finger-to-nose test is normal and without drift bilaterally  Strength is 5/5 throughout  Sensations to LT is normal throughout  DTR 3+ upper extremities, 2+ in the lower extremities bilaterally  No clonus and Babinski is negative bilaterally  § Images: CT CAP done on 12/5/2018 demonstrates comminuted fracture at L1-L2 and L3 which appears extend into the posterior elements concerning for unstable fracture  High-grade compression fracture at T10 with anterior wedging suggesting acute fracture  § Upright thoracolumbar x-rays done on 12/07/2018 Demonstrate stable T10 fractures, and communicated L1-3  ? Pain control: per primary team  ? DVT ppx:   § SCDs  § Patient on aspirin and warfarin  ? Activity: As tolerated  ? PT/OT evaluation & treatment  ? Brace: Wear TLSO brace when upright and at >45 degree HOB  ? Medical Mx: Per primary team  ? Neurocheck:routine  ? Patient still complains of back pain  He is wearing TLSO brace  Doing PT  Upright baseline thoracolumbar spine x-rays demonstrates stable fractures  No neurosurgical intervention is anticipated at this juncture  Continue conservative treatment with brace,  Rehab/physical therapy and pain control  Follow up p r n  Will sign off  Call For concern or problem  Subjective/Objective   Chief Complaint: "  my  Agnes Stevens is hurting"/ progress note    Subjective:  Patient reports his back pain,  also complains of aching all over his body  Has hearing and cognitive issues    Denies any numbness, weakness or paresthesia and extremities  Appetite is good, denies any nausea, vomiting, or urinary complaints  Patient has gait instability,  Uses walker  Wearing TLSO brace  Objective:  Patient is sitting  in the recliner wearing TLSO brace, in no pain distress    I/O       12/05 0701 - 12/06 0700 12/06 0701 - 12/07 0700 12/07 0701 - 12/08 0700    Urine (mL/kg/hr) 200 466 (0 2)     Total Output 200 466      Net -200 -466                   Invasive Devices     Peripheral Intravenous Line            Peripheral IV 12/05/18 Right Antecubital 1 day                Physical Exam:  Vitals: Blood pressure (!) 174/77, pulse 76, temperature 97 8 °F (36 6 °C), temperature source Oral, resp  rate 16, height 5' 4" (1 626 m), weight 85 5 kg (188 lb 9 6 oz), SpO2 98 %  ,Body mass index is 32 37 kg/m²  General appearance: alert, appears stated age, cooperative and no distress  Head: Normocephalic, without obvious abnormality, atraumatic  Eyes: EOMI,  conjugate  Neck: supple, symmetrical, trachea midline and NT  Back:  Tenderness in the T10 and L1-3 regions  Lungs: non labored breathing  Heart: regular heart rate  Neurologic:   Mental status: Alert,  Disoriented x3, thought content appropriate  Cranial nerves: grossly intact (Cranial nerves II-XII)  Sensory: normal to touch throughout  Motor: moving all extremities without focal weakness,  Strength is 5/5 throughout  Reflexes: 2+ and symmetric    No clonus  Symmetrical   Coordination: finger to nose normal bilaterally, no drift bilaterally      Lab Results:    Results from last 7 days  Lab Units 12/07/18  0510 12/06/18  0620 12/05/18  1628   WBC Thousand/uL 7 65 6 94 8 50   HEMOGLOBIN g/dL 10 6* 10 6* 11 7*   HEMATOCRIT % 35 0* 35 1* 36 9   PLATELETS Thousands/uL 214 200 224   NEUTROS PCT % 69 66 78*   MONOS PCT % 13* 13* 12       Results from last 7 days  Lab Units 12/07/18  0510 12/06/18  0620 12/05/18  1628   POTASSIUM mmol/L 4 6 4 3 5 2   CHLORIDE mmol/L 107 108 104   CO2 mmol/L 25 24 26   BUN mg/dL 44* 43* 45*   CREATININE mg/dL 1 74* 1 66* 1 69*   CALCIUM mg/dL 8 5 9 2 9 0               Results from last 7 days  Lab Units 12/07/18  0510 12/06/18  0620 12/05/18  1628   INR  2 05* 2 16* 2 83*   PTT seconds  --   --  37     No results found for: TROPONINT  ABG:No results found for: PHART, YOD9XST, PO2ART, FKC0FOC, A8EIZQEC, BEART, SOURCE    Imaging Studies: I have personally reviewed pertinent reports  and I have personally reviewed pertinent films in PACS    EKG, Pathology, and Other Studies: I have personally reviewed pertinent reports        VTE Pharmacologic Prophylaxis: Warfarin (Coumadin) and aspirin    VTE Mechanical Prophylaxis: sequential compression device

## 2018-12-08 LAB
ANION GAP SERPL CALCULATED.3IONS-SCNC: 8 MMOL/L (ref 4–13)
BUN SERPL-MCNC: 45 MG/DL (ref 5–25)
CALCIUM SERPL-MCNC: 9.2 MG/DL (ref 8.3–10.1)
CHLORIDE SERPL-SCNC: 103 MMOL/L (ref 100–108)
CO2 SERPL-SCNC: 25 MMOL/L (ref 21–32)
CREAT SERPL-MCNC: 1.59 MG/DL (ref 0.6–1.3)
GFR SERPL CREATININE-BSD FRML MDRD: 37 ML/MIN/1.73SQ M
GLUCOSE SERPL-MCNC: 85 MG/DL (ref 65–140)
INR PPP: 2.11 (ref 0.86–1.17)
MRSA NOSE QL CULT: NORMAL
POTASSIUM SERPL-SCNC: 4.3 MMOL/L (ref 3.5–5.3)
PROTHROMBIN TIME: 23.7 SECONDS (ref 11.8–14.2)
SODIUM SERPL-SCNC: 136 MMOL/L (ref 136–145)

## 2018-12-08 PROCEDURE — 85610 PROTHROMBIN TIME: CPT | Performed by: PHYSICIAN ASSISTANT

## 2018-12-08 PROCEDURE — 80048 BASIC METABOLIC PNL TOTAL CA: CPT | Performed by: PHYSICIAN ASSISTANT

## 2018-12-08 RX ADMIN — VITAMIN D, TAB 1000IU (100/BT) 1000 UNITS: 25 TAB at 08:28

## 2018-12-08 RX ADMIN — LISINOPRIL 5 MG: 5 TABLET ORAL at 08:28

## 2018-12-08 RX ADMIN — RIVASTIGMINE 1 PATCH: 4.6 PATCH TRANSDERMAL at 08:29

## 2018-12-08 RX ADMIN — ASPIRIN 81 MG 324 MG: 81 TABLET ORAL at 08:28

## 2018-12-08 RX ADMIN — LORAZEPAM 0.25 MG: 0.5 TABLET ORAL at 08:28

## 2018-12-08 RX ADMIN — WARFARIN SODIUM 4 MG: 2 TABLET ORAL at 17:20

## 2018-12-08 RX ADMIN — ACETAMINOPHEN 975 MG: 325 TABLET, FILM COATED ORAL at 13:22

## 2018-12-08 RX ADMIN — ATORVASTATIN CALCIUM 20 MG: 20 TABLET, FILM COATED ORAL at 17:20

## 2018-12-08 RX ADMIN — LORAZEPAM 0.25 MG: 0.5 TABLET ORAL at 17:20

## 2018-12-08 RX ADMIN — DOCUSATE SODIUM 100 MG: 100 CAPSULE, LIQUID FILLED ORAL at 17:20

## 2018-12-08 RX ADMIN — ACETAMINOPHEN 975 MG: 325 TABLET, FILM COATED ORAL at 21:12

## 2018-12-08 RX ADMIN — DOCUSATE SODIUM 100 MG: 100 CAPSULE, LIQUID FILLED ORAL at 08:28

## 2018-12-08 RX ADMIN — SERTRALINE HYDROCHLORIDE 50 MG: 50 TABLET ORAL at 08:28

## 2018-12-08 RX ADMIN — ACETAMINOPHEN 975 MG: 325 TABLET, FILM COATED ORAL at 05:20

## 2018-12-08 RX ADMIN — LIDOCAINE 1 PATCH: 50 PATCH CUTANEOUS at 08:29

## 2018-12-08 RX ADMIN — MEMANTINE 10 MG: 5 TABLET ORAL at 08:28

## 2018-12-08 RX ADMIN — SENNOSIDES 8.6 MG: 8.6 TABLET, FILM COATED ORAL at 21:13

## 2018-12-08 RX ADMIN — FUROSEMIDE 20 MG: 20 TABLET ORAL at 08:28

## 2018-12-08 NOTE — PROGRESS NOTES
Progress Note - Caryle Blade 12/5/1927, 80 y o  male MRN: [id number]    [facility] Encounter: [id number]    Primary Care Provider: HAILE Vaughn   Date and time admitted to hospital: 12/5/2018  7:25 PM    Dementia   Assessment & Plan    - Appreciate geriatric medicine recs     Ambulatory dysfunction   Assessment & Plan    - PT/OT   - Rehab placement     Compression fracture of body of thoracic vertebra (HCC)   Assessment & Plan    - TLSO brace   - Upright films stable (12/6)  - PT/OT  - Pain control  - Rehab     Coagulopathy ([address] )   Assessment & Plan    - continue Coumadin for h/o Afib  - INR 2 11 today from 2 05     Traumatic ecchymosis of flank   Assessment & Plan    - On coumadin, per CT scan no evidence of hematoma  - INR 2 11 today     * Closed fracture of lumbar vertebral body (HCC)   Assessment & Plan    - TLSO brace   - Upright films stable (12/6)  - PT/OT  - Pain control  - Rehab         Nurse provider rounds completed with Lina Hahn    DISPOSITION/ PLAN:  PT/OT  TLSO  Dispo planning    Chief Complaint:  None    Subjective:  No acute events overnight  Patient doing well this morning  No new complaints      Objective:     Meds/Allergies     Current Facility-Administered Medications:     acetaminophen (TYLENOL) tablet 975 mg, 975 mg, Oral, Q8H [address], Mia Guerrero PA-C, 975 mg at 12/08/18 0520    aspirin chewable tablet 324 mg, 324 mg, Oral, Daily, Wilcox Sukhdev, DO, 324 mg at 12/08/18 0891    atorvastatin (LIPITOR) tablet 20 mg, 20 mg, Oral, Daily With Dinner, Wilcox Sukhdev, DO, 20 mg at 12/07/18 1545    cholecalciferol (VITAMIN D3) tablet 1,000 Units, 1,000 Units, Oral, Daily, Nahed Rabenold, DO, 1,000 Units at 12/08/18 0828    docusate sodium (COLACE) capsule 100 mg, 100 mg, Oral, BID, Dev R Susan, DO, 100 mg at 12/08/18 0653    furosemide (LASIX) tablet 20 mg, 20 mg, Oral, Daily, Dev R Susan, DO, 20 mg at 12/08/18 0828    lidocaine (LIDODERM) 5 % patch 1 patch, 1 patch, Topical, Daily, Catie Ospina, DO, 1 patch at 12/08/18 0829    lisinopril (ZESTRIL) tablet 5 mg, 5 mg, Oral, Daily, Dev R Susan, DO, 5 mg at 12/08/18 2719    LORazepam (ATIVAN) tablet 0 25 mg, 0 25 mg, Oral, BID, Charisse Fitz, PA-C, 0 25 mg at 12/08/18 0828    memantine (NAMENDA) tablet 10 mg, 10 mg, Oral, Daily, Charisse Fitz, PA-C, 10 mg at 12/08/18 1570    oxyCODONE (ROXICODONE) IR tablet 2 5 mg, 2 5 mg, Oral, Q4H PRN, Charisse Fitz, PA-C    oxyCODONE (ROXICODONE) IR tablet 5 mg, 5 mg, Oral, Q4H PRN, Charisse Byrnes, PA-C, 5 mg at 12/06/18 1829    rivastigmine (EXELON) 4 6 mg/24 hr TD 24 hr patch 1 patch, 1 patch, Transdermal, Daily, Alexander Alvarado Susan, DO, 1 patch at 12/08/18 0829    senna (SENOKOT) tablet 8 6 mg, 1 tablet, Oral, HS, COURTNEY Zhang-C, 8 6 mg at 12/07/18 2235    sertraline (ZOLOFT) tablet 50 mg, 50 mg, Oral, Daily, Dev R Susan, DO, 50 mg at 12/08/18 8373    warfarin (COUMADIN) tablet 4 mg, 4 mg, Oral, Daily (warfarin), Alexander Rivase Susan, DO, 4 mg at 12/07/18 1717    Vitals: Blood pressure 167/79, pulse 69, temperature (!) 97 4 °F (36 3 °C), temperature source Oral, resp  rate 19, height 5' 4" (1 626 m), weight 85 5 kg (188 lb 9 6 oz), SpO2 97 %  Body mass index is 32 37 kg/m²  SpO2: SpO2: 97 %    ABG: No results found for: PHART, KVB5MGW, PO2ART, AZQ6WZP, C2EEVMBL, BEART, SOURCE      Intake/Output Summary (Last 24 hours) at 12/08/18 1003  Last data filed at 12/08/18 0300   Gross per 24 hour   Intake              300 ml   Output              569 ml   Net             -269 ml       Invasive Devices          No matching active lines, drains, or airways                  Nutrition/GI Proph/Bowel Reg: Colace, Senna    Pain management regimen: Tylenol, Oxycodone    VTE Prophylaxis: Patient on Coumadin    Physical Exam:   GEN: NAD  HEENT: MMM  CV: RRR  Lung: normal effort  Ab: Soft, NT/ND  Extrem: No CCE  Neuro:  A+Ox3, motor and sensation grossly intact      Lab Results:   BMP/CMP:   Lab Results   Component Value Date    SODIUM 136 12/08/2018    K 4 3 12/08/2018     12/08/2018    CO2 25 12/08/2018    BUN 45 (H) 12/08/2018    CREATININE 1 59 (H) 12/08/2018    CALCIUM 9 2 12/08/2018    EGFR 37 12/08/2018    and CBC:   No results found for: WBC, HGB, HCT, MCV, PLT, ADJUSTEDWBC, MCH, MCHC, RDW, MPV, NRBC  Imaging/EKG Studies:   XR spine thoracolumbar 2 vw   Final Result by Debbie Mccollum DO (12/07 0654)   1  Stable severe compression fracture T10    2   Congenital block vertebra L1-L3  The previously identified fracture of the L2 vertebral body is not seen on plain radiographs  Workstation performed: [id number]         CT head wo contrast   Final Result by Zohreh Gonzalez MD (12/06 0141)      No acute intracranial process or significant interval change  No skull fracture  Chronic microangiopathy                          Workstation performed: [id number]

## 2018-12-09 LAB
ANION GAP SERPL CALCULATED.3IONS-SCNC: 9 MMOL/L (ref 4–13)
BUN SERPL-MCNC: 49 MG/DL (ref 5–25)
CALCIUM SERPL-MCNC: 8.5 MG/DL (ref 8.3–10.1)
CHLORIDE SERPL-SCNC: 106 MMOL/L (ref 100–108)
CO2 SERPL-SCNC: 24 MMOL/L (ref 21–32)
CREAT SERPL-MCNC: 1.7 MG/DL (ref 0.6–1.3)
GFR SERPL CREATININE-BSD FRML MDRD: 34 ML/MIN/1.73SQ M
GLUCOSE SERPL-MCNC: 85 MG/DL (ref 65–140)
INR PPP: 1.96 (ref 0.86–1.17)
POTASSIUM SERPL-SCNC: 4.2 MMOL/L (ref 3.5–5.3)
PROTHROMBIN TIME: 22.4 SECONDS (ref 11.8–14.2)
SODIUM SERPL-SCNC: 139 MMOL/L (ref 136–145)

## 2018-12-09 PROCEDURE — 99232 SBSQ HOSP IP/OBS MODERATE 35: CPT | Performed by: SURGERY

## 2018-12-09 PROCEDURE — 85610 PROTHROMBIN TIME: CPT | Performed by: SURGERY

## 2018-12-09 PROCEDURE — 80048 BASIC METABOLIC PNL TOTAL CA: CPT | Performed by: SURGERY

## 2018-12-09 RX ORDER — WARFARIN SODIUM 1 MG/1
1 TABLET ORAL
Status: COMPLETED | OUTPATIENT
Start: 2018-12-09 | End: 2018-12-09

## 2018-12-09 RX ADMIN — WARFARIN SODIUM 4 MG: 2 TABLET ORAL at 17:11

## 2018-12-09 RX ADMIN — ATORVASTATIN CALCIUM 20 MG: 20 TABLET, FILM COATED ORAL at 17:11

## 2018-12-09 RX ADMIN — ACETAMINOPHEN 975 MG: 325 TABLET, FILM COATED ORAL at 21:46

## 2018-12-09 RX ADMIN — DOCUSATE SODIUM 100 MG: 100 CAPSULE, LIQUID FILLED ORAL at 17:11

## 2018-12-09 RX ADMIN — SENNOSIDES 8.6 MG: 8.6 TABLET, FILM COATED ORAL at 21:46

## 2018-12-09 RX ADMIN — MEMANTINE 10 MG: 5 TABLET ORAL at 08:43

## 2018-12-09 RX ADMIN — WARFARIN SODIUM 1 MG: 1 TABLET ORAL at 17:11

## 2018-12-09 RX ADMIN — DOCUSATE SODIUM 100 MG: 100 CAPSULE, LIQUID FILLED ORAL at 08:44

## 2018-12-09 RX ADMIN — LORAZEPAM 0.25 MG: 0.5 TABLET ORAL at 08:44

## 2018-12-09 RX ADMIN — LISINOPRIL 5 MG: 5 TABLET ORAL at 08:43

## 2018-12-09 RX ADMIN — FUROSEMIDE 20 MG: 20 TABLET ORAL at 08:44

## 2018-12-09 RX ADMIN — ACETAMINOPHEN 975 MG: 325 TABLET, FILM COATED ORAL at 06:01

## 2018-12-09 RX ADMIN — LORAZEPAM 0.25 MG: 0.5 TABLET ORAL at 17:11

## 2018-12-09 RX ADMIN — ASPIRIN 81 MG 324 MG: 81 TABLET ORAL at 08:44

## 2018-12-09 RX ADMIN — VITAMIN D, TAB 1000IU (100/BT) 1000 UNITS: 25 TAB at 08:44

## 2018-12-09 RX ADMIN — LIDOCAINE 1 PATCH: 50 PATCH CUTANEOUS at 08:44

## 2018-12-09 RX ADMIN — SERTRALINE HYDROCHLORIDE 50 MG: 50 TABLET ORAL at 08:44

## 2018-12-09 RX ADMIN — RIVASTIGMINE 1 PATCH: 4.6 PATCH TRANSDERMAL at 08:45

## 2018-12-09 NOTE — OCCUPATIONAL THERAPY NOTE
Occupational Therapy         Patient Name: Wing Santiago  Today's Date: 12/9/2018    Chart reviewed, RN cleared pt for therapy  Pt received bedside, attempted to wake pt with lt touch to shoulder, verbalizing pt's name, pt continued to sleep/maintain eyes closed, sternal rub performed pt attempted to swing/hit therapy and state "why are you doing this"  Explained role of therapy, pt continued to close eyes and refuse  Will continue attempts for follow up treatment    Fatou Camacho, OT

## 2018-12-09 NOTE — PROGRESS NOTES
Progress Note - Asim Andrade 12/5/1927, 80 y o  male MRN: [id number]    Unit/Bed#: [facility] 821-01 Encounter: [id number]    Primary Care Provider: HAILE Raygoza   Date and time admitted to hospital: 12/5/2018  7:25 PM        Dementia   Assessment & Plan    - Appreciate geriatric medicine recs     Ambulatory dysfunction   Assessment & Plan    - PT/OT   - Rehab placement     Compression fracture of body of thoracic vertebra (HCC)   Assessment & Plan    - TLSO brace   - Upright films stable (12/6)  - PT/OT  - Pain control  - Rehab     Coagulopathy ([address] )   Assessment & Plan    - continue Coumadin for h/o Afib       Traumatic ecchymosis of flank   Assessment & Plan    - On coumadin, per CT scan no evidence of hematoma       * Closed fracture of lumbar vertebral body (HCC)   Assessment & Plan    - TLSO brace   - Upright films stable (12/6)  - PT/OT  - Pain control  - Rehab         DISPOSITION/ PLAN:  Rehab placement    Chief Complaint: None    Subjective: Resting comfortably    Objective:     Meds/Allergies     Current Facility-Administered Medications:     acetaminophen (TYLENOL) tablet 975 mg, 975 mg, Oral, Q8H [facility], iMa Guerrero PA-C, 975 mg at 12/09/18 0601    aspirin chewable tablet 324 mg, 324 mg, Oral, Daily, Dev R Susan, DO, 324 mg at 12/09/18 0844    atorvastatin (LIPITOR) tablet 20 mg, 20 mg, Oral, Daily With Dinner, Dev R Susan, DO, 20 mg at 12/08/18 1720    cholecalciferol (VITAMIN D3) tablet 1,000 Units, 1,000 Units, Oral, Daily, Nahed Rabenold, DO, 1,000 Units at 12/09/18 0844    docusate sodium (COLACE) capsule 100 mg, 100 mg, Oral, BID, Dev R Suasn, DO, 100 mg at 12/09/18 0844    furosemide (LASIX) tablet 20 mg, 20 mg, Oral, Daily, Dev R Susan, DO, 20 mg at 12/09/18 0844    lidocaine (LIDODERM) 5 % patch 1 patch, 1 patch, Topical, Daily, Dev R Susan, DO, 1 patch at 12/09/18 0844    lisinopril (ZESTRIL) tablet 5 mg, 5 mg, Oral, Daily, Dev Davis DO, 5 mg at 12/09/18 0843    LORazepam (ATIVAN) tablet 0 25 mg, 0 25 mg, Oral, BID, Cyntha Zne, PA-C, 0 25 mg at 12/09/18 0844    memantine (NAMENDA) tablet 10 mg, 10 mg, Oral, Daily, Cyntha Zen, PA-C, 10 mg at 12/09/18 0843    oxyCODONE (ROXICODONE) IR tablet 2 5 mg, 2 5 mg, Oral, Q4H PRN, Cyntha Dura, PA-C    oxyCODONE (ROXICODONE) IR tablet 5 mg, 5 mg, Oral, Q4H PRN, Cyntha Zen, PA-C, 5 mg at 12/06/18 1829    rivastigmine (EXELON) 4 6 mg/24 hr TD 24 hr patch 1 patch, 1 patch, Transdermal, Daily, Dev R Susan, DO, 1 patch at 12/09/18 0845    senna (SENOKOT) tablet 8 6 mg, 1 tablet, Oral, HS, Marian Mahoney PA-C, 8 6 mg at 12/08/18 2113    sertraline (ZOLOFT) tablet 50 mg, 50 mg, Oral, Daily, Dev R Susan, DO, 50 mg at 12/09/18 0844    warfarin (COUMADIN) tablet 4 mg, 4 mg, Oral, Daily (warfarin), Bill Caren Susan, DO, 4 mg at 12/08/18 1720    Vitals: Blood pressure 161/70, pulse 71, temperature (!) 97 °F (36 1 °C), temperature source Axillary, resp  rate 20, height 5' 4" (1 626 m), weight 85 5 kg (188 lb 9 6 oz), SpO2 98 %  Body mass index is 32 37 kg/m²   SpO2: SpO2: 98 %    ABG: No results found for: PHART, JXO6DEX, PO2ART, WJO9UTJ, I2JMACIJ, BEART, SOURCE      Intake/Output Summary (Last 24 hours) at 12/09/18 2034  Last data filed at 12/08/18 2101   Gross per 24 hour   Intake              538 ml   Output              191 ml   Net              347 ml       Invasive Devices          No matching active lines, drains, or airways                  Nutrition/GI Proph/Bowel Reg: Colace, Senokot    Pain management regimen: Tylenol, Lidoderm, Oxycodone    VTE Prophylaxis: Coumadin    Physical Exam:   Constitution: patient appears comfortable, no acute distress  HEENT: normocephalic, atraumatic, PERRLA, clear speech  CV: regular rate and rhythm, no edema, cap refill intact b/l LE's  Pulm: CTA, no wheezes, rhonchi or crackles, unlabored, equal bilaterally  Abd: soft, nontender, nondistended, active bowel sounds  Musc: moves all extremities, equal strength, no calf tenderness  Neuro: A&O, no focal deficits  Skin: no rash or breakdown, warm      Lab Results:   BMP/CMP:   Lab Results   Component Value Date    SODIUM 139 12/09/2018    K 4 2 12/09/2018     12/09/2018    CO2 24 12/09/2018    BUN 49 (H) 12/09/2018    CREATININE 1 70 (H) 12/09/2018    CALCIUM 8 5 12/09/2018    EGFR 34 12/09/2018   , CBC: No results found for: WBC, HGB, HCT, MCV, PLT, ADJUSTEDWBC, MCH, MCHC, RDW, MPV, NRBC and Coagulation:   Lab Results   Component Value Date    INR 1 96 (H) 12/09/2018     Imaging/EKG Studies: Results: I have personally reviewed pertinent reports          Nurse provider rounds completed with Britt Rodarte

## 2018-12-09 NOTE — PHYSICAL THERAPY NOTE
PT Refusal    Pt chart reviewed  On arrival, pt was asleep in bed, and responsive to only sternal rub  With verbal cues, pt open eyes and swung arms  When asked if he was willing to participate in PT for donning of the TLSO and OOB, he reported, "why are you doing this to me?!" and attempted to hit a second time  Pt is not currently agreeable to PT treatment session  Will follow and attempt to see at another time      Julissa Bean, PT, DPT

## 2018-12-10 LAB
ANION GAP SERPL CALCULATED.3IONS-SCNC: 9 MMOL/L (ref 4–13)
BUN SERPL-MCNC: 49 MG/DL (ref 5–25)
CALCIUM SERPL-MCNC: 8.8 MG/DL (ref 8.3–10.1)
CHLORIDE SERPL-SCNC: 107 MMOL/L (ref 100–108)
CO2 SERPL-SCNC: 24 MMOL/L (ref 21–32)
CREAT SERPL-MCNC: 1.73 MG/DL (ref 0.6–1.3)
GFR SERPL CREATININE-BSD FRML MDRD: 34 ML/MIN/1.73SQ M
GLUCOSE SERPL-MCNC: 75 MG/DL (ref 65–140)
INR PPP: 2.22 (ref 0.86–1.17)
POTASSIUM SERPL-SCNC: 4.5 MMOL/L (ref 3.5–5.3)
PROTHROMBIN TIME: 24.7 SECONDS (ref 11.8–14.2)
SODIUM SERPL-SCNC: 140 MMOL/L (ref 136–145)

## 2018-12-10 PROCEDURE — 85610 PROTHROMBIN TIME: CPT | Performed by: PHYSICIAN ASSISTANT

## 2018-12-10 PROCEDURE — 80048 BASIC METABOLIC PNL TOTAL CA: CPT | Performed by: PHYSICIAN ASSISTANT

## 2018-12-10 PROCEDURE — 97535 SELF CARE MNGMENT TRAINING: CPT

## 2018-12-10 PROCEDURE — 97530 THERAPEUTIC ACTIVITIES: CPT

## 2018-12-10 PROCEDURE — G8987 SELF CARE CURRENT STATUS: HCPCS

## 2018-12-10 PROCEDURE — 99232 SBSQ HOSP IP/OBS MODERATE 35: CPT | Performed by: PHYSICIAN ASSISTANT

## 2018-12-10 PROCEDURE — G8988 SELF CARE GOAL STATUS: HCPCS

## 2018-12-10 RX ADMIN — ATORVASTATIN CALCIUM 20 MG: 20 TABLET, FILM COATED ORAL at 18:51

## 2018-12-10 RX ADMIN — WARFARIN SODIUM 4 MG: 2 TABLET ORAL at 18:52

## 2018-12-10 RX ADMIN — DOCUSATE SODIUM 100 MG: 100 CAPSULE, LIQUID FILLED ORAL at 08:20

## 2018-12-10 RX ADMIN — ACETAMINOPHEN 975 MG: 325 TABLET, FILM COATED ORAL at 05:06

## 2018-12-10 RX ADMIN — LISINOPRIL 5 MG: 5 TABLET ORAL at 08:21

## 2018-12-10 RX ADMIN — LORAZEPAM 0.25 MG: 0.5 TABLET ORAL at 08:22

## 2018-12-10 RX ADMIN — MEMANTINE 10 MG: 5 TABLET ORAL at 08:20

## 2018-12-10 RX ADMIN — FUROSEMIDE 20 MG: 20 TABLET ORAL at 08:20

## 2018-12-10 RX ADMIN — RIVASTIGMINE 1 PATCH: 4.6 PATCH TRANSDERMAL at 09:23

## 2018-12-10 RX ADMIN — OXYCODONE HYDROCHLORIDE 5 MG: 5 TABLET ORAL at 18:52

## 2018-12-10 RX ADMIN — ACETAMINOPHEN 975 MG: 325 TABLET, FILM COATED ORAL at 22:54

## 2018-12-10 RX ADMIN — ACETAMINOPHEN 975 MG: 325 TABLET, FILM COATED ORAL at 13:38

## 2018-12-10 RX ADMIN — SERTRALINE HYDROCHLORIDE 50 MG: 50 TABLET ORAL at 08:20

## 2018-12-10 RX ADMIN — ASPIRIN 81 MG 324 MG: 81 TABLET ORAL at 08:21

## 2018-12-10 RX ADMIN — LIDOCAINE 1 PATCH: 50 PATCH CUTANEOUS at 09:22

## 2018-12-10 RX ADMIN — LORAZEPAM 0.25 MG: 0.5 TABLET ORAL at 18:53

## 2018-12-10 RX ADMIN — VITAMIN D, TAB 1000IU (100/BT) 1000 UNITS: 25 TAB at 08:21

## 2018-12-10 RX ADMIN — DOCUSATE SODIUM 100 MG: 100 CAPSULE, LIQUID FILLED ORAL at 18:52

## 2018-12-10 NOTE — SOCIAL WORK
Auth obtained  # [id number]  Subacute Level 2  Next Review is 12/17/18  Deni Rivas  [phone] phone  [phone] fax    Cm attempted to find transport tonight but was unsuccessful    Pt will d/c to [facility] tomorrow @ 1878 via [facility] [id number]

## 2018-12-10 NOTE — UTILIZATION REVIEW
Continued Stay Review    Date: 12/9/18    Vital Signs: /72 (BP Location: Right arm)   Pulse 73   Temp 97 9 °F (36 6 °C) (Oral)   Resp 18   Ht 5' 4" (1 626 m)   Wt 85 5 kg (188 lb 9 6 oz)   SpO2 95%   BMI 32 37 kg/m²     Medications:   Scheduled Meds:   Current Facility-Administered Medications:  acetaminophen 975 mg Oral Q8H [facility] [place]   aspirin 324 mg Oral Daily   atorvastatin 20 mg Oral Daily With Dinner   cholecalciferol 1,000 Units Oral Daily   docusate sodium 100 mg Oral BID   furosemide 20 mg Oral Daily   lidocaine 1 patch Topical Daily   lisinopril 5 mg Oral Daily   LORazepam 0 25 mg Oral BID   memantine 10 mg Oral Daily   oxyCODONE 2 5 mg Oral Q4H PRN   oxyCODONE 5 mg Oral Q4H PRN   rivastigmine 1 patch Transdermal Daily   senna 1 tablet Oral HS   sertraline 50 mg Oral Daily   warfarin 4 mg Oral Daily (warfarin)     PRN Meds: oxyCODONE    oxyCODONE    Abnormal Labs/Diagnostic Results:     BUN/Cr 49/1 70  PT/INR 22 4/1 96    Age/Sex: 80 y o  male     Assessment/Plan:   12/9 Trauma Progress Note  Fx L1-L3   TLSO Brace   PT/OT   Needs to have therapeutic PT/INR     Discharge Plan: [facility] rehab       [address] [place] [address] Utilization Review Department  Phone: [phone]; Fax [phone]  [email or web address]  ATTENTION: Please call with any questions or concerns to [phone]  and carefully listen to the prompts so that you are directed to the right person  Send all requests for admission clinical reviews, approved or denied determinations and any other requests to fax [phone]   All voicemails are confidential

## 2018-12-10 NOTE — PLAN OF CARE
Problem: OCCUPATIONAL THERAPY ADULT  Goal: Performs self-care activities at highest level of function for planned discharge setting  See evaluation for individualized goals  Treatment Interventions: ADL retraining, Functional transfer training, Endurance training, Cognitive reorientation, Patient/family training, Equipment evaluation/education, Compensatory technique education, Activityengagement          See flowsheet documentation for full assessment, interventions and recommendations  Outcome: Progressing  Limitation: Decreased ADL status, Decreased Safe judgement during ADL, Decreased cognition, Decreased endurance, Decreased self-care trans  Prognosis: Good  Assessment: Patient participated in Skilled OT session this date with interventions consisting of ADL re training with the use of correct body mechnaics, Energy Conservation techniques, safety awareness and fall prevention techniques,  therapeutic activities to: increase activity tolerance, increase dynamic sit/ stand balance during functional activity  and increase OOB/ sitting tolerance   Patient agreeable to OT treatment session, upon arrival patient was found seated OOB to Chair  In comparison to previous session, patient with improvements in participation in therapy, engagement in [facility], and functional transfers  Patient requiring frequent re direction, verbal cues for safety, verbal cues for correct technique, verbal cues for pacing thru activity steps, cognitive assistance to anticipate next step, one step directives and frequent rest periods  Patient continues to be functioning below baseline level, occupational performance remains limited secondary to factors listed above and increased risk for falls and injury  From OT standpoint, recommendation at time of d/c would be Short Term Rehab     Patient to benefit from continued Occupational Therapy treatment while in the hospital to address deficits as defined above and maximize level of functional independence with ADLs and functional mobility        OT Discharge Recommendation: Short Term Rehab  OT - OK to Discharge: Yes (When medically cleared)

## 2018-12-10 NOTE — PROGRESS NOTES
Progress Note - Lizeth Muniz 12/5/1927, 80 y o  male MRN: [id number]    [facility] En[facility]: [id number]    Primary Care Provider: HAILE Yanes   Date and time admitted to hospital: 12/5/2018  7:25 PM        Dementia   Assessment & Plan    - [facility] geriatric medicine recs     Ambulatory dysfunction   Assessment & Plan    - PT/OT   - Rehab placement     Compression fracture of body of thoracic vertebra (HCC)   Assessment & Plan    - TLSO brace   - Upright films stable (12/6)  - PT/OT  - Pain control  - Rehab     Coagulopathy ([address] )   Assessment & Plan    - continue Coumadin for h/o Afib       * Closed fracture of lumbar vertebral body (HCC)   Assessment & Plan    - TLSO brace   - Upright films stable (12/6)  - PT/OT  - Pain control  - Rehab         DISPOSITION/ PLAN:  Rehab placement    Chief Complaint: None    Subjective: Resting comfortably    Objective:     Meds/Allergies     Current Facility-Administered Medications:     acetaminophen (TYLENOL) tablet 975 mg, 975 mg, Oral, Q8H [facility], Mia Guerrero PA-C, 975 mg at 12/10/18 0506    aspirin chewable tablet 324 mg, 324 mg, Oral, Daily, Dorrene Garvin, DO, 324 mg at 12/10/18 0962    atorvastatin (LIPITOR) tablet 20 mg, 20 mg, Oral, Daily With Dinner, Dev R Susan, DO, 20 mg at 12/09/18 1711    cholecalciferol (VITAMIN D3) tablet 1,000 Units, 1,000 Units, Oral, Daily, Nahed Rabenold, DO, 1,000 Units at 12/10/18 0821    docusate sodium (COLACE) capsule 100 mg, 100 mg, Oral, BID, Dev R Susan, DO, 100 mg at 12/10/18 0820    furosemide (LASIX) tablet 20 mg, 20 mg, Oral, Daily, Dev R Susan, DO, 20 mg at 12/10/18 0820    lidocaine (LIDODERM) 5 % patch 1 patch, 1 patch, Topical, Daily, Alphonsus Stock Susan, DO, 1 patch at 12/10/18 1871    lisinopril (ZESTRIL) tablet 5 mg, 5 mg, Oral, Daily, Dev R Susan, DO, 5 mg at 12/10/18 0229    LORazepam (ATIVAN) tablet 0 25 mg, 0 25 mg, Oral, BID, Gilmar Chavez PA-C, 0 25 mg at 12/10/18 7606    memantine (NAMENDA) tablet 10 mg, 10 mg, Oral, Daily, Shagufta Painter PA-C, 10 mg at 12/10/18 0820    oxyCODONE (ROXICODONE) IR tablet 2 5 mg, 2 5 mg, Oral, Q4H PRN, Shagufta Painter PA-C    oxyCODONE (ROXICODONE) IR tablet 5 mg, 5 mg, Oral, Q4H PRN, COURTNEY Webb-C, 5 mg at 12/06/18 1829    rivastigmine (EXELON) 4 6 mg/24 hr TD 24 hr patch 1 patch, 1 patch, Transdermal, Daily, Manuela Estrada, DO, 1 patch at 12/10/18 1561    senna (SENOKOT) tablet 8 6 mg, 1 tablet, Oral, HS, Dequan Romero PA-C, 8 6 mg at 12/09/18 2146    sertraline (ZOLOFT) tablet 50 mg, 50 mg, Oral, Daily, Dev R Susan, DO, 50 mg at 12/10/18 0820    warfarin (COUMADIN) tablet 4 mg, 4 mg, Oral, Daily (warfarin), Ronit Arthur Susan, DO, 4 mg at 12/09/18 1711    Vitals: Blood pressure 126/72, pulse 73, temperature 97 9 °F (36 6 °C), temperature source Oral, resp  rate 18, height 5' 4" (1 626 m), weight 85 5 kg (188 lb 9 6 oz), SpO2 95 %  Body mass index is 32 37 kg/m²   SpO2: SpO2: 95 %    ABG: No results found for: PHART, KBL4TFU, PO2ART, OFI6AHN, Y2GKRJGT, BEART, SOURCE      Intake/Output Summary (Last 24 hours) at 12/10/18 1125  Last data filed at 12/10/18 6488   Gross per 24 hour   Intake              340 ml   Output             1312 ml   Net             -972 ml       Invasive Devices     Drain            External Urinary Catheter Small less than 1 day                        Nutrition/GI Proph/Bowel Reg: Colace, Senokot    Pain management regimen: Tylenol, Lidoderm, Oxycodone    VTE Prophylaxis: Coumadin    Physical Exam:   Constitution: patient appears comfortable, no acute distress  HEENT: normocephalic, atraumatic, PERRLA, clear speech  CV: regular rate and rhythm, no edema, cap refill intact b/l LE's  Pulm: CTA, no wheezes, rhonchi or crackles, unlabored, equal bilaterally  Abd: soft, nontender, nondistended, active bowel sounds  Musc: moves all extremities, equal strength, no calf tenderness  Neuro: A&O, no focal deficits  Skin: no rash or breakdown, warm      Lab Results:   BMP/CMP:   Lab Results   Component Value Date    SODIUM 140 12/10/2018    K 4 5 12/10/2018     12/10/2018    CO2 24 12/10/2018    BUN 49 (H) 12/10/2018    CREATININE 1 73 (H) 12/10/2018    CALCIUM 8 8 12/10/2018    EGFR 34 12/10/2018   , CBC: No results found for: WBC, HGB, HCT, MCV, PLT, ADJUSTEDWBC, MCH, MCHC, RDW, MPV, NRBC and Coagulation:   Lab Results   Component Value Date    INR 2 22 (H) 12/10/2018     Imaging/EKG Studies: Results: I have personally reviewed pertinent reports

## 2018-12-10 NOTE — PHYSICAL THERAPY NOTE
PHYSICAL THERAPY NOTE      Patient Name: Sarah Mazariegos  Today's Date: 12/10/2018        12/10/18 1124   Pain Assessment   Pain Assessment No/denies pain   Restrictions/Precautions   Weight Bearing Precautions Per Order No   Braces or Orthoses TLSO  (for HOB >45 deg and OOB/upright)   Other Precautions Cognitive; Fall Risk;Spinal precautions; Bed Alarm; Chair Alarm  (potential for combativeness)   General   Chart Reviewed Yes   Response to Previous Treatment Patient unable to report, no changes reported from family or staff   Family/Caregiver Present No   Cognition   Overall Cognitive Status Impaired   Arousal/Participation Alert; Cooperative;Responsive   Attention Attends with cues to redirect   Orientation Level Disoriented to place; Disoriented to situation;Oriented to person;Disoriented to time   Memory Decreased long term memory;Decreased recall of precautions;Decreased recall of recent events   Following Commands Follows one step commands with increased time or repetition   Subjective   Subjective "How'd I get here?"   Bed Mobility   Additional Comments pt already OOB, seated in bedside chair with TLSO donned, however, not donned correctly  Additional time spent re-adjusting TLSO prior to standing pt  Transfers   Sit to Stand 4  Minimal assistance   Additional items Assist x 1; Increased time required;Verbal cues   Stand to Sit 4  Minimal assistance  (cues for hand placement, uncontrolled descent)   Additional items Assist x 1; Increased time required;Verbal cues   Ambulation/Elevation   Gait pattern Short stride; Shuffling;Decreased foot clearance; Foward flexed; Improper Weight shift  (max cues to keep RW close)   Gait Assistance 4  Minimal assist   Additional items Assist x 1;Verbal cues; Tactile cues   Assistive Device Rolling walker   Distance 45 ft x2   Balance   Static Sitting Fair   Dynamic Sitting Fair -   Static Standing Fair -   Dynamic Standing Poor +   Ambulatory Poor +   Endurance Deficit   Endurance Deficit Yes  (generalized weakness/deconditioning)   Activity Tolerance   Activity Tolerance Patient limited by fatigue   Medical Staff Made Aware yes, RN Jameel Rogers gave okay for PT session   Assessment   Prognosis Good   Problem List Decreased strength;Decreased range of motion;Decreased endurance; Impaired balance;Decreased mobility; Decreased coordination;Decreased cognition; Impaired judgement;Decreased safety awareness; Impaired vision; Impaired hearing;Orthopedic restrictions;Pain   Assessment Pt confused throughout session, but not agitated or combative today  Pt required Delores overall for sit<>stand and ambulation of 45 ft x2 with RW  He required frequent cuing for approximation to RW and continues to be at very high risk for falls  He would benefit from inpt rehab setting once medically cleared for d/c in order to improve safety with all functional mobility  Will follow  Goals   Patient Goals none expressed   STG Expiration Date 12/17/18   Short Term Goal #1 1) Bed mobility skills with MINA2) Functional transfers with minAI 3) Ambulation with RW 25' x2 w/ Delores/ TLSO' without LOB and stable vitals  4) Improve balance grades to F+ w/ AD 6) Improve LE strength grades by 1  7) LE HEP w/ S 8) PT for ongoing pt and family education; DME needs and D/C planning  Treatment Day 1   Plan   Treatment/Interventions Functional transfer training;LE strengthening/ROM; Elevations; Therapeutic exercise; Endurance training;Cognitive reorientation;Patient/family training;Equipment eval/education; Bed mobility;Gait training; Compensatory technique education;OT;Spoke to nursing   Progress Progressing toward goals   PT Frequency (3-5x/wk)   Recommendation   Recommendation Post acute IP rehab   Equipment Recommended Walker   PT - OK to Discharge Yes  (to rehab once medically cleared)     Sasha Mendoza, PT

## 2018-12-10 NOTE — PLAN OF CARE
Problem: PHYSICAL THERAPY ADULT  Goal: Performs mobility at highest level of function for planned discharge setting  See evaluation for individualized goals  Treatment/Interventions: Functional transfer training, LE strengthening/ROM, Elevations, Therapeutic exercise, Endurance training, Cognitive reorientation, Patient/family training, Equipment eval/education, Bed mobility, Gait training, Compensatory technique education, OT, Spoke to nursing  Equipment Recommended: Wheelchair, Daija Barnett       See flowsheet documentation for full assessment, interventions and recommendations  Outcome: Progressing  Prognosis: Good  Problem List: Decreased strength, Decreased range of motion, Decreased endurance, Impaired balance, Decreased mobility, Decreased coordination, Decreased cognition, Impaired judgement, Decreased safety awareness, Impaired vision, Impaired hearing, Orthopedic restrictions, Pain  Assessment: Pt confused throughout session, but not agitated or combative today  Pt required Delores overall for sit<>stand and ambulation of 45 ft x2 with RW  He required frequent cuing for approximation to RW and continues to be at very high risk for falls  He would benefit from inpt rehab setting once medically cleared for d/c in order to improve safety with all functional mobility  Will follow  Recommendation: Post acute IP rehab     PT - OK to Discharge: Yes (to rehab once medically cleared)    See flowsheet documentation for full assessment

## 2018-12-10 NOTE — SOCIAL WORK
Pt accepted to [facility]  Family would like this facility  Auth begun via telephone and CM faxed clinical to [place]  Awaiting their determination

## 2018-12-10 NOTE — OCCUPATIONAL THERAPY NOTE
Occupational Therapy Treatment Note      Patience Zhaoigan    12/10/2018    Patient Active Problem List   Diagnosis    Traumatic ecchymosis of flank    Coagulopathy ([facility] [address] )    Closed fracture of lumbar vertebral body (HCC)    Compression fracture of body of thoracic vertebra (HCC)    Ambulatory dysfunction    Fall    Dementia    Social isolation related to decreased hearing    [place] (hard of hearing)       Past Medical History:   Diagnosis Date    Anxiety     Dementia     DJD (degenerative joint disease)     Hyperlipidemia     Hypertension     Psychiatric disorder     anxiety depresion        History reviewed  No pertinent surgical history  12/10/18 1133   Restrictions/Precautions   Weight Bearing Precautions Per Order No   Braces or Orthoses TLSO  (HOB >45degrees and OOB when upright)   Other Precautions Cognitive; Chair Alarm; Fall Risk;Pain;Multiple lines; Hard of Hearing  (Potential for agitation/combativeness)   Lifestyle   Autonomy RECEIVES ASSIST WITH ADLS - AMBULATES WITH RW PER PT - MEALS/HOMEMAKING PROVIDED   Reciprocal Relationships SUPPORTIVE FAMILY   Service to Others RETIRED   Intrinsic Gratification SEDENTARY- UNABLE TO ID   Pain Assessment   Pain Assessment No/denies pain   Pain Score No Pain   ADL   Grooming Assistance 5  Supervision/Setup   Grooming Deficit Setup;Verbal cueing;Supervision/safety; Increased time to complete;Wash/dry hands; Wash/dry face; Teeth care   Grooming Comments Pt required VC for sequencing t/o grooming tasks   Functional Standing Tolerance   Time ~3-5 minutes    Activity Functional mobility w/ Min A w/ RW   Bed Mobility   Additional Comments Pt found OOB in chair w/ chair alarm on upon OT arrival  Pt left OOB in chair w/ chair alarm on and all needs in reach s/p OT session  Transfers   Sit to Stand 4  Minimal assistance   Additional items Assist x 1; Increased time required;Verbal cues;Armrests   Stand to Sit 4  Minimal assistance   Additional items Increased time required;Verbal cues;Armrests  (VC for hand placement and controlled descent)   Functional Mobility   Functional Mobility 4  Minimal assistance   Additional Comments Pt demonstrated ability to ambulate short household distance into hallway w/ Min A x1  Pt required frequent VC for RW management during ambulation  Additional items Rolling walker   Cognition   Overall Cognitive Status Impaired   Arousal/Participation Alert; Cooperative   Attention Attends with cues to redirect   Orientation Level Oriented to person;Disoriented to place; Disoriented to time;Disoriented to situation   Memory Decreased recall of precautions;Decreased recall of recent events;Decreased short term memory   Following Commands Follows one step commands with increased time or repetition   Comments Pt presented pleasantly confused t/o OT session  Pt required frequent VC to maintain attention to tasks at hand  Additional Activities   Additional Activities Comments Pt required Total A for TLSO brace management/adjustment  Activity Tolerance   Activity Tolerance Patient limited by fatigue;Patient tolerated treatment well   Medical Staff Made Aware PT Angela Bishop; VERENICE Rogers cleared Pt for OT treatment   Assessment   Assessment Patient participated in Skilled OT session this date with interventions consisting of ADL re training with the use of correct body mechnaics, Energy Conservation techniques, safety awareness and fall prevention techniques,  therapeutic activities to: increase activity tolerance, increase dynamic sit/ stand balance during functional activity  and increase OOB/ sitting tolerance   Patient agreeable to OT treatment session, upon arrival patient was found seated OOB to Chair  In comparison to previous session, patient with improvements in participation in therapy, engagement in [facility], and functional transfers   Patient requiring frequent re direction, verbal cues for safety, verbal cues for correct technique, verbal cues for pacing thru activity steps, cognitive assistance to anticipate next step, one step directives and frequent rest periods  Patient continues to be functioning below baseline level, occupational performance remains limited secondary to factors listed above and increased risk for falls and injury  From OT standpoint, recommendation at time of d/c would be Short Term Rehab  Patient to benefit from continued Occupational Therapy treatment while in the hospital to address deficits as defined above and maximize level of functional independence with ADLs and functional mobility  Plan   Treatment Interventions ADL retraining;Functional transfer training;UE strengthening/ROM; Endurance training;Cognitive reorientation;Patient/family training;Equipment evaluation/education; Activityengagement; Energy conservation   Goal Expiration Date 12/21/18   Treatment Day 1   OT Frequency 3-5x/wk   Recommendation   OT Discharge Recommendation Short Term Rehab   OT - OK to Discharge Yes  (When medically cleared)   Barthel Index   Feeding 5   Bathing 0   Grooming Score 0   Dressing Score 0   Bladder Score 0   Bowels Score 5   Toilet Use Score 5   Transfers (Bed/Chair) Score 10   Mobility (Level Surface) Score 0   Stairs Score 0   Barthel Index Score 25   Modified Beau Scale   Modified [place] Scale 4       Maxine Ovalles MS, OTR/L

## 2018-12-11 PROBLEM — R79.89 ELEVATED SERUM CREATININE: Status: ACTIVE | Noted: 2018-12-11

## 2018-12-11 LAB
ANION GAP SERPL CALCULATED.3IONS-SCNC: 8 MMOL/L (ref 4–13)
BUN SERPL-MCNC: 63 MG/DL (ref 5–25)
CALCIUM SERPL-MCNC: 9.2 MG/DL (ref 8.3–10.1)
CHLORIDE SERPL-SCNC: 104 MMOL/L (ref 100–108)
CO2 SERPL-SCNC: 24 MMOL/L (ref 21–32)
CREAT SERPL-MCNC: 2.07 MG/DL (ref 0.6–1.3)
GFR SERPL CREATININE-BSD FRML MDRD: 27 ML/MIN/1.73SQ M
GLUCOSE SERPL-MCNC: 89 MG/DL (ref 65–140)
INR PPP: 2.26 (ref 0.86–1.17)
POTASSIUM SERPL-SCNC: 4.4 MMOL/L (ref 3.5–5.3)
PROTHROMBIN TIME: 25 SECONDS (ref 11.8–14.2)
SODIUM SERPL-SCNC: 136 MMOL/L (ref 136–145)

## 2018-12-11 PROCEDURE — 80048 BASIC METABOLIC PNL TOTAL CA: CPT | Performed by: PHYSICIAN ASSISTANT

## 2018-12-11 PROCEDURE — 99232 SBSQ HOSP IP/OBS MODERATE 35: CPT | Performed by: PHYSICIAN ASSISTANT

## 2018-12-11 PROCEDURE — 85610 PROTHROMBIN TIME: CPT | Performed by: PHYSICIAN ASSISTANT

## 2018-12-11 RX ADMIN — LORAZEPAM 0.25 MG: 0.5 TABLET ORAL at 16:53

## 2018-12-11 RX ADMIN — SENNOSIDES 8.6 MG: 8.6 TABLET, FILM COATED ORAL at 21:05

## 2018-12-11 RX ADMIN — ACETAMINOPHEN 975 MG: 325 TABLET, FILM COATED ORAL at 13:28

## 2018-12-11 RX ADMIN — WARFARIN SODIUM 4 MG: 2 TABLET ORAL at 16:53

## 2018-12-11 RX ADMIN — FUROSEMIDE 20 MG: 20 TABLET ORAL at 08:11

## 2018-12-11 RX ADMIN — DOCUSATE SODIUM 100 MG: 100 CAPSULE, LIQUID FILLED ORAL at 16:53

## 2018-12-11 RX ADMIN — ACETAMINOPHEN 975 MG: 325 TABLET, FILM COATED ORAL at 05:41

## 2018-12-11 RX ADMIN — SERTRALINE HYDROCHLORIDE 50 MG: 50 TABLET ORAL at 08:11

## 2018-12-11 RX ADMIN — ACETAMINOPHEN 975 MG: 325 TABLET, FILM COATED ORAL at 21:05

## 2018-12-11 RX ADMIN — SODIUM CHLORIDE 500 ML: 0.9 INJECTION, SOLUTION INTRAVENOUS at 10:27

## 2018-12-11 RX ADMIN — LORAZEPAM 0.25 MG: 0.5 TABLET ORAL at 08:11

## 2018-12-11 RX ADMIN — RIVASTIGMINE 1 PATCH: 4.6 PATCH TRANSDERMAL at 08:12

## 2018-12-11 RX ADMIN — LIDOCAINE 1 PATCH: 50 PATCH CUTANEOUS at 08:11

## 2018-12-11 RX ADMIN — MEMANTINE 10 MG: 5 TABLET ORAL at 08:11

## 2018-12-11 RX ADMIN — DOCUSATE SODIUM 100 MG: 100 CAPSULE, LIQUID FILLED ORAL at 08:10

## 2018-12-11 RX ADMIN — VITAMIN D, TAB 1000IU (100/BT) 1000 UNITS: 25 TAB at 08:11

## 2018-12-11 RX ADMIN — ASPIRIN 81 MG 324 MG: 81 TABLET ORAL at 08:10

## 2018-12-11 RX ADMIN — LISINOPRIL 5 MG: 5 TABLET ORAL at 08:11

## 2018-12-11 RX ADMIN — ATORVASTATIN CALCIUM 20 MG: 20 TABLET, FILM COATED ORAL at 16:53

## 2018-12-11 NOTE — SOCIAL WORK
Pt's d/c cancel due to abnormal labs   Cm informed [facility] and will inform [facility]

## 2018-12-11 NOTE — QUICK NOTE
Called facility in which patient was residing and asked for baseline creatinine which date denoted is 1 430 most recent BMP on 10/10/2018  Patient's case at this time was discussed with nephrology and they recommended holding off on the Lasix and lisinopril in the a m  And repeat a BMP  The creatinine continues to worsen in a m  They stated that we could contact them and place a consult  Discharge will be held today  Case management was informed  Will call family

## 2018-12-11 NOTE — ASSESSMENT & PLAN NOTE
-unaware of baseline  -creatinine 2 07 up from 1 73  -called primary care provider to discuss baseline creatinine; there was no answer  -left message   -consider holding Lasix and lisinopril tomorrow a m   -full bladder scan p r n   -per nursing making urine but is incontinent  -will give 500 cc bolus

## 2018-12-11 NOTE — PROGRESS NOTES
Progress Note - Rocky Brown 12/5/1927, 80 y o  male MRN: [id number]    Unit/Bed#: [facility] 821-01 Encounter: [id number]    Primary Care Provider: HAILE Bautista   Date and time admitted to hospital: 12/5/2018  7:25 PM        Elevated serum creatinine   Assessment & Plan    -unaware of baseline  -creatinine 2 07 up from 1 73  -called primary care provider to discuss baseline creatinine; there was no answer  -left message   -consider holding Lasix and lisinopril tomorrow a m   -full bladder scan p r n   -per nursing making urine but is incontinent  -will give 500 cc bolus     Dementia   Assessment & Plan    - Appreciate geriatric medicine recs     Ambulatory dysfunction   Assessment & Plan    - PT/OT   - Rehab placement     Compression fracture of body of thoracic vertebra (HCC)   Assessment & Plan    - TLSO brace   - Upright films stable (12/6)  - PT/OT  - Pain control  - Rehab     Coagulopathy ([address] )   Assessment & Plan    - continue Coumadin for h/o Afib  - INR 2 29       Traumatic ecchymosis of flank   Assessment & Plan    - On coumadin, per CT scan no evidence of hematoma       * Closed fracture of lumbar vertebral body (HCC)   Assessment & Plan    - TLSO brace   - Upright films stable (12/6)  - PT/OT  - Pain control  - Rehab         PT and OT: rehab  DVT prophylaxis:  Therapeutic on Coumadin    Disposition:  Follow-up family practitioner for creatinine at baseline outside the hospital   Otherwise patient is stable this a m  Subjective/Objective   Chief Complaint: No new complaints  Subjective: Patient offers no new complaints  Reports no new chest pain or shortness of breath  Reports that he is urinating multiple times a day  Reports he had a bowel movement yesterday  Reports he is tolerating a diet  Reports no new abdominal pain  No nausea or vomiting      Objective:     Meds/Allergies   Prescriptions Prior to Admission   Medication Sig Dispense Refill Last Dose    acetaminophen (TYLENOL) 325 mg tablet Take 650 mg by mouth 2 (two) times a day     12/5/2018 at Unknown time    acetaminophen (TYLENOL) 325 mg tablet Take 650 mg by mouth every 6 (six) hours as needed for mild pain or moderate pain   12/5/2018 at Unknown time    ASPIRIN PO Take 325 mg by mouth   12/5/2018 at Unknown time    atorvastatin (LIPITOR) 20 mg tablet Take 20 mg by mouth daily at bedtime     12/5/2018 at Unknown time    dextromethorphan-guaiFENesin (ROBITUSSIN DM)  mg/5 mL syrup Take 5 mL by mouth every 6 (six) hours as needed for cough   12/5/2018 at Unknown time    furosemide (LASIX) 20 mg tablet Take 20 mg by mouth daily     12/5/2018 at Unknown time    Lidocaine (ASPERCREME LIDOCAINE) 4 % PTCH Apply topically   12/5/2018 at Unknown time    lisinopril (ZESTRIL) 5 mg tablet Take 5 mg by mouth daily     12/5/2018 at Unknown time    LORazepam (ATIVAN) 0 5 mg tablet Take by mouth 3 (three) times a day   12/5/2018 at Unknown time    Memantine HCl ER (NAMENDA XR) 28 MG CP24 Take 28 mg by mouth daily   12/5/2018 at Unknown time    neomycin-bacitracin-polymyxin (NEOSPORIN) 5-400-5,000 ointment Apply topically 2 (two) times a day as needed (Skin tears)   Past Week at Unknown time    sertraline (ZOLOFT) 50 mg tablet Take 50 mg by mouth daily   12/5/2018 at Unknown time    warfarin (COUMADIN) 4 mg tablet Take 4 mg by mouth daily at bedtime     12/5/2018 at Unknown time    rivastigmine (EXELON) 4 6 mg/24 hr TD 24 hr patch Place 1 patch on the skin   Unknown at Unknown time       Vitals: Blood pressure 134/66, pulse 75, temperature (!) 97 4 °F (36 3 °C), temperature source Oral, resp  rate 18, height 5' 4" (1 626 m), weight 85 5 kg (188 lb 9 6 oz), SpO2 97 %  Body mass index is 32 37 kg/m²   SpO2: SpO2: 97 %, SpO2 Device: O2 Device: None (Room air)    ABG: No results found for: PHART, CFH5WWB, PO2ART, IBX3YQH, P7EWHQFT, BEART, SOURCE      Intake/Output Summary (Last 24 hours) at 12/11/18 5923  Last data filed at 12/11/18 0631   Gross per 24 hour   Intake              360 ml   Output              520 ml   Net             -160 ml       Invasive Devices          No matching active lines, drains, or airways          Nutrition/GI Proph/Bowel Reg:  Continue current diet    Physical Exam:   GENERAL APPEARANCE:  No acute distress, well-appearing  HEENT:  Normocephalic  CV:  Regular rate and rhythm  LUNGS:  CTA bilaterally  ABD:  Bowel sounds x4, nontender  EXT:  +2 pulses on extremities, neurovascularly intact  NEURO:  Cranial nerves 2-12 intact, no focal deficits  SKIN:  Warm, dry, intact    Lab Results:   Results: I have personally reviewed pertinent reports   , BMP/CMP:   Lab Results   Component Value Date    SODIUM 136 12/11/2018    K 4 4 12/11/2018     12/11/2018    CO2 24 12/11/2018    BUN 63 (H) 12/11/2018    CREATININE 2 07 (H) 12/11/2018    CALCIUM 9 2 12/11/2018    EGFR 27 12/11/2018    and CBC: No results found for: WBC, HGB, HCT, MCV, PLT, ADJUSTEDWBC, MCH, MCHC, RDW, MPV, NRBC  Imaging/EKG Studies: Results: I have personally reviewed pertinent reports  Other Studies:  No other studies  VTE Prophylaxis:  SCDs and therapeutic on Coumadin  Nurse rounds completed  Family updated today

## 2018-12-12 LAB
ANION GAP SERPL CALCULATED.3IONS-SCNC: 10 MMOL/L (ref 4–13)
ANION GAP SERPL CALCULATED.3IONS-SCNC: 9 MMOL/L (ref 4–13)
BUN SERPL-MCNC: 65 MG/DL (ref 5–25)
BUN SERPL-MCNC: 67 MG/DL (ref 5–25)
CALCIUM SERPL-MCNC: 8.4 MG/DL (ref 8.3–10.1)
CALCIUM SERPL-MCNC: 8.4 MG/DL (ref 8.3–10.1)
CHLORIDE SERPL-SCNC: 107 MMOL/L (ref 100–108)
CHLORIDE SERPL-SCNC: 109 MMOL/L (ref 100–108)
CO2 SERPL-SCNC: 22 MMOL/L (ref 21–32)
CO2 SERPL-SCNC: 23 MMOL/L (ref 21–32)
CREAT SERPL-MCNC: 1.92 MG/DL (ref 0.6–1.3)
CREAT SERPL-MCNC: 1.98 MG/DL (ref 0.6–1.3)
GFR SERPL CREATININE-BSD FRML MDRD: 29 ML/MIN/1.73SQ M
GFR SERPL CREATININE-BSD FRML MDRD: 30 ML/MIN/1.73SQ M
GLUCOSE SERPL-MCNC: 107 MG/DL (ref 65–140)
GLUCOSE SERPL-MCNC: 86 MG/DL (ref 65–140)
INR PPP: 2.47 (ref 0.86–1.17)
POTASSIUM SERPL-SCNC: 4.5 MMOL/L (ref 3.5–5.3)
POTASSIUM SERPL-SCNC: 4.6 MMOL/L (ref 3.5–5.3)
PROTHROMBIN TIME: 26.8 SECONDS (ref 11.8–14.2)
SODIUM SERPL-SCNC: 139 MMOL/L (ref 136–145)
SODIUM SERPL-SCNC: 141 MMOL/L (ref 136–145)

## 2018-12-12 PROCEDURE — 85610 PROTHROMBIN TIME: CPT | Performed by: PHYSICIAN ASSISTANT

## 2018-12-12 PROCEDURE — 97535 SELF CARE MNGMENT TRAINING: CPT

## 2018-12-12 PROCEDURE — 80048 BASIC METABOLIC PNL TOTAL CA: CPT | Performed by: SURGERY

## 2018-12-12 PROCEDURE — 99232 SBSQ HOSP IP/OBS MODERATE 35: CPT | Performed by: SURGERY

## 2018-12-12 PROCEDURE — 80048 BASIC METABOLIC PNL TOTAL CA: CPT | Performed by: PHYSICIAN ASSISTANT

## 2018-12-12 PROCEDURE — 97116 GAIT TRAINING THERAPY: CPT

## 2018-12-12 RX ORDER — OXYCODONE HYDROCHLORIDE 5 MG/1
2.5 TABLET ORAL EVERY 4 HOURS PRN
Status: DISCONTINUED | OUTPATIENT
Start: 2018-12-12 | End: 2018-12-13 | Stop reason: HOSPADM

## 2018-12-12 RX ADMIN — LORAZEPAM 0.25 MG: 0.5 TABLET ORAL at 17:22

## 2018-12-12 RX ADMIN — MEMANTINE 10 MG: 5 TABLET ORAL at 08:07

## 2018-12-12 RX ADMIN — SODIUM CHLORIDE, SODIUM LACTATE, POTASSIUM CHLORIDE, AND CALCIUM CHLORIDE 500 ML: .6; .31; .03; .02 INJECTION, SOLUTION INTRAVENOUS at 07:42

## 2018-12-12 RX ADMIN — LORAZEPAM 0.25 MG: 0.5 TABLET ORAL at 08:07

## 2018-12-12 RX ADMIN — RIVASTIGMINE 1 PATCH: 4.6 PATCH TRANSDERMAL at 08:07

## 2018-12-12 RX ADMIN — WARFARIN SODIUM 4 MG: 2 TABLET ORAL at 17:22

## 2018-12-12 RX ADMIN — ACETAMINOPHEN 975 MG: 325 TABLET, FILM COATED ORAL at 17:22

## 2018-12-12 RX ADMIN — LIDOCAINE 1 PATCH: 50 PATCH CUTANEOUS at 08:08

## 2018-12-12 RX ADMIN — VITAMIN D, TAB 1000IU (100/BT) 1000 UNITS: 25 TAB at 08:07

## 2018-12-12 RX ADMIN — DOCUSATE SODIUM 100 MG: 100 CAPSULE, LIQUID FILLED ORAL at 08:07

## 2018-12-12 RX ADMIN — DOCUSATE SODIUM 100 MG: 100 CAPSULE, LIQUID FILLED ORAL at 17:22

## 2018-12-12 RX ADMIN — ATORVASTATIN CALCIUM 20 MG: 20 TABLET, FILM COATED ORAL at 17:22

## 2018-12-12 RX ADMIN — ACETAMINOPHEN 975 MG: 325 TABLET, FILM COATED ORAL at 21:24

## 2018-12-12 RX ADMIN — ACETAMINOPHEN 975 MG: 325 TABLET, FILM COATED ORAL at 06:14

## 2018-12-12 RX ADMIN — SENNOSIDES 8.6 MG: 8.6 TABLET, FILM COATED ORAL at 21:24

## 2018-12-12 RX ADMIN — SERTRALINE HYDROCHLORIDE 50 MG: 50 TABLET ORAL at 08:07

## 2018-12-12 RX ADMIN — ASPIRIN 81 MG 324 MG: 81 TABLET ORAL at 08:07

## 2018-12-12 NOTE — SOCIAL WORK
Pt tentatively scheduled to d/c to [facility] @[facility]12 via [facility] pending medical stability

## 2018-12-12 NOTE — PROGRESS NOTES
Progress Note - Trauma   Tang Ware 80 y o  male MRN: [id number]  Unit/Bed#: [facility] 821-01 Encounter: [id number]    Assessment/Plan:    Elevated serum creatinine   Assessment & Plan    - Giving 500cc bolus LR and rechecking noon BMP  -Discharge pending improvement  -Baseline appears to be around 1 4 per PCP  -creatinine 1 98 from 2 07  -Holding Lasix and lisinopril     Dementia   Assessment & Plan    - Appreciate geriatric medicine recs     Ambulatory dysfunction   Assessment & Plan    - PT/OT   - Rehab placement     Compression fracture of body of thoracic vertebra (HCC)   Assessment & Plan    - TLSO brace   - Upright films stable (12/6)  - PT/OT  - Pain control  - Rehab placement     Coagulopathy (HCC)   Assessment & Plan    - continue Coumadin for h/o Afib  - INR 2 47 (from 2 29)       Traumatic ecchymosis of flank   Assessment & Plan    - On coumadin, per CT scan no evidence of hematoma       * Closed fracture of lumbar vertebral body (HCC)   Assessment & Plan    - TLSO brace   - Upright films stable (12/6)  - PT/OT  - Pain control  - Rehab palcement           Subjective/Objective     Subjective: No acute events overnight  Patient doing well this AM  Pain well controlled  Tolerating diet  +BM/flatus  Objective:     Vitals: Blood pressure 151/72, pulse 69, temperature (!) 97 4 °F (36 3 °C), temperature source Oral, resp  rate 18, height 5' 4" (1 626 m), weight 85 5 kg (188 lb 9 6 oz), SpO2 97 %  ,Body mass index is 32 37 kg/m²  I/O       12/10 0701 - 12/11 0700 12/11 0701 - 12/12 0700 12/12 0701 - 12/13 0700    P  O  540 698     IV Piggyback  500     Total Intake(mL/kg) 540 (6 3) 1198 (14)     Urine (mL/kg/hr) 520 (0 3) 728 (0 4)     Stool 0      Total Output 520 728      Net +20 +470             Unmeasured Urine Occurrence 3 x 5 x     Unmeasured Stool Occurrence 2 x 1 x           Physical Exam:  GEN: NAD  HEENT: MMM  CV: RRR  Lung: Normal effort  Ab: Soft, NT/ND  Extrem: No CCE  Neuro:  A+Ox3    Lab, Imaging and other studies:   CBC with diff: No results found for: WBC, HGB, HCT, MCV, PLT, ADJUSTEDWBC, MCH, MCHC, RDW, MPV, NRBC, BMP/CMP:   Lab Results   Component Value Date    SODIUM 141 12/12/2018    K 4 5 12/12/2018     (H) 12/12/2018    CO2 22 12/12/2018    BUN 65 (H) 12/12/2018    CREATININE 1 98 (H) 12/12/2018    CALCIUM 8 4 12/12/2018    EGFR 29 12/12/2018     VTE Pharmacologic Prophylaxis: Warfarin (Coumadin)  VTE Mechanical Prophylaxis: sequential compression device

## 2018-12-12 NOTE — RESTORATIVE TECHNICIAN NOTE
Restorative Specialist Mobility Note       Activity: Chair, Dangle, Stand at bedside, Turn (Pt left sitting in chair at bedside with call bell, phone, and tray within reach  Chair alarm on )     Assistive Device:  (HHA x1)        Repositioned: Up in chair, Sitting              Anti-Embolism Device On:  Bilateral, Sequential compression devices, below knee

## 2018-12-12 NOTE — OCCUPATIONAL THERAPY NOTE
OccupationalTherapy Progress Note     Patient Name: Judy Tobin  Today's Date: 12/12/2018  Problem List  Patient Active Problem List   Diagnosis    Traumatic ecchymosis of flank    [facility] [address]    Closed fracture of lumbar vertebral body ([facility] [address] )    Compression fracture of body of thoracic vertebra ([facility] [address] )    Ambulatory dysfunction    Fall    Dementia    Social isolation related to decreased hearing    [place] (hard of hearing)    Elevated serum creatinine           12/12/18 0842   Restrictions/Precautions   Weight Bearing Precautions Per Order No   Braces or Orthoses TLSO   Other Precautions Cognitive; Chair Alarm; Bed Alarm; Fall Risk;Telemetry;Multiple lines;Hard of hearing   General   Response to Previous Treatment Patient with no complaints from previous session   Lifestyle   Autonomy RECEIVES ASSIST WITH ADLS - AMBULATES WITH RW PER PT - MEALS/HOMEMAKING PROVIDED   Reciprocal Relationships SUPPORTIVE FAMILY   Service to Others RETIRED   Intrinsic Gratification enjoys reading the paper   Pain Assessment   Pain Assessment No/denies pain   Pain Score No Pain   Hospital Pain Intervention(s) Repositioned; Ambulation/increased activity; Emotional support   ADL   Grooming Assistance 4  Minimal Assistance   Grooming Deficit Steadying;Verbal cueing; Impulsive;Supervision/safety; Increased time to complete;Standing with assistive device; Wash/dry hands; Wash/dry face; Teeth care;Brushing hair   Light Housekeeping   Light Housekeeping Level Walker   Light Housekeeping Level of Assistance Contact guard   Light Housekeeping cleaned up OJ spill on table while standing w/ RW   Functional Standing Tolerance   Time ~5 minutes, ~ 4 minutes   Activity teeth care standing edge of chair, grooming standing at sink use sink for extra support   Comments use of RW   Bed Mobility   Additional Comments pt oob in chair upon arrival   Transfers   Sit to Stand 5  Supervision   Additional items Assist x 1; Increased time required;Verbal cues;Impulsive   Stand to Sit 5  Supervision   Additional items Assist x 1; Increased time required;Verbal cues   Functional Mobility   Functional Mobility 4  Minimal assistance   Additional Comments to & from BR   Additional items Rolling walker   Cognition   Overall Cognitive Status Impaired   Arousal/Participation Alert; Cooperative   Attention Attends with cues to redirect   Orientation Level Oriented to person;Disoriented to place; Disoriented to time;Disoriented to situation   Memory Decreased recall of precautions;Decreased recall of recent events   Following Commands Follows one step commands with increased time or repetition   Activity Tolerance   Activity Tolerance Patient limited by fatigue   Medical Staff Made Aware okay to see per RN Austen   Assessment   Assessment Patient participated in Skilled OT session this date with interventions consisting of ADL re training with the use of correct body mechnaics, Energy Conservation techniques, Work simplification skills , safety awareness and fall prevention techniques, maintaining spinal precautions, therapeutic exercise to: increase functional use of BUEs, increase BUE muscle strength , increase standing tolerance time with unilateral UE support to complete sink level ADLs, increase dynamic sit/ stand balance during functional activity , increase postural control, increase trunk control and increase OOB/ sitting tolerance   Patient agreeable to OT treatment session, upon arrival patient was found seated OOB to Chair  In comparison to previous session, patient with improvements in transfers and functional mobility   Patient requiring verbal cues for safety, verbal cues for correct technique, verbal cues for pacing thru activity steps and cognitive assistance to anticipate next step  Patient continues to be functioning below baseline level, occupational performance remains limited secondary to factors listed above and increased risk for falls and injury     From OT standpoint, recommendation at time of d/c would be Short Term Rehab  Patient to benefit from continued Occupational Therapy treatment while in the hospital to address deficits as defined above and maximize level of functional independence with ADLs and functional mobility      Plan   Goal Expiration Date 12/21/18   Treatment Day 2   OT Frequency 3-5x/wk   Recommendation   OT Discharge Recommendation Short Term Rehab   OT - OK to Discharge Yes   Barthel Index   Feeding 10   Bathing 0   Grooming Score 0   Dressing Score 5   Bladder Score 5   Bowels Score 5   Toilet Use Score 5   Transfers (Bed/Chair) Score 10   Mobility (Level Surface) Score 0   Stairs Score 0   Barthel Index Score 40   Modified Beau Scale   Modified [place] Scale 4     Janie Lou MS, OTR/L

## 2018-12-12 NOTE — PHYSICAL THERAPY NOTE
Physical Therapy Progress Note     12/12/18 0915   Pain Assessment   Pain Assessment No/denies pain   Pain Score No Pain   Hospital Pain Intervention(s) Repositioned; Ambulation/increased activity; Emotional support   Restrictions/Precautions   Braces or Orthoses TLSO   Other Precautions Chair Alarm;Cognitive; Fall Risk;Spinal precautions;Multiple lines  (Alarm active post session )   Subjective   Subjective The pt  states that he has no pain, but he would like to go for a walk  He notes that he is feeling better  Transfers   Sit to Stand 4  Minimal assistance   Additional items Assist x 1; Increased time required   Stand to Sit 5  Supervision   Additional items Increased time required   Ambulation/Elevation   Gait pattern Excessively slow; Short stride; Step to; Inconsistent rajwinder; Shuffling;Decreased foot clearance; Forward Flexion;R Foot drag  (Right foot drag after 50 feet )   Gait Assistance 4  Minimal assist   Additional items Assist x 1;Verbal cues   Assistive Device Rolling walker   Distance 120 feet  Balance   Static Sitting Fair +   Dynamic Sitting Fair   Static Standing Fair -   Ambulatory Poor +   Activity Tolerance   Activity Tolerance Patient tolerated treatment well;Patient limited by fatigue   Assessment   Prognosis Good   Problem List Decreased strength;Decreased range of motion;Decreased endurance; Impaired balance;Decreased mobility; Decreased coordination;Decreased cognition; Impaired judgement;Decreased safety awareness; Impaired vision; Impaired hearing;Orthopedic restrictions;Pain   Assessment The pt  was able to progress his ambulatory distance today, but he continues to require assistance and increased time  He had mild difficulty manuevering around obstacles as he required extended time in order to complete  He fatigued after 50 feet with onset of right foot drag which progressively increased as he fatigued   He will benefit from continued physical therapy in order to safely progress his functional mobility and independence  Barriers to Discharge Inaccessible home environment;Decreased caregiver support   Goals   Patient Goals To regain his strength  STG Expiration Date 12/17/18   Short Term Goal #1 In 10 days pt will complete: 1) Bed mobility skills with MINA2) Functional transfers with minAI 3) Ambulation with RW 25' x2 w/ Delores/ TLSO' without LOB and stable vitals  4) Improve balance grades to F+ w/ AD 6) Improve LE strength grades by 1  7) LE HEP w/ S 8) PT for ongoing pt and family education; DME needs and D/C planning  by Sarah Domínguez PT at 12/07/18 1200   Treatment Day 2   Plan   Treatment/Interventions Functional transfer training;LE strengthening/ROM; Therapeutic exercise; Endurance training;Patient/family training;Bed mobility;Gait training   Progress Progressing toward goals   PT Frequency (3-5x a week )   Recommendation   Recommendation Post acute IP rehab   Equipment Recommended Samson Georgeer, PTA

## 2018-12-12 NOTE — PLAN OF CARE
Problem: OCCUPATIONAL THERAPY ADULT  Goal: Performs self-care activities at highest level of function for planned discharge setting  See evaluation for individualized goals  Treatment Interventions: ADL retraining, Functional transfer training, Endurance training, Cognitive reorientation, Patient/family training, Equipment evaluation/education, Compensatory technique education, Activityengagement          See flowsheet documentation for full assessment, interventions and recommendations  Limitation: Decreased ADL status, Decreased Safe judgement during ADL, Decreased cognition, Decreased endurance, Decreased self-care trans  Prognosis: Good  Assessment: Patient participated in Skilled OT session this date with interventions consisting of ADL re training with the use of correct body mechnaics, Energy Conservation techniques, Work simplification skills , safety awareness and fall prevention techniques, maintaining spinal precautions, therapeutic exercise to: increase functional use of BUEs, increase BUE muscle strength , increase standing tolerance time with unilateral UE support to complete sink level ADLs, increase dynamic sit/ stand balance during functional activity , increase postural control, increase trunk control and increase OOB/ sitting tolerance   Patient agreeable to OT treatment session, upon arrival patient was found seated OOB to Chair  In comparison to previous session, patient with improvements in transfers and functional mobility   Patient requiring verbal cues for safety, verbal cues for correct technique, verbal cues for pacing thru activity steps and cognitive assistance to anticipate next step  Patient continues to be functioning below baseline level, occupational performance remains limited secondary to factors listed above and increased risk for falls and injury  From OT standpoint, recommendation at time of d/c would be Short Term Rehab     Patient to benefit from continued Occupational Therapy treatment while in the hospital to address deficits as defined above and maximize level of functional independence with ADLs and functional mobility        OT Discharge Recommendation: Short Term Rehab  OT - OK to Discharge: Yes      Comments: Isamar Mcmanus MS, OTR/L

## 2018-12-12 NOTE — PLAN OF CARE
Problem: PHYSICAL THERAPY ADULT  Goal: Performs mobility at highest level of function for planned discharge setting  See evaluation for individualized goals  Treatment/Interventions: Functional transfer training, LE strengthening/ROM, Elevations, Therapeutic exercise, Endurance training, Cognitive reorientation, Patient/family training, Equipment eval/education, Bed mobility, Gait training, Compensatory technique education, OT, Spoke to nursing  Equipment Recommended: Wheelchair, Moshe Painter       See flowsheet documentation for full assessment, interventions and recommendations  Outcome: Progressing  Prognosis: Good  Problem List: Decreased strength, Decreased range of motion, Decreased endurance, Impaired balance, Decreased mobility, Decreased coordination, Decreased cognition, Impaired judgement, Decreased safety awareness, Impaired vision, Impaired hearing, Orthopedic restrictions, Pain  Assessment: The pt  was able to progress his ambulatory distance today, but he continues to require assistance and increased time  He had mild difficulty manuevering around obstacles as he required extended time in order to complete  He fatigued after 50 feet with onset of right foot drag which progressively increased as he fatigued  He will benefit from continued physical therapy in order to safely progress his functional mobility and independence  Barriers to Discharge: Inaccessible home environment, Decreased caregiver support     Recommendation: Post acute IP rehab     PT - OK to Discharge: Yes (to rehab once medically cleared)    See flowsheet documentation for full assessment

## 2018-12-13 VITALS
HEART RATE: 60 BPM | HEIGHT: 64 IN | WEIGHT: 188.6 LBS | OXYGEN SATURATION: 96 % | RESPIRATION RATE: 18 BRPM | DIASTOLIC BLOOD PRESSURE: 72 MMHG | TEMPERATURE: 98.4 F | BODY MASS INDEX: 32.2 KG/M2 | SYSTOLIC BLOOD PRESSURE: 158 MMHG

## 2018-12-13 LAB
ANION GAP SERPL CALCULATED.3IONS-SCNC: 7 MMOL/L (ref 4–13)
BUN SERPL-MCNC: 55 MG/DL (ref 5–25)
CALCIUM SERPL-MCNC: 9.2 MG/DL (ref 8.3–10.1)
CHLORIDE SERPL-SCNC: 108 MMOL/L (ref 100–108)
CO2 SERPL-SCNC: 23 MMOL/L (ref 21–32)
CREAT SERPL-MCNC: 1.69 MG/DL (ref 0.6–1.3)
GFR SERPL CREATININE-BSD FRML MDRD: 35 ML/MIN/1.73SQ M
GLUCOSE SERPL-MCNC: 80 MG/DL (ref 65–140)
INR PPP: 2.64 (ref 0.86–1.17)
POTASSIUM SERPL-SCNC: 4.7 MMOL/L (ref 3.5–5.3)
PROTHROMBIN TIME: 28.2 SECONDS (ref 11.8–14.2)
SODIUM SERPL-SCNC: 138 MMOL/L (ref 136–145)

## 2018-12-13 PROCEDURE — 80048 BASIC METABOLIC PNL TOTAL CA: CPT | Performed by: NURSE PRACTITIONER

## 2018-12-13 PROCEDURE — 99238 HOSP IP/OBS DSCHRG MGMT 30/<: CPT | Performed by: PHYSICIAN ASSISTANT

## 2018-12-13 PROCEDURE — 85610 PROTHROMBIN TIME: CPT | Performed by: NURSE PRACTITIONER

## 2018-12-13 RX ORDER — ASPIRIN 81 MG/1
324 TABLET, CHEWABLE ORAL DAILY
Refills: 0
Start: 2018-12-14 | End: 2019-03-07

## 2018-12-13 RX ORDER — FUROSEMIDE 20 MG/1
20 TABLET ORAL DAILY
Refills: 0
Start: 2018-12-15

## 2018-12-13 RX ORDER — LORAZEPAM 0.5 MG/1
0.25 TABLET ORAL 2 TIMES DAILY
Qty: 10 TABLET | Refills: 0 | Status: SHIPPED | OUTPATIENT
Start: 2018-12-13 | End: 2019-03-06

## 2018-12-13 RX ADMIN — RIVASTIGMINE 1 PATCH: 4.6 PATCH TRANSDERMAL at 08:48

## 2018-12-13 RX ADMIN — LIDOCAINE 1 PATCH: 50 PATCH CUTANEOUS at 08:48

## 2018-12-13 RX ADMIN — SERTRALINE HYDROCHLORIDE 50 MG: 50 TABLET ORAL at 08:48

## 2018-12-13 RX ADMIN — ASPIRIN 81 MG 324 MG: 81 TABLET ORAL at 08:48

## 2018-12-13 RX ADMIN — DOCUSATE SODIUM 100 MG: 100 CAPSULE, LIQUID FILLED ORAL at 08:48

## 2018-12-13 RX ADMIN — ACETAMINOPHEN 975 MG: 325 TABLET, FILM COATED ORAL at 05:14

## 2018-12-13 RX ADMIN — VITAMIN D, TAB 1000IU (100/BT) 1000 UNITS: 25 TAB at 08:48

## 2018-12-13 RX ADMIN — MEMANTINE 10 MG: 5 TABLET ORAL at 08:47

## 2018-12-13 RX ADMIN — LORAZEPAM 0.25 MG: 0.5 TABLET ORAL at 08:47

## 2018-12-13 NOTE — DISCHARGE SUMMARY
Discharge Summary - Luis Zambrano 80 y o  male MRN: [id number]    [facility] Encounter: [id number]    Admission Date:   Admission Orders     Ordered        12/05/18 2027  Inpatient Admission  Once               Admitting Diagnosis: Ambulatory dysfunction [R26 2]  Closed fracture of lumbar vertebral body ([address] ) [S32 009A]  Unspecified multiple injuries, initial encounter [T07  XXXA]    HPI: Per resident Christin Rodriguez DO, "Luis Zambrano is a 80 y o  male who presents as a transfer from [facility]  Patient is poor historian and has hx of dementia  Is on coumadin for apparent atrial fibrillation  He has had multiple falls recently reportedly but he denies this  He is from a nursing facility; his daughter who is POA confirms baseline mental status  He denies any CP, abd pain, SOB but states pain when he moves  He has healing ecchymosis along flank and perineum, only abrasion is on forearm  Apparently was not able to ambulate today that prompted bringing to the ER  He can flex both legs and states sensation both legs the same and intact  Denies issues with urination      Of note I did speak with daughter who is POA on the phone and she states given mental status and age would likely not want any surgical intervention "    Procedures Performed: No orders of the defined types were placed in this encounter  Summary of Hospital Course:   Patient was admitted to the trauma service with compression fracture T10 and comminuted fractures of L1-2  Neurosurgery was consulted and recommended TLSO brace  Patient was found to have elevated creatinine and was administered IV fluids with good improvement  He was recommended for acute rehab by PT and OT  He was discharged to [facility] 12/13/18    Significant Findings, Care, Treatment and Services Provided:     Ct Chest Abdomen Pelvis Wo Contrast    Result Date: 12/5/2018  Impression: Chest: No acute cardiopulmonary disease   Abdomen: Acute comminuted fracture at L1, L2 and L3 which appears to extend into the posterior elements concerning for unstable fracture  High-grade compression fracture at T10 with anterior wedging suggesting acute fracture  No acute inflammatory changes  Pelvis: Sigmoid colon diverticulosis without evidence of diverticulitis    Critical findings discussed with and acknowledged by Dr Ankur Bradshaw at 5:31 PM on 12/5/2018  Signed by Meng Martínez DO    Xr Spine Thoracolumbar 2 Vw    Result Date: 12/7/2018  Impression: 1  Stable severe compression fracture T10  2   Congenital block vertebra L1-L3  The previously identified fracture of the L2 vertebral body is not seen on plain radiographs  Workstation performed: [id number]     Ct Head Wo Contrast    Result Date: 12/6/2018  Impression: No acute intracranial process or significant interval change  No skull fracture  Chronic microangiopathy  Workstation performed: [id number]     Ct Cervical Spine Without Contrast    Result Date: 12/5/2018  Impression: Impression: Spondylotic changes and facet arthrosis  No findings of fracture or traumatic listhesis  Varying degrees of chronic mild central stenosis and and mild to moderately severe foraminal narrowing as above  Signed by Betty Guevara MD      Complications: none    Discharge Diagnosis:   PELON  Ambulatory dysfunction  Compression fracture  T10  Comminuted fractures L1-3    Resolved Problems  Date Reviewed: 12/13/2018    None          Condition at Discharge: good         Discharge instructions/Information to patient and family:   See after visit summary for information provided to patient and family  Provisions for Follow-Up Care:  See after visit summary for information related to follow-up care and any pertinent home health orders  PCP: HAILE Berman    Disposition: [facility]    Planned Readmission: No    Discharge Statement   I spent 23 minutes discharging the patient   This time was spent on the day of discharge  I had direct contact with the patient on the day of discharge  Additional documentation is required if more than 30 minutes were spent on discharge  Discharge Medications:  See after visit summary for reconciled discharge medications provided to patient and family

## 2018-12-13 NOTE — DISCHARGE INSTRUCTIONS
Vertebral Compression Fracture   WHAT YOU NEED TO KNOW:   What is a vertebral compression fracture? A vertebral compression fracture (VCF) is a break in a part of the vertebra  Vertebrae are the round, strong bones that form your spine  VCFs most often occur in the thoracic (middle) and lumbar (lower) areas of your spine  Fractures may be mild to severe  What causes a VCF?   · Osteoporosis  is a condition that causes your bones to become weak and brittle  People with weak bones can get fractures by bending forward, standing from a seated position, sneezing, or strong coughing  · Injuries  to the spine can occur during a vehicle accident, a fall, or while playing sports  · Diseases  of the spine, such as cancer, infection, and avascular necrosis, can weaken your bones and cause fractures  What are the signs and symptoms of a VCF? You may not have any signs and symptoms with a mild VCF, or you may have any of the following:  · Sudden, severe, and sharp back pain    · Back pain that gets worse when you stand or walk    · Muscle spasms in your back     · Problems urinating or having bowel movements    · Sudden weakness in your arms or legs  How is a VCF diagnosed? Your healthcare provider will ask you about injuries and diseases you have had in the past  Your healthcare provider will do a physical exam, and check your spine  You may need any of the following tests:  · X-rays, a CT scan, or MRI  of your spine may be taken  You may be given a dye before the pictures are taken to help healthcare providers see the pictures better  Tell the healthcare provider if you have ever had an allergic reaction to contrast dye  You should not enter the MRI room with anything metal  Metal can cause serious injury  Tell the healthcare provider if you have any metal in or on your body  · A bone scan  of your spine may be done  The pictures may show broken bones or other problems in the spine, such as an infection    How is a VCF treated? If you have a mild fracture, you may need to rest in bed for a short time  You may need to wear a back brace for 8 to 12 weeks  A brace may decrease your pain, and help your vertebrae heal  You may need to use a cane or walker  You may also need any of the following:  · Medicines:     ¨ NSAIDs , such as ibuprofen, help decrease swelling, pain, and fever  This medicine is available with or without a doctor's order  NSAIDs can cause stomach bleeding or kidney problems in certain people  If you take blood thinner medicine, always ask if NSAIDs are safe for you  Always read the medicine label and follow directions  Do not give these medicines to children under 10months of age without direction from your child's healthcare provider  ¨ Acetaminophen  decreases pain and fever  It is available without a doctor's order  Ask how much to take and how often to take it  Follow directions  Acetaminophen can cause liver damage if not taken correctly  ¨ Prescription pain medicine  may be given  Ask your healthcare provider how to take this medicine safely  ¨ Bisphosphonates and calcitonin  may be recommended to help your bones get stronger  They can decrease the pain of a VCF caused by osteoporosis, and decrease your risk for another fracture  · Physical and occupational therapy  may be recommended  A physical therapist teaches you exercises to help improve movement and strength, and to decrease pain  An occupational therapist teaches you skills to help with your daily activities  · Surgery  may be needed if your pain, weakness, or numbness does not go away after other treatments  Surgery may make your spine more stable, and help decrease pressure on your spinal nerves caused by the fracture  ¨ Vertebroplasty  is a procedure used to place bone cement into the fractured vertebrae  ¨ Kyphoplasty  is a procedure that uses a balloon to make a space in the fractured vertebrae   Bone cement is then put inside the space made by the balloon  ¨ Open surgery  returns bones to the right place and holds them together with wires, pins, plates, or screws  How can I manage pain while I sleep? · Do not sleep in a waterbed  Waterbeds do not provide good back support  · Sleep on a firm mattress  You may also put a ½ to 1-inch piece of plywood between the mattress and box spring  · Sleep on your back with a pillow under your knees  This will decrease pressure on your back  You may also sleep on your side with 1 or both of your knees bent and a pillow between them  It may also be helpful to sleep on your stomach with a pillow under you at waist level  When should I contact my healthcare provider? · You are not hungry, and you are losing weight  · You cannot sleep or rest because of back pain  · You have pain or swelling in your back that is getting worse, or does not go away  · You have questions or concerns about your condition or care  When should I seek immediate care or call 911? · You feel lightheaded, short of breath, and have chest pain  · You cough up blood  · Your arm or leg feels warm, tender, and painful  It may look swollen and red  · You have new problems urinating or having bowel movements  · You have severe pain in your back after falling, bending forward, sneezing, or coughing strongly  · You suddenly cannot feel your legs  · You suddenly have trouble moving your arms or legs  CARE AGREEMENT:   You have the right to help plan your care  Learn about your health condition and how it may be treated  Discuss treatment options with your caregivers to decide what care you want to receive  You always have the right to refuse treatment  The above information is an  only  It is not intended as medical advice for individual conditions or treatments   Talk to your doctor, nurse or pharmacist before following any medical regimen to see if it is safe and effective for you  © 2017 [facility] Information is for End User's use only and may not be sold, redistributed or otherwise used for commercial purposes  All illustrations and images included in CareNotes® are the copyrighted property of A D A M [employer] Inc  or [employer]

## 2018-12-13 NOTE — PROGRESS NOTES
Progress Note - Devora Nguyen 12/5/1927, 80 y o  male MRN: [id number]    [facility]: [id number]    Primary Care Provider: Dimitry Johnson   Date and time admitted to hospital: 12/5/2018  7:25 PM        Elevated serum creatinine   Assessment & Plan    - Creatinine improved this morning at 1 69     Dementia   Assessment & Plan    - Appreciate geriatric medicine recs     Ambulatory dysfunction   Assessment & Plan    - PT/OT   - Rehab placement     Compression fracture of body of thoracic vertebra (HCC)   Assessment & Plan    - TLSO brace   - Upright films stable (12/6)  - PT/OT  - Pain control  - Rehab placement     Coagulopathy (HCC)   Assessment & Plan    - continue Coumadin for h/o Afib  - INR 2 64        Traumatic ecchymosis of flank   Assessment & Plan    - On coumadin, per CT scan no evidence of hematoma       * Closed fracture of lumbar vertebral body (HCC)   Assessment & Plan    - TLSO brace   - Upright films stable (12/6)  - PT/OT  - Pain control  - Rehab palcement         DISPOSITION/ PLAN:  Discharge today    Chief Complaint: None    Subjective: Doing well with no complaints    Objective:     Meds/Allergies     Current Facility-Administered Medications:     acetaminophen (TYLENOL) tablet 975 mg, 975 mg, Oral, Q8H [facility], Mia Guerrero PA-C, 975 mg at 12/13/18 8596    aspirin chewable tablet 324 mg, 324 mg, Oral, Daily, Dev R Susan, DO, 324 mg at 12/13/18 0848    atorvastatin (LIPITOR) tablet 20 mg, 20 mg, Oral, Daily With Dinner, Dev R Susan, DO, 20 mg at 12/12/18 1722    cholecalciferol (VITAMIN D3) tablet 1,000 Units, 1,000 Units, Oral, Daily, Nahed Rabenold, DO, 1,000 Units at 12/13/18 0848    docusate sodium (COLACE) capsule 100 mg, 100 mg, Oral, BID, Dev R Susan, DO, 100 mg at 12/13/18 0848    lidocaine (LIDODERM) 5 % patch 1 patch, 1 patch, Topical, Daily, Dev R Susan, DO, 1 patch at 12/13/18 0848    LORazepam (ATIVAN) tablet 0 25 mg, 0 25 mg, Oral, BID, Shannan Celis PA-C, 0 25 mg at 12/13/18 0847    memantine (NAMENDA) tablet 10 mg, 10 mg, Oral, Daily, Shannan Lalita PA-C, 10 mg at 12/13/18 0847    oxyCODONE (ROXICODONE) IR tablet 2 5 mg, 2 5 mg, Oral, Q4H PRN, Marleni BERTHA Antunez, VIOLETNP    rivastigmine (EXELON) 4 6 mg/24 hr TD 24 hr patch 1 patch, 1 patch, Transdermal, Daily, Dev R Susan, DO, 1 patch at 12/13/18 0848    senna (SENOKOT) tablet 8 6 mg, 1 tablet, Oral, HS, COURTNEY Vazquez-C, 8 6 mg at 12/12/18 2124    sertraline (ZOLOFT) tablet 50 mg, 50 mg, Oral, Daily, Dev R Susan, DO, 50 mg at 12/13/18 0848    warfarin (COUMADIN) tablet 4 mg, 4 mg, Oral, Daily (warfarin), Ludger Kaiser Susan, DO, 4 mg at 12/12/18 1722    Vitals: Blood pressure 158/72, pulse 60, temperature 98 4 °F (36 9 °C), temperature source Oral, resp  rate 18, height 5' 4" (1 626 m), weight 85 5 kg (188 lb 9 6 oz), SpO2 96 %  Body mass index is 32 37 kg/m²   SpO2: SpO2: 96 %    ABG: No results found for: PHART, YXA4NIB, PO2ART, GNC5BSF, I5HZVJVM, BEART, SOURCE      Intake/Output Summary (Last 24 hours) at 12/13/18 0919  Last data filed at 12/13/18 0141   Gross per 24 hour   Intake              480 ml   Output               44 ml   Net              436 ml       Invasive Devices     Peripheral Intravenous Line            Peripheral IV 12/11/18 Left Forearm 1 day                        Nutrition/GI Proph/Bowel Reg: Colace, Senokot    Pain management regimen: Tylenol, Oxycodone    VTE Prophylaxis: Coumadin    Physical Exam:   Constitution: patient appears comfortable, no acute distress  HEENT: normocephalic, atraumatic, PERRLA, clear speech  CV: regular rate and rhythm, no edema, cap refill intact b/l LE's  Pulm: CTA, no wheezes, rhonchi or crackles, unlabored, equal bilaterally  Abd: soft, nontender, nondistended, active bowel sounds  Musc: moves all extremities, equal strength, no calf tenderness  Neuro: A&O, no focal deficits  Skin: no rash or breakdown, warm      Lab Results: BMP/CMP:   Lab Results   Component Value Date    SODIUM 138 12/13/2018    K 4 7 12/13/2018     12/13/2018    CO2 23 12/13/2018    BUN 55 (H) 12/13/2018    CREATININE 1 69 (H) 12/13/2018    CALCIUM 9 2 12/13/2018    EGFR 35 12/13/2018    and CBC: No results found for: WBC, HGB, HCT, MCV, PLT, ADJUSTEDWBC, MCH, MCHC, RDW, MPV, NRBC  Imaging/EKG Studies: Results: I have personally reviewed pertinent reports          Nurse provider rounds completed with Ana Cohen

## 2018-12-13 NOTE — SOCIAL WORK
Pt cleared for d/c to [facility]   Pt will d/c there today @1400 via [facility] as they're in network with [facility]

## 2019-01-26 ENCOUNTER — HOSPITAL ENCOUNTER (EMERGENCY)
Facility: HOSPITAL | Age: 84
Discharge: HOME | End: 2019-01-27
Attending: EMERGENCY MEDICINE
Payer: COMMERCIAL

## 2019-01-26 ENCOUNTER — APPOINTMENT (EMERGENCY)
Dept: CT IMAGING | Facility: HOSPITAL | Age: 84
End: 2019-01-26
Payer: COMMERCIAL

## 2019-01-26 VITALS
HEIGHT: 64 IN | OXYGEN SATURATION: 96 % | HEART RATE: 67 BPM | BODY MASS INDEX: 32.18 KG/M2 | DIASTOLIC BLOOD PRESSURE: 74 MMHG | RESPIRATION RATE: 17 BRPM | SYSTOLIC BLOOD PRESSURE: 159 MMHG | WEIGHT: 188.49 LBS | TEMPERATURE: 97.8 F

## 2019-01-26 DIAGNOSIS — S51.009A: ICD-10-CM
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
## 2019-01-26 PROCEDURE — 99284 EMERGENCY DEPT VISIT MOD MDM: CPT

## 2019-01-26 PROCEDURE — 70450 CT HEAD/BRAIN W/O DYE: CPT

## 2019-01-27 RX ORDER — DIPHENHYDRAMINE HYDROCHLORIDE 50 MG/ML
25 INJECTION INTRAMUSCULAR; INTRAVENOUS EVERY 6 HOURS PRN
Status: DISCONTINUED | OUTPATIENT
Start: 2019-01-27 | End: 2019-01-27

## 2019-01-27 NOTE — ED NOTES
[facility] made aware patient is for discharge  Will try to arrange for patient to be transferred back to [facility] ASAP Lynnae Carrel, RN  01/27/19 0150

## 2019-01-27 NOTE — ED PROVIDER NOTES
History  Chief Complaint   Patient presents with    Fall     Patient fell OOB at [facility] no LOC per ambulance crew who received report from staff  Patient has baseline dementia, oriented to self only  Denies any pain or discomfort  Able to move all extremities  Patient unsure if hit head  Is on aspirin  Patient is a 31-year-old female resident of a skilled nursing facility he presents the emergency department for evaluation of fall the patient has a history of dementia denies any complaints or pain  Does have some skin tears on his arm that are dressed and bandaged  Patient is on Coumadin he denies any hit on head or loss of consciousness  History provided by:  Patient and EMS personnel  Fall   Mechanism of injury: fall    Arrived directly from scene: yes    Prior to arrival data:     Patient ambulatory at scene: yes      Responsiveness at scene:  Alert    Orientation at scene:  Person  Associated symptoms: no abdominal pain, no chest pain, no headaches, no nausea and no vomiting        Prior to Admission Medications   Prescriptions Last Dose Informant Patient Reported? Taking?    LORazepam (ATIVAN) 0 5 mg tablet   No No   Sig: Take 0 5 tablets (0 25 mg total) by mouth 2 (two) times a day for 10 days   Lidocaine (ASPERCREME LIDOCAINE) 4 % PTCH   Yes No   Sig: Apply topically   Memantine HCl ER (NAMENDA XR) 28 MG CP24   Yes No   Sig: Take 28 mg by mouth daily   acetaminophen (TYLENOL) 325 mg tablet   Yes No   Sig: Take 650 mg by mouth 2 (two) times a day     aspirin 81 mg chewable tablet   No No   Sig: Chew 4 tablets (324 mg total) daily   atorvastatin (LIPITOR) 20 mg tablet   Yes No   Sig: Take 20 mg by mouth daily at bedtime     cholecalciferol 1000 units tablet   No No   Sig: Take 1 tablet (1,000 Units total) by mouth daily   dextromethorphan-guaiFENesin (ROBITUSSIN DM)  mg/5 mL syrup   Yes No   Sig: Take 5 mL by mouth every 6 (six) hours as needed for cough   furosemide (LASIX) 20 mg tablet No No   Sig: Take 1 tablet (20 mg total) by mouth daily   lisinopril (ZESTRIL) 5 mg tablet   Yes No   Sig: Take 5 mg by mouth daily     neomycin-bacitracin-polymyxin (NEOSPORIN) 5-400-5,000 ointment   Yes No   Sig: Apply topically 2 (two) times a day as needed (Skin tears)   rivastigmine (EXELON) 4 6 mg/24 hr TD 24 hr patch   Yes No   Sig: Place 1 patch on the skin   sertraline (ZOLOFT) 50 mg tablet   Yes No   Sig: Take 50 mg by mouth daily   warfarin (COUMADIN) 4 mg tablet   Yes No   Sig: Take 4 mg by mouth daily at bedtime        Facility-Administered Medications: None       Past Medical History:   Diagnosis Date    Anxiety     Dementia     DJD (degenerative joint disease)     Hyperlipidemia     Hypertension     Psychiatric disorder     anxiety depresion        History reviewed  No pertinent surgical history  History reviewed  No pertinent family history  I have reviewed and agree with the history as documented  Social History   Substance Use Topics    Smoking status: Never Smoker    Smokeless tobacco: Never Used    Alcohol use No        Review of Systems   Constitutional: Negative for activity change, appetite change, chills, fatigue and fever  HENT: Negative for congestion, ear pain, rhinorrhea and sore throat  Eyes: Negative for discharge, redness and visual disturbance  Respiratory: Negative for cough, chest tightness, shortness of breath and wheezing  Cardiovascular: Negative for chest pain and palpitations  Gastrointestinal: Negative for abdominal pain, constipation, diarrhea, nausea and vomiting  Endocrine: Negative for polydipsia and polyuria  Genitourinary: Negative for difficulty urinating, dysuria, frequency, hematuria and urgency  Musculoskeletal: Negative for arthralgias and myalgias  Skin: Negative for color change, pallor and rash  Neurological: Negative for dizziness, weakness, light-headedness, numbness and headaches  Hematological: Negative for adenopathy  Does not bruise/bleed easily  All other systems reviewed and are negative  Physical Exam  Physical Exam   Constitutional: He appears well-developed and well-nourished  HENT:   Head: Normocephalic and atraumatic  Right Ear: External ear normal    Left Ear: External ear normal    Nose: Nose normal    Mouth/Throat: Oropharynx is clear and moist    Eyes: Pupils are equal, round, and reactive to light  Conjunctivae and EOM are normal    Neck: Normal range of motion  Neck supple  Cardiovascular: Normal rate, regular rhythm, normal heart sounds and intact distal pulses  Pulmonary/Chest: Effort normal and breath sounds normal  No respiratory distress  He has no wheezes  He has no rales  He exhibits no tenderness  Abdominal: Soft  Bowel sounds are normal  He exhibits no distension  There is no tenderness  There is no guarding  Musculoskeletal: Normal range of motion  Neurological: He is alert  No cranial nerve deficit or sensory deficit  Skin: Skin is warm and dry  Skin tears on bilateral forearm and elbow region   Psychiatric: He has a normal mood and affect  Nursing note and vitals reviewed        Vital Signs  ED Triage Vitals   Temperature Pulse Respirations Blood Pressure SpO2   01/26/19 2246 01/26/19 2245 01/26/19 2245 01/26/19 2246 01/26/19 2245   97 8 °F (36 6 °C) 61 18 159/74 97 %      Temp Source Heart Rate Source Patient Position - Orthostatic VS BP Location FiO2 (%)   01/26/19 2246 01/26/19 2246 01/26/19 2246 01/26/19 2246 --   Temporal Monitor Lying Left arm       Pain Score       01/26/19 2246       No Pain           Vitals:    01/26/19 2245 01/26/19 2246   BP:  159/74   Pulse: 61 67   Patient Position - Orthostatic VS:  Lying       Visual Acuity  Visual Acuity      Most Recent Value   L Pupil Size (mm)  4   R Pupil Size (mm)  4          ED Medications  Medications - No data to display    Diagnostic Studies  Results Reviewed     None                 CT head without contrast   Final Result by Bailey Nelson DO (01/26 3781)      No acute intracranial abnormality  Workstation performed: [id number]                    Procedures  Procedures       Phone Contacts  ED Phone Contact    ED Course                               MDM  Number of Diagnoses or Management Options  Avulsion of skin of elbow: new and does not require workup  Fall, initial encounter: new and requires workup  Diagnosis management comments: Patient is well-appearing without any complaints in the emergency department no focal neurologic deficits does have baseline dementia is oriented to person and wishes to return home to nursing facility where he resides  CT head shows no acute traumatic injuries patient and nursing staff advised supportive care and fall prevention measures and local skin tears forearms and elbows and follow up primary physician for re-evaluation return precautions and anticipatory guidance discussed  Amount and/or Complexity of Data Reviewed  Tests in the radiology section of CPT®: ordered and reviewed  Independent visualization of images, tracings, or specimens: yes    Risk of Complications, Morbidity, and/or Mortality  Presenting problems: moderate  Management options: moderate    Patient Progress  Patient progress: stable    CritCare Time    Disposition  Final diagnoses:   Fall, initial encounter   Avulsion of skin of elbow - Bilateral     Time reflects when diagnosis was documented in both MDM as applicable and the Disposition within this note     Time User Action Codes Description Comment    1/26/2019 11:27 PM Felipe Garza Stacks  SDHA] Fall, initial encounter     1/26/2019 11:27 PM China Martínez Add [S51 009A] Avulsion of skin of elbow     1/26/2019 11:27 PM Tomas Martínez Modify [S51 009A] Avulsion of skin of elbow Bilateral      ED Disposition     ED Disposition Condition Comment    Discharge  Margy Tierney discharge to home/self care      Condition at discharge: Stable Follow-up Information     Follow up With Specialties Details Why [address], [address] Nils Phillips, Nurse Practitioner Schedule an appointment as soon as possible for a visit in 2 days  [address]  [phone]            Patient's Medications   Discharge Prescriptions    No medications on file     No discharge procedures on file      ED Provider  Electronically Signed by           Candida Romero DO  01/27/19 9476

## 2019-01-27 NOTE — DISCHARGE INSTRUCTIONS
Fall Prevention   WHAT YOU NEED TO KNOW:   Fall prevention includes ways to make your home and other areas safer  It also includes ways you can move more carefully to prevent a fall  Health conditions that cause changes in your blood pressure, vision, or muscle strength and coordination may increase your risk for falls  Medicines may also increase your risk for falls if they make you dizzy, weak, or sleepy  DISCHARGE INSTRUCTIONS:   Call 911 or have someone else call if:   · You have fallen and are unconscious  · You have fallen and cannot move part of your body  Contact your healthcare provider if:   · You have fallen and have pain or a headache  · You have questions or concerns about your condition or care  Fall prevention tips:   · Stand or sit up slowly  This may help you keep your balance and prevent falls  · Use assistive devices as directed  Your healthcare provider may suggest that you use a cane or walker to help you keep your balance  You may need to have grab bars put in your bathroom near the toilet or in the shower  · Wear shoes that fit well and have soles that   Wear shoes both inside and outside  Use slippers with good   Do not wear shoes with high heels  · Wear a personal alarm  This is a device that allows you to call 911 if you fall and need help  Ask your healthcare provider for more information  · Stay active  Exercise can help strengthen your muscles and improve your balance  Your healthcare provider may recommend water aerobics or walking  He or she may also recommend physical therapy to improve your coordination  Never start an exercise program without talking to your healthcare provider first      · Manage your medical conditions  Keep all appointments with your healthcare providers  Visit your eye doctor as directed  Home safety tips:   · Add items to prevent falls in the bathroom    Put nonslip strips on your bath or shower floor to prevent you from slipping  Use a bath mat if you do not have carpet in the bathroom  This will prevent you from falling when you step out of the bath or shower  Use a shower seat so you do not need to stand while you shower  Sit on the toilet or a chair in your bathroom to dry yourself and put on clothing  This will prevent you from losing your balance from drying or dressing yourself while you are standing  · Keep paths clear  Remove books, shoes, and other objects from walkways and stairs  Place cords for telephones and lamps out of the way so that you do not need to walk over them  Tape them down if you cannot move them  Remove small rugs  If you cannot remove a rug, secure it with double-sided tape  This will prevent you from tripping  · Install bright lights in your home  Use night lights to help light paths to the bathroom or kitchen  Always turn on the light before you start walking  · Keep items you use often on shelves within reach  Do not use a step stool to help you reach an item  · Paint or place reflective tape on the edges of your stairs  This will help you see the stairs better  Follow up with your healthcare provider as directed:  Write down your questions so you remember to ask them during your visits  © 2017 [address] Information is for End User's use only and may not be sold, redistributed or otherwise used for commercial purposes  All illustrations and images included in CareNotes® are the copyrighted property of A [employer]  or [employer]  The above information is an  only  It is not intended as medical advice for individual conditions or treatments  Talk to your doctor, nurse or pharmacist before following any medical regimen to see if it is safe and effective for you

## 2019-02-11 ENCOUNTER — HOSPITAL ENCOUNTER (EMERGENCY)
Facility: HOSPITAL | Age: 84
Discharge: HOME | End: 2019-02-11
Attending: EMERGENCY MEDICINE | Admitting: EMERGENCY MEDICINE
Payer: COMMERCIAL

## 2019-02-11 VITALS
TEMPERATURE: 97.8 F | HEART RATE: 51 BPM | WEIGHT: 188.49 LBS | RESPIRATION RATE: 18 BRPM | SYSTOLIC BLOOD PRESSURE: 177 MMHG | DIASTOLIC BLOOD PRESSURE: 44 MMHG | BODY MASS INDEX: 32.18 KG/M2 | OXYGEN SATURATION: 95 % | HEIGHT: 64 IN

## 2019-02-11 DIAGNOSIS — I82.409 DVT (DEEP VENOUS THROMBOSIS) (HCC): Primary | ICD-10-CM

## 2019-02-11 LAB
APTT PPP: 36 SECONDS (ref 26–38)
BASOPHILS # BLD AUTO: 0 THOUSANDS/ΜL (ref 0–0.1)
BASOPHILS NFR BLD AUTO: 1 % (ref 0–1)
DEPRECATED D DIMER PPP: 810 NG/ML (FEU)
EOSINOPHIL # BLD AUTO: 0.2 THOUSAND/ΜL (ref 0–0.61)
EOSINOPHIL NFR BLD AUTO: 2 % (ref 0–6)
ERYTHROCYTE [DISTWIDTH] IN BLOOD BY AUTOMATED COUNT: 16.4 % (ref 11.6–15.1)
HCT VFR BLD AUTO: 30.4 % (ref 42–52)
HGB BLD-MCNC: 9.6 G/DL (ref 12–17)
INR PPP: 1.96 (ref 0.9–1.5)
LYMPHOCYTES # BLD AUTO: 1.2 THOUSANDS/ΜL (ref 0.6–4.47)
LYMPHOCYTES NFR BLD AUTO: 15 % (ref 14–44)
MCH RBC QN AUTO: 31 PG (ref 26.8–34.3)
MCHC RBC AUTO-ENTMCNC: 31.7 G/DL (ref 31.4–37.4)
MCV RBC AUTO: 98 FL (ref 82–98)
MONOCYTES # BLD AUTO: 0.9 THOUSAND/ΜL (ref 0.17–1.22)
MONOCYTES NFR BLD AUTO: 12 % (ref 4–12)
NEUTROPHILS # BLD AUTO: 5.4 THOUSANDS/ΜL (ref 1.85–7.62)
NEUTS SEG NFR BLD AUTO: 70 % (ref 43–75)
NRBC BLD AUTO-RTO: 0 /100 WBCS
PLATELET # BLD AUTO: 236 THOUSANDS/UL (ref 149–390)
PMV BLD AUTO: 9 FL (ref 8.9–12.7)
PROTHROMBIN TIME: 22.7 SECONDS (ref 10.2–13)
RBC # BLD AUTO: 3.11 MILLION/UL (ref 3.88–5.62)
WBC # BLD AUTO: 7.7 THOUSAND/UL (ref 4.31–10.16)

## 2019-02-11 PROCEDURE — 85610 PROTHROMBIN TIME: CPT | Performed by: EMERGENCY MEDICINE

## 2019-02-11 PROCEDURE — 85730 THROMBOPLASTIN TIME PARTIAL: CPT | Performed by: EMERGENCY MEDICINE

## 2019-02-11 PROCEDURE — 36415 COLL VENOUS BLD VENIPUNCTURE: CPT | Performed by: EMERGENCY MEDICINE

## 2019-02-11 PROCEDURE — 85379 FIBRIN DEGRADATION QUANT: CPT | Performed by: EMERGENCY MEDICINE

## 2019-02-11 PROCEDURE — 99283 EMERGENCY DEPT VISIT LOW MDM: CPT

## 2019-02-11 PROCEDURE — 85025 COMPLETE CBC W/AUTO DIFF WBC: CPT | Performed by: EMERGENCY MEDICINE

## 2019-02-11 NOTE — ED PROVIDER NOTES
History  Chief Complaint   Patient presents with    Leg Pain     PT has no complaints; staff states that his right leg is hard and red     Patient is brought to the emergency department by EMS with complaint of swelling in the right calf  Staff at patient's nursing home cannot relate when this began or how long it has been present  Patient has no complaint of pain swelling or any other complaints  EMS reports that patient was apprised with a arrive to bring the hospital since he has no complaints  History provided by:  Patient and EMS personnel      Prior to Admission Medications   Prescriptions Last Dose Informant Patient Reported? Taking?    LORazepam (ATIVAN) 0 5 mg tablet   No No   Sig: Take 0 5 tablets (0 25 mg total) by mouth 2 (two) times a day for 10 days   Lidocaine (ASPERCREME LIDOCAINE) 4 % PTCH   Yes No   Sig: Apply topically   Memantine HCl ER (NAMENDA XR) 28 MG CP24   Yes No   Sig: Take 28 mg by mouth daily   acetaminophen (TYLENOL) 325 mg tablet   Yes No   Sig: Take 650 mg by mouth 2 (two) times a day     aspirin 81 mg chewable tablet   No No   Sig: Chew 4 tablets (324 mg total) daily   atorvastatin (LIPITOR) 20 mg tablet   Yes No   Sig: Take 20 mg by mouth daily at bedtime     cholecalciferol 1000 units tablet   No No   Sig: Take 1 tablet (1,000 Units total) by mouth daily   dextromethorphan-guaiFENesin (ROBITUSSIN DM)  mg/5 mL syrup   Yes No   Sig: Take 5 mL by mouth every 6 (six) hours as needed for cough   furosemide (LASIX) 20 mg tablet   No No   Sig: Take 1 tablet (20 mg total) by mouth daily   lisinopril (ZESTRIL) 5 mg tablet   Yes No   Sig: Take 5 mg by mouth daily     neomycin-bacitracin-polymyxin (NEOSPORIN) 5-400-5,000 ointment   Yes No   Sig: Apply topically 2 (two) times a day as needed (Skin tears)   rivastigmine (EXELON) 4 6 mg/24 hr TD 24 hr patch   Yes No   Sig: Place 1 patch on the skin   sertraline (ZOLOFT) 50 mg tablet   Yes No   Sig: Take 50 mg by mouth daily warfarin (COUMADIN) 4 mg tablet   Yes No   Sig: Take 4 mg by mouth daily at bedtime        Facility-Administered Medications: None       Past Medical History:   Diagnosis Date    Anxiety     Dementia     DJD (degenerative joint disease)     Hyperlipidemia     Hypertension     Psychiatric disorder     anxiety depresion        History reviewed  No pertinent surgical history  History reviewed  No pertinent family history  I have reviewed and agree with the history as documented  Social History     Tobacco Use    Smoking status: Never Smoker    Smokeless tobacco: Never Used   Substance Use Topics    Alcohol use: No    Drug use: No        Review of Systems   Constitutional: Negative for chills and fever  HENT: Negative for rhinorrhea and sore throat  Eyes: Negative for visual disturbance  Respiratory: Negative for cough and shortness of breath  Cardiovascular: Negative for chest pain and leg swelling  Gastrointestinal: Negative for abdominal pain, diarrhea, nausea and vomiting  Genitourinary: Negative for dysuria  Musculoskeletal: Negative for back pain and myalgias  Right calf swelling   Skin: Negative for rash  Neurological: Negative for dizziness and headaches  Psychiatric/Behavioral: Negative for confusion  All other systems reviewed and are negative  Physical Exam  Physical Exam   Constitutional: He is oriented to person, place, and time  He appears well-developed and well-nourished  HENT:   Head: Normocephalic and atraumatic  Right Ear: External ear normal    Left Ear: External ear normal    Nose: Nose normal    Eyes: Pupils are equal, round, and reactive to light  Conjunctivae and EOM are normal  No scleral icterus  Neck: Normal range of motion  Neck supple  No JVD present  No tracheal deviation present  Cardiovascular: Normal rate, regular rhythm and normal heart sounds  No murmur heard    Pulmonary/Chest: Effort normal and breath sounds normal  No respiratory distress  He has no wheezes  He has no rales  Musculoskeletal: Normal range of motion  He exhibits edema  He exhibits no tenderness or deformity  Patient's right calf is slightly edematous compared to the left with no cord and no erythema  There is no Gilmar sign with full range of motion in the right ankle and right knee without pain in the calf  Right foot capillary refill normal    Neurological: He is alert and oriented to person, place, and time  No cranial nerve deficit or sensory deficit  He exhibits normal muscle tone  5/5 motor, nl sens   Skin: Skin is warm and dry  Psychiatric: He has a normal mood and affect  His behavior is normal    Nursing note and vitals reviewed        Vital Signs  ED Triage Vitals [02/11/19 1640]   Temperature Pulse Respirations Blood Pressure SpO2   97 8 °F (36 6 °C) (!) 51 18 (!) 177/44 95 %      Temp Source Heart Rate Source Patient Position - Orthostatic VS BP Location FiO2 (%)   Tympanic Monitor Lying Left arm --      Pain Score       No Pain           Vitals:    02/11/19 1640   BP: (!) 177/44   Pulse: (!) 51   Patient Position - Orthostatic VS: Lying       Visual Acuity      ED Medications  Medications - No data to display    Diagnostic Studies  Results Reviewed     Procedure Component Value Units Date/Time    D-Dimer [[id number]]  (Abnormal) Collected:  02/11/19 1651    Lab Status:  Final result Specimen:  Blood from Hand, Left Updated:  02/11/19 1711     D-Dimer, Quant 810 ng/ml (FEU)     Protime-INR [[id number]]  (Abnormal) Collected:  02/11/19 1651    Lab Status:  Final result Specimen:  Blood from Hand, Left Updated:  02/11/19 1711     Protime 22 7 seconds      INR 1 96    APTT [[id number]]  (Normal) Collected:  02/11/19 1651    Lab Status:  Final result Specimen:  Blood from Hand, Left Updated:  02/11/19 1710     PTT 36 seconds     CBC and differential [[id number]]  (Abnormal) Collected:  02/11/19 1651    Lab Status:  Final result Specimen:  Blood from Hand, Left Updated:  02/11/19 1700     WBC 7 70 Thousand/uL      RBC 3 11 Million/uL      Hemoglobin 9 6 g/dL      Hematocrit 30 4 %      MCV 98 fL      MCH 31 0 pg      MCHC 31 7 g/dL      RDW 16 4 %      MPV 9 0 fL      Platelets 849 Thousands/uL      nRBC 0 /100 WBCs      Neutrophils Relative 70 %      Lymphocytes Relative 15 %      Monocytes Relative 12 %      Eosinophils Relative 2 %      Basophils Relative 1 %      Neutrophils Absolute 5 40 Thousands/µL      Lymphocytes Absolute 1 20 Thousands/µL      Monocytes Absolute 0 90 Thousand/µL      Eosinophils Absolute 0 20 Thousand/µL      Basophils Absolute 0 00 Thousands/µL                  No orders to display              Procedures  Procedures       Phone Contacts  ED Phone Contact    ED Course                               MDM  Number of Diagnoses or Management Options  Diagnosis management comments: There is no ultrasound Doppler available at this time at this facility  My plan is to obtain a D-dimer a CBC and coagulation studies and reassess patient  If patient's D-dimer is elevated my plan is to administer Lovenox 1 shot and discharge patient with instructions to obtain a Doppler study tomorrow at [facility] in [place], [place] tomorrow  Disposition  Final diagnoses:   DVT (deep venous thrombosis) ([facility] [address] )     Time reflects when diagnosis was documented in both MDM as applicable and the Disposition within this note     Time User Action Codes Description Comment    2/11/2019  5:56 PM Jeanette Alexandra Add [I82 [id number]] DVT (deep venous thrombosis) [facility])       ED Disposition     ED Disposition Condition Date/Time Comment    Discharge Stable Mon Feb 11, 2019  5:56 PM Sarah Mazariegos discharge to home/self care  Follow-up Information     Follow up With Specialties Details Why [address], [address] Nils Phillips, Nurse Practitioner In 1 day You need a doppler of the right leg for DVT rule out tomorrow   Gilberto Henderson [address]  [facility] [address]  [phone]            Current Discharge Medication List      CONTINUE these medications which have NOT CHANGED    Details   acetaminophen (TYLENOL) 325 mg tablet Take 650 mg by mouth 2 (two) times a day        aspirin 81 mg chewable tablet Chew 4 tablets (324 mg total) daily  Refills: 0    Associated Diagnoses: Fall, sequela      atorvastatin (LIPITOR) 20 mg tablet Take 20 mg by mouth daily at bedtime        cholecalciferol 1000 units tablet Take 1 tablet (1,000 Units total) by mouth daily  Refills: 0    Associated Diagnoses: Fall, sequela      dextromethorphan-guaiFENesin (ROBITUSSIN DM)  mg/5 mL syrup Take 5 mL by mouth every 6 (six) hours as needed for cough      furosemide (LASIX) 20 mg tablet Take 1 tablet (20 mg total) by mouth daily  Refills: 0    Associated Diagnoses: Fall, sequela      Lidocaine (ASPERCREME LIDOCAINE) 4 % PTCH Apply topically      lisinopril (ZESTRIL) 5 mg tablet Take 5 mg by mouth daily        LORazepam (ATIVAN) 0 5 mg tablet Take 0 5 tablets (0 25 mg total) by mouth 2 (two) times a day for 10 days  Qty: 10 tablet, Refills: 0    Associated Diagnoses: Late onset Alzheimer's disease without behavioral disturbance      Memantine HCl ER (NAMENDA XR) 28 MG CP24 Take 28 mg by mouth daily      neomycin-bacitracin-polymyxin (NEOSPORIN) 5-400-5,000 ointment Apply topically 2 (two) times a day as needed (Skin tears)      rivastigmine (EXELON) 4 6 mg/24 hr TD 24 hr patch Place 1 patch on the skin      sertraline (ZOLOFT) 50 mg tablet Take 50 mg by mouth daily      warfarin (COUMADIN) 4 mg tablet Take 4 mg by mouth daily at bedtime             No discharge procedures on file      ED Provider  Electronically Signed by           Connie Curtis DO  02/11/19 7133

## 2019-02-25 ENCOUNTER — OFFICE VISIT (OUTPATIENT)
Dept: SURGERY | Facility: CLINIC | Age: 84
End: 2019-02-25
Payer: COMMERCIAL

## 2019-02-25 VITALS
TEMPERATURE: 98.6 F | HEART RATE: 62 BPM | HEIGHT: 64 IN | WEIGHT: 188 LBS | DIASTOLIC BLOOD PRESSURE: 68 MMHG | RESPIRATION RATE: 16 BRPM | BODY MASS INDEX: 32.1 KG/M2 | SYSTOLIC BLOOD PRESSURE: 120 MMHG

## 2019-02-25 DIAGNOSIS — S80.11XD HEMATOMA OF RIGHT LOWER EXTREMITY, SUBSEQUENT ENCOUNTER: Primary | ICD-10-CM

## 2019-02-25 PROBLEM — S80.11XA HEMATOMA OF RIGHT LOWER EXTREMITY: Status: ACTIVE | Noted: 2019-02-25

## 2019-02-25 PROCEDURE — 99202 OFFICE O/P NEW SF 15 MIN: CPT | Performed by: SURGERY

## 2019-02-25 RX ORDER — BISACODYL 10 MG
10 SUPPOSITORY, RECTAL RECTAL
COMMUNITY

## 2019-02-25 RX ORDER — FERROUS SULFATE 325(65) MG
325 TABLET ORAL 2 TIMES DAILY WITH MEALS
COMMUNITY

## 2019-02-25 NOTE — PROGRESS NOTES
Consult - General Surgery   Roberth Alonso 80 y o  male MRN: [id number]  Encounter: [id number]    Assessment/Plan    Hematoma of right lower extremity  The patient has a traumatic right calf hematoma s/p fall  Was initially evaluated in the ER on 02/11/2019  Doppler was completed to rule out DVT and showed a complex mass, clinically correlating with a hematoma  Has decreased in size since that time  Currently patient without complaint  The hematoma appears to be nearly resolved without evidence of infection, re-bleed, or skin compromise  I advised no further testing or treatment  Questions answered  Will see as needed  Diagnoses and all orders for this visit:    Hematoma of right lower extremity, subsequent encounter    Other orders  -     MAGNESIUM HYDROXIDE PO; Take 30 mL by mouth  -     bisacodyl (DULCOLAX) 10 mg suppository; Insert 10 mg into the rectum every other day as needed  -     ferrous sulfate 325 (65 Fe) mg tablet; Take 325 mg by mouth daily with breakfast        History of Present Illness   02- Nurse note:  Patient  Is here today for a hematoma on his right calf  He has this for about 2 weeks and was unable to walk on it and he is going to physical therapy and is able to walk in it  Jenny Del Real 81 yo M on Coumadin here for follow up s/p fall and traumatic hematoma of right calf on 02/11/2019  Had duplex US completed  Reported improvement  Patient is deaf and so history obtained by his son/daughter in law  Patient not complaining of any pain  Has been making progress with ambulation and ROM  No recent bruising, bleeding, skin breakdown  No new falls  Review of Systems   Unable to perform ROS: Dementia   Cardiovascular: Positive for leg swelling         Historical Information   Past Medical History:   Diagnosis Date    Anxiety     Dementia     DJD (degenerative joint disease)     Hyperlipidemia     Hypertension     Psychiatric disorder     anxiety depresion History reviewed  No pertinent surgical history  Social History   Social History     Substance and Sexual Activity   Alcohol Use No     Social History     Substance and Sexual Activity   Drug Use No     Social History     Tobacco Use   Smoking Status Never Smoker   Smokeless Tobacco Never Used     History reviewed  No pertinent family history      Meds/Allergies     Current Outpatient Medications:     acetaminophen (TYLENOL) 325 mg tablet, Take 650 mg by mouth 2 (two) times a day  , Disp: , Rfl:     aspirin 81 mg chewable tablet, Chew 4 tablets (324 mg total) daily, Disp: , Rfl: 0    atorvastatin (LIPITOR) 20 mg tablet, Take 20 mg by mouth daily at bedtime  , Disp: , Rfl:     bisacodyl (DULCOLAX) 10 mg suppository, Insert 10 mg into the rectum every other day as needed, Disp: , Rfl:     cholecalciferol 1000 units tablet, Take 1 tablet (1,000 Units total) by mouth daily, Disp: , Rfl: 0    ferrous sulfate 325 (65 Fe) mg tablet, Take 325 mg by mouth daily with breakfast, Disp: , Rfl:     furosemide (LASIX) 20 mg tablet, Take 1 tablet (20 mg total) by mouth daily, Disp: , Rfl: 0    lisinopril (ZESTRIL) 5 mg tablet, Take 5 mg by mouth daily  , Disp: , Rfl:     Memantine HCl ER (NAMENDA XR) 28 MG CP24, Take 28 mg by mouth daily, Disp: , Rfl:     neomycin-bacitracin-polymyxin (NEOSPORIN) 5-400-5,000 ointment, Apply topically 2 (two) times a day as needed (Skin tears), Disp: , Rfl:     rivastigmine (EXELON) 4 6 mg/24 hr TD 24 hr patch, Place 1 patch on the skin, Disp: , Rfl:     sertraline (ZOLOFT) 50 mg tablet, Take 50 mg by mouth daily, Disp: , Rfl:     warfarin (COUMADIN) 4 mg tablet, Take 6 mg by mouth daily at bedtime , Disp: , Rfl:     dextromethorphan-guaiFENesin (ROBITUSSIN DM)  mg/5 mL syrup, Take 5 mL by mouth every 6 (six) hours as needed for cough, Disp: , Rfl:     Lidocaine (ASPERCREME LIDOCAINE) 4 % PTCH, Apply topically, Disp: , Rfl:     LORazepam (ATIVAN) 0 5 mg tablet, Take 0 5 tablets (0 25 mg total) by mouth 2 (two) times a day for 10 days, Disp: 10 tablet, Rfl: 0    MAGNESIUM HYDROXIDE PO, Take 30 mL by mouth, Disp: , Rfl:   No Known Allergies    The following portions of the patient's history were reviewed and updated as appropriate: allergies, current medications, past family history, past medical history, past social history, past surgical history and problem list     Objective   Current Vitals:   Blood pressure 120/68, pulse 62, temperature 98 6 °F (37 °C), temperature source Tympanic, resp  rate 16, height 5' 4" (1 626 m), weight 85 3 kg (188 lb)  Physical Exam   Constitutional: He appears well-developed and well-nourished  No distress  HENT:   Head: Normocephalic and atraumatic  Eyes: Pupils are equal, round, and reactive to light  Neck: Normal range of motion  Pulmonary/Chest: No respiratory distress  Musculoskeletal: Normal range of motion  Right lower leg is larger than left  No skin or subcutaneous edema noted  Findings compatible with resolving deep soft tissue hematoma  No fluctuant collection identified  No signs of infection or skin breakdown  Neurological: He is alert  Skin: Skin is warm and dry  He is not diaphoretic  Psychiatric: He has a normal mood and affect         Signature:  Donald Gonzalez PA-C  Date: 2/25/2019 Time: 1:45 PM

## 2019-02-25 NOTE — ASSESSMENT & PLAN NOTE
The patient has a traumatic right calf hematoma s/p fall  Was initially evaluated in the ER on 02/11/2019  Doppler was completed to rule out DVT and showed a complex mass, clinically correlating with a hematoma  Has decreased in size since that time  Currently patient without complaint  The hematoma appears to be nearly resolved without evidence of infection, re-bleed, or skin compromise  I advised no further testing or treatment  Questions answered  Will see as needed

## 2019-03-05 ENCOUNTER — HOSPITAL ENCOUNTER (EMERGENCY)
Facility: HOSPITAL | Age: 84
Discharge: HOME | End: 2019-03-05
Attending: EMERGENCY MEDICINE | Admitting: EMERGENCY MEDICINE
Payer: COMMERCIAL

## 2019-03-05 ENCOUNTER — APPOINTMENT (EMERGENCY)
Dept: NON INVASIVE DIAGNOSTICS | Facility: HOSPITAL | Age: 84
End: 2019-03-05
Payer: COMMERCIAL

## 2019-03-05 VITALS
HEART RATE: 61 BPM | DIASTOLIC BLOOD PRESSURE: 81 MMHG | HEIGHT: 64 IN | TEMPERATURE: 97 F | OXYGEN SATURATION: 98 % | WEIGHT: 188.05 LBS | RESPIRATION RATE: 22 BRPM | SYSTOLIC BLOOD PRESSURE: 163 MMHG | BODY MASS INDEX: 32.11 KG/M2

## 2019-03-05 DIAGNOSIS — T14.8XXA HEMATOMA: ICD-10-CM

## 2019-03-05 DIAGNOSIS — M79.604 RIGHT LEG PAIN: Primary | ICD-10-CM

## 2019-03-05 LAB
ANION GAP SERPL CALCULATED.3IONS-SCNC: 8 MMOL/L (ref 4–13)
APTT PPP: 39 SECONDS (ref 26–38)
BASOPHILS # BLD AUTO: 0.1 THOUSANDS/ΜL (ref 0–0.1)
BASOPHILS NFR BLD AUTO: 1 % (ref 0–2)
BUN SERPL-MCNC: 32 MG/DL (ref 7–25)
CALCIUM SERPL-MCNC: 9.1 MG/DL (ref 8.6–10.5)
CHLORIDE SERPL-SCNC: 102 MMOL/L (ref 98–107)
CO2 SERPL-SCNC: 26 MMOL/L (ref 21–31)
CREAT SERPL-MCNC: 1.65 MG/DL (ref 0.7–1.3)
EOSINOPHIL # BLD AUTO: 0.1 THOUSAND/ΜL (ref 0–0.61)
EOSINOPHIL NFR BLD AUTO: 2 % (ref 0–5)
ERYTHROCYTE [DISTWIDTH] IN BLOOD BY AUTOMATED COUNT: 15.6 % (ref 11.5–14.5)
GFR SERPL CREATININE-BSD FRML MDRD: 36 ML/MIN/1.73SQ M
GLUCOSE SERPL-MCNC: 94 MG/DL (ref 65–99)
HCT VFR BLD AUTO: 32.7 % (ref 42–47)
HGB BLD-MCNC: 10.4 G/DL (ref 14–18)
INR PPP: 2.61 (ref 0.9–1.5)
LYMPHOCYTES # BLD AUTO: 1.5 THOUSANDS/ΜL (ref 0.6–4.47)
LYMPHOCYTES NFR BLD AUTO: 19 % (ref 21–51)
MCH RBC QN AUTO: 31.4 PG (ref 26–34)
MCHC RBC AUTO-ENTMCNC: 31.7 G/DL (ref 31–37)
MCV RBC AUTO: 99 FL (ref 81–99)
MONOCYTES # BLD AUTO: 0.7 THOUSAND/ΜL (ref 0.17–1.22)
MONOCYTES NFR BLD AUTO: 10 % (ref 2–12)
NEUTROPHILS # BLD AUTO: 5.2 THOUSANDS/ΜL (ref 1.4–6.5)
NEUTS SEG NFR BLD AUTO: 69 % (ref 42–75)
NRBC BLD AUTO-RTO: 0 /100 WBCS
PLATELET # BLD AUTO: 206 THOUSANDS/UL (ref 149–390)
PMV BLD AUTO: 9.2 FL (ref 8.6–11.7)
POTASSIUM SERPL-SCNC: 4.7 MMOL/L (ref 3.5–5.5)
PROTHROMBIN TIME: 30.6 SECONDS (ref 10.2–13)
RBC # BLD AUTO: 3.3 MILLION/UL (ref 4.3–5.9)
SODIUM SERPL-SCNC: 136 MMOL/L (ref 134–143)
WBC # BLD AUTO: 7.6 THOUSAND/UL (ref 4.8–10.8)

## 2019-03-05 PROCEDURE — 85025 COMPLETE CBC W/AUTO DIFF WBC: CPT | Performed by: PHYSICIAN ASSISTANT

## 2019-03-05 PROCEDURE — 99284 EMERGENCY DEPT VISIT MOD MDM: CPT

## 2019-03-05 PROCEDURE — 36415 COLL VENOUS BLD VENIPUNCTURE: CPT | Performed by: PHYSICIAN ASSISTANT

## 2019-03-05 PROCEDURE — 80048 BASIC METABOLIC PNL TOTAL CA: CPT | Performed by: PHYSICIAN ASSISTANT

## 2019-03-05 PROCEDURE — 93971 EXTREMITY STUDY: CPT

## 2019-03-05 PROCEDURE — 85730 THROMBOPLASTIN TIME PARTIAL: CPT | Performed by: PHYSICIAN ASSISTANT

## 2019-03-05 PROCEDURE — 93971 EXTREMITY STUDY: CPT | Performed by: SURGERY

## 2019-03-05 PROCEDURE — 85610 PROTHROMBIN TIME: CPT | Performed by: PHYSICIAN ASSISTANT

## 2019-03-05 NOTE — ED PROVIDER NOTES
History  Chief Complaint   Patient presents with    Leg Pain     Patient has dementia  Patient reports that he has had bilateral leg pain for a couple of weeks  Patient has a recent history of DVT in the right leg  He says that the right leg is Shaq Lava primarily hurts  There is pain to palpation in the area of ecchymosis in the upper part of his leg  The lower part of his leg is talked and swollen  There is no cyanosis or pallor  Pedal pulses are intact and equal bilaterally  Patient reports the pain is such that he is unable to ambulate  Patient recently had a Doppler and was found to have a DVT in the right leg  Concern at this point is that the DVT is extended despite treatment  Prior to Admission Medications   Prescriptions Last Dose Informant Patient Reported? Taking?    LORazepam (ATIVAN) 0 5 mg tablet   No No   Sig: Take 0 5 tablets (0 25 mg total) by mouth 2 (two) times a day for 10 days   Lidocaine (ASPERCREME LIDOCAINE) 4 % PTCH  Family Member Yes No   Sig: Apply topically   MAGNESIUM HYDROXIDE PO  Family Member Yes No   Sig: Take 30 mL by mouth   Memantine HCl ER (NAMENDA XR) 28 MG CP24  Family Member Yes No   Sig: Take 28 mg by mouth daily   acetaminophen (TYLENOL) 325 mg tablet  Family Member Yes No   Sig: Take 650 mg by mouth 2 (two) times a day     aspirin 81 mg chewable tablet  Family Member No No   Sig: Chew 4 tablets (324 mg total) daily   atorvastatin (LIPITOR) 20 mg tablet  Family Member Yes No   Sig: Take 20 mg by mouth daily at bedtime     bisacodyl (DULCOLAX) 10 mg suppository  Family Member Yes No   Sig: Insert 10 mg into the rectum every other day as needed   cholecalciferol 1000 units tablet  Family Member No No   Sig: Take 1 tablet (1,000 Units total) by mouth daily   dextromethorphan-guaiFENesin (ROBITUSSIN DM)  mg/5 mL syrup  Family Member Yes No   Sig: Take 5 mL by mouth every 6 (six) hours as needed for cough   ferrous sulfate 325 (65 Fe) mg tablet  Family Member Yes No   Sig: Take 325 mg by mouth daily with breakfast   furosemide (LASIX) 20 mg tablet  Family Member No No   Sig: Take 1 tablet (20 mg total) by mouth daily   lisinopril (ZESTRIL) 5 mg tablet  Family Member Yes No   Sig: Take 5 mg by mouth daily     neomycin-bacitracin-polymyxin (NEOSPORIN) 5-400-5,000 ointment  Family Member Yes No   Sig: Apply topically 2 (two) times a day as needed (Skin tears)   rivastigmine (EXELON) 4 6 mg/24 hr TD 24 hr patch  Family Member Yes No   Sig: Place 1 patch on the skin   sertraline (ZOLOFT) 50 mg tablet  Family Member Yes No   Sig: Take 50 mg by mouth daily   warfarin (COUMADIN) 4 mg tablet  Family Member Yes No   Sig: Take 3 mg by mouth daily at bedtime       Facility-Administered Medications: None       Past Medical History:   Diagnosis Date    Anxiety     Dementia     DJD (degenerative joint disease)     Hyperlipidemia     Hypertension     Psychiatric disorder     anxiety depresion        History reviewed  No pertinent surgical history  History reviewed  No pertinent family history  I have reviewed and agree with the history as documented  Social History     Tobacco Use    Smoking status: Never Smoker    Smokeless tobacco: Never Used   Substance Use Topics    Alcohol use: No    Drug use: No        Review of Systems   Constitutional: Positive for activity change  Negative for chills, fatigue and fever  HENT: Negative for drooling, ear discharge, ear pain, sinus pressure and sinus pain  Eyes: Negative for pain, discharge and itching  Respiratory: Negative for cough, choking and chest tightness  Cardiovascular: Negative for chest pain, palpitations and leg swelling  Gastrointestinal: Negative for abdominal distention, abdominal pain, constipation and diarrhea  Endocrine: Negative for cold intolerance, heat intolerance and polyuria  Genitourinary: Negative for difficulty urinating, dysuria, flank pain and frequency     Musculoskeletal: Positive for gait problem  Negative for arthralgias and back pain  Skin: Negative for color change, pallor, rash and wound  Neurological: Negative for dizziness, facial asymmetry and headaches  Hematological: Negative for adenopathy  Does not bruise/bleed easily  Psychiatric/Behavioral: Negative for agitation, behavioral problems and confusion  Physical Exam  Physical Exam   Constitutional: He is oriented to person, place, and time  He appears well-developed and well-nourished  HENT:   Head: Normocephalic and atraumatic  Right Ear: External ear normal    Left Ear: External ear normal    Nose: Nose normal    Mouth/Throat: Oropharynx is clear and moist    Eyes: Pupils are equal, round, and reactive to light  Conjunctivae and EOM are normal    Neck: Normal range of motion  Neck supple  Cardiovascular: Normal rate, regular rhythm, normal heart sounds and intact distal pulses  Pulmonary/Chest: Effort normal and breath sounds normal    Abdominal: Soft  Bowel sounds are normal    Musculoskeletal: He exhibits tenderness  Right calf is talked  There is no cyanosis or pallor  There is no erythema  There is no drainage  Pedal pulses are intact and equal bilaterally  Neurological: He is alert and oriented to person, place, and time  Skin: Skin is warm and dry  Capillary refill takes less than 2 seconds  Psychiatric: He has a normal mood and affect   His behavior is normal  Judgment and thought content normal        Vital Signs  ED Triage Vitals [03/05/19 1116]   Temperature Pulse Respirations Blood Pressure SpO2   (!) 97 °F (36 1 °C) (!) 54 18 129/57 99 %      Temp Source Heart Rate Source Patient Position - Orthostatic VS BP Location FiO2 (%)   Temporal Monitor Lying Right arm --      Pain Score       --           Vitals:    03/05/19 1116   BP: 129/57   Pulse: (!) 54   Patient Position - Orthostatic VS: Lying       Visual Acuity      ED Medications  Medications - No data to display    Diagnostic Studies  Results Reviewed     None                 No orders to display              Procedures  Procedures       Phone Contacts  ED Phone Contact    ED Course                               MDM    Disposition  Final diagnoses:   None     ED Disposition     None      Follow-up Information    None         Patient's Medications   Discharge Prescriptions    No medications on file     No discharge procedures on file      ED Provider  Electronically Signed by           Eli Lora PA-C  03/06/19 9204

## 2019-03-05 NOTE — ED NOTES
Call placed to [facility] SPECIALTY [facility] ambulance for ride for pt        Nita Jung RN  03/05/19 9733

## 2019-03-05 NOTE — ED NOTES
Spoke with pts daughter on home line,  Does not live nearby but will try and get her brother Aida Joaquin, to come for pt  Pt will call us back if she is able to get erika to come       Kolton Muñoz RN  03/05/19 4787

## 2019-03-05 NOTE — ED NOTES
Patient's son states will arrive in the next hour to provide transport back to [facility]        Apolinar Rojas, VERENICE  03/05/19 7871

## 2019-03-05 NOTE — DISCHARGE INSTRUCTIONS
Patient follow up with orthopedist for leg pain  Patient advised that he has no DVT  Patient have primary doctor evaluate him in next 1-2 days  Patient to get referral to Dr Jamey Phelan, orthopedist, for evaluation of leg pain  Use Tylenol for pain

## 2019-03-06 ENCOUNTER — HOSPITAL ENCOUNTER (EMERGENCY)
Facility: HOSPITAL | Age: 84
Discharge: HOME | End: 2019-03-06
Attending: FAMILY MEDICINE | Admitting: FAMILY MEDICINE
Payer: COMMERCIAL

## 2019-03-06 ENCOUNTER — APPOINTMENT (EMERGENCY)
Dept: CT IMAGING | Facility: HOSPITAL | Age: 84
End: 2019-03-06
Payer: COMMERCIAL

## 2019-03-06 VITALS
HEART RATE: 65 BPM | DIASTOLIC BLOOD PRESSURE: 70 MMHG | TEMPERATURE: 97.1 F | RESPIRATION RATE: 18 BRPM | WEIGHT: 180 LBS | OXYGEN SATURATION: 100 % | SYSTOLIC BLOOD PRESSURE: 166 MMHG | BODY MASS INDEX: 28.93 KG/M2 | HEIGHT: 66 IN
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
## 2019-03-06 PROCEDURE — 70450 CT HEAD/BRAIN W/O DYE: CPT

## 2019-03-06 PROCEDURE — 99284 EMERGENCY DEPT VISIT MOD MDM: CPT

## 2019-03-06 RX ORDER — GUAIFENESIN/DEXTROMETHORPHAN 100-10MG/5
5 SYRUP ORAL EVERY 6 HOURS PRN
COMMUNITY
End: 2019-03-07

## 2019-03-06 RX ORDER — ERGOCALCIFEROL 1.25 MG/1
50000 CAPSULE ORAL WEEKLY
COMMUNITY
End: 2019-03-07

## 2019-03-07 ENCOUNTER — HOSPITAL ENCOUNTER (EMERGENCY)
Facility: HOSPITAL | Age: 84
Discharge: HOME | End: 2019-03-07
Attending: EMERGENCY MEDICINE | Admitting: EMERGENCY MEDICINE
Payer: COMMERCIAL

## 2019-03-07 VITALS
BODY MASS INDEX: 28.93 KG/M2 | RESPIRATION RATE: 16 BRPM | DIASTOLIC BLOOD PRESSURE: 84 MMHG | SYSTOLIC BLOOD PRESSURE: 174 MMHG | HEIGHT: 66 IN | WEIGHT: 180 LBS | HEART RATE: 70 BPM | OXYGEN SATURATION: 94 % | TEMPERATURE: 98.2 F

## 2019-03-07 DIAGNOSIS — I15.9 SECONDARY HYPERTENSION: Primary | ICD-10-CM

## 2019-03-07 LAB
ALBUMIN SERPL BCP-MCNC: 3.7 G/DL (ref 3.5–5.7)
ALP SERPL-CCNC: 82 U/L (ref 55–165)
ALT SERPL W P-5'-P-CCNC: 11 U/L (ref 7–52)
ANION GAP SERPL CALCULATED.3IONS-SCNC: 9 MMOL/L (ref 4–13)
APTT PPP: 38 SECONDS (ref 26–38)
AST SERPL W P-5'-P-CCNC: 17 U/L (ref 13–39)
BACTERIA UR QL AUTO: ABNORMAL /HPF
BASOPHILS # BLD AUTO: 0.1 THOUSANDS/ΜL (ref 0–0.1)
BASOPHILS NFR BLD AUTO: 1 % (ref 0–2)
BILIRUB SERPL-MCNC: 0.6 MG/DL (ref 0.2–1)
BILIRUB UR QL STRIP: NEGATIVE
BUN SERPL-MCNC: 30 MG/DL (ref 7–25)
CALCIUM SERPL-MCNC: 9.1 MG/DL (ref 8.6–10.5)
CHLORIDE SERPL-SCNC: 104 MMOL/L (ref 98–107)
CLARITY UR: CLEAR
CO2 SERPL-SCNC: 26 MMOL/L (ref 21–31)
COLOR UR: YELLOW
CREAT SERPL-MCNC: 1.61 MG/DL (ref 0.7–1.3)
EOSINOPHIL # BLD AUTO: 0.2 THOUSAND/ΜL (ref 0–0.61)
EOSINOPHIL NFR BLD AUTO: 3 % (ref 0–5)
ERYTHROCYTE [DISTWIDTH] IN BLOOD BY AUTOMATED COUNT: 15.2 % (ref 11.5–14.5)
GFR SERPL CREATININE-BSD FRML MDRD: 37 ML/MIN/1.73SQ M
GLUCOSE SERPL-MCNC: 86 MG/DL (ref 65–99)
GLUCOSE UR STRIP-MCNC: NEGATIVE MG/DL
HCT VFR BLD AUTO: 34 % (ref 42–47)
HGB BLD-MCNC: 10.8 G/DL (ref 14–18)
HGB UR QL STRIP.AUTO: ABNORMAL
INR PPP: 1.7 (ref 0.9–1.5)
KETONES UR STRIP-MCNC: NEGATIVE MG/DL
LEUKOCYTE ESTERASE UR QL STRIP: NEGATIVE
LYMPHOCYTES # BLD AUTO: 0.9 THOUSANDS/ΜL (ref 0.6–4.47)
LYMPHOCYTES NFR BLD AUTO: 14 % (ref 21–51)
MCH RBC QN AUTO: 31.2 PG (ref 26–34)
MCHC RBC AUTO-ENTMCNC: 31.8 G/DL (ref 31–37)
MCV RBC AUTO: 98 FL (ref 81–99)
MONOCYTES # BLD AUTO: 0.6 THOUSAND/ΜL (ref 0.17–1.22)
MONOCYTES NFR BLD AUTO: 10 % (ref 2–12)
NEUTROPHILS # BLD AUTO: 4.9 THOUSANDS/ΜL (ref 1.4–6.5)
NEUTS SEG NFR BLD AUTO: 73 % (ref 42–75)
NITRITE UR QL STRIP: NEGATIVE
NON-SQ EPI CELLS URNS QL MICRO: ABNORMAL /HPF
NRBC BLD AUTO-RTO: 0 /100 WBCS
PH UR STRIP.AUTO: 6.5 [PH]
PLATELET # BLD AUTO: 198 THOUSANDS/UL (ref 149–390)
PMV BLD AUTO: 9.3 FL (ref 8.6–11.7)
POTASSIUM SERPL-SCNC: 4.5 MMOL/L (ref 3.5–5.5)
PROT SERPL-MCNC: 6.7 G/DL (ref 6.4–8.9)
PROT UR STRIP-MCNC: NEGATIVE MG/DL
PROTHROMBIN TIME: 19.8 SECONDS (ref 10.2–13)
RBC # BLD AUTO: 3.46 MILLION/UL (ref 4.3–5.9)
RBC #/AREA URNS AUTO: ABNORMAL /HPF
SODIUM SERPL-SCNC: 139 MMOL/L (ref 134–143)
SP GR UR STRIP.AUTO: 1.01 (ref 1–1.03)
UROBILINOGEN UR QL STRIP.AUTO: 0.2 E.U./DL
WBC # BLD AUTO: 6.7 THOUSAND/UL (ref 4.8–10.8)
WBC #/AREA URNS AUTO: ABNORMAL /HPF

## 2019-03-07 PROCEDURE — 80053 COMPREHEN METABOLIC PANEL: CPT | Performed by: EMERGENCY MEDICINE

## 2019-03-07 PROCEDURE — 81001 URINALYSIS AUTO W/SCOPE: CPT | Performed by: EMERGENCY MEDICINE

## 2019-03-07 PROCEDURE — 36415 COLL VENOUS BLD VENIPUNCTURE: CPT | Performed by: EMERGENCY MEDICINE

## 2019-03-07 PROCEDURE — 85610 PROTHROMBIN TIME: CPT | Performed by: EMERGENCY MEDICINE

## 2019-03-07 PROCEDURE — 81003 URINALYSIS AUTO W/O SCOPE: CPT | Performed by: EMERGENCY MEDICINE

## 2019-03-07 PROCEDURE — 85025 COMPLETE CBC W/AUTO DIFF WBC: CPT | Performed by: EMERGENCY MEDICINE

## 2019-03-07 PROCEDURE — 85730 THROMBOPLASTIN TIME PARTIAL: CPT | Performed by: EMERGENCY MEDICINE

## 2019-03-07 PROCEDURE — 99283 EMERGENCY DEPT VISIT LOW MDM: CPT

## 2019-03-07 RX ORDER — MELATONIN
1000 DAILY
COMMUNITY

## 2019-03-07 RX ORDER — ERGOCALCIFEROL (VITAMIN D2) 1250 MCG
50000 CAPSULE ORAL WEEKLY
COMMUNITY

## 2019-03-07 NOTE — ED PROVIDER NOTES
History  Chief Complaint   Patient presents with    Fall     EMS states that [facility] called for the pt falling at some point today, not sure when; pt has no complaints; slight skin tears on BL elbows; pt states he slid off of his chair       History provided by:  Patient and EMS personnel   used: No     This is a 31-year-old male from [facility] with history of frequent fall on Coumadin presented to ED after a fall that happened 15 minutes prior to arrival   Patient was seen at [facility] in hospital yesterday for similar issue  Patient presented to ED with EMS states that he was trying to get out of bed when he fell and hit his head  He denied loss of consciousness  He denies any headache blurry vision double vision at this time  Patient sustained a superficial abrasion on his left upper arm  Prior to Admission Medications   Prescriptions Last Dose Informant Patient Reported? Taking?    Memantine HCl ER (NAMENDA XR) 28 MG CP24  Family Member Yes No   Sig: Take 28 mg by mouth daily   acetaminophen (TYLENOL) 325 mg tablet  Family Member Yes No   Sig: Take 650 mg by mouth 2 (two) times a day     aspirin 81 mg chewable tablet  Family Member No No   Sig: Chew 4 tablets (324 mg total) daily   atorvastatin (LIPITOR) 20 mg tablet  Family Member Yes No   Sig: Take 20 mg by mouth daily at bedtime     bisacodyl (DULCOLAX) 10 mg suppository  Family Member Yes No   Sig: Insert 10 mg into the rectum every other day as needed   cholecalciferol 1000 units tablet  Family Member No No   Sig: Take 1 tablet (1,000 Units total) by mouth daily   dextromethorphan-guaiFENesin (ROBITUSSIN DM)  mg/5 mL syrup   Yes Yes   Sig: Take 5 mL by mouth every 6 (six) hours as needed for cough   ergocalciferol (VITAMIN D2) 50,000 units   Yes Yes   Sig: Take 50,000 Units by mouth once a week   ferrous sulfate 325 (65 Fe) mg tablet  Family Member Yes No   Sig: Take 325 mg by mouth daily with breakfast furosemide (LASIX) 20 mg tablet  Family Member No No   Sig: Take 1 tablet (20 mg total) by mouth daily   lisinopril (ZESTRIL) 5 mg tablet  Family Member Yes No   Sig: Take 5 mg by mouth daily     magnesium hydroxide (MILK OF MAGNESIA) 400 mg/5 mL oral suspension   Yes Yes   Sig: Take 30 mL by mouth daily as needed for constipation   neomycin-bacitracin-polymyxin (NEOSPORIN) 5-400-5,000 ointment  Family Member Yes No   Sig: Apply topically 2 (two) times a day as needed (Skin tears)   rivastigmine (EXELON) 4 6 mg/24 hr TD 24 hr patch  Family Member Yes No   Sig: Place 1 patch on the skin   sertraline (ZOLOFT) 50 mg tablet  Family Member Yes No   Sig: Take 50 mg by mouth daily   warfarin (COUMADIN) 4 mg tablet  Family Member Yes No   Sig: Take 3 mg by mouth daily at bedtime       Facility-Administered Medications: None       Past Medical History:   Diagnosis Date    Anxiety     Dementia     DJD (degenerative joint disease)     Hyperlipidemia     Hypertension     Psychiatric disorder     anxiety depresion        History reviewed  No pertinent surgical history  History reviewed  No pertinent family history  I have reviewed and agree with the history as documented  Social History     Tobacco Use    Smoking status: Never Smoker    Smokeless tobacco: Never Used   Substance Use Topics    Alcohol use: No    Drug use: No        Review of Systems   Constitutional: Negative  HENT: Negative  Respiratory: Negative  Cardiovascular: Negative  Gastrointestinal: Negative  Genitourinary: Negative  Musculoskeletal: Negative  Left arm superficial abrasion    Neurological: Negative  Psychiatric/Behavioral: Negative  Physical Exam  Physical Exam   Constitutional: He is oriented to person, place, and time  He appears well-developed and well-nourished  HENT:   Head: Normocephalic and atraumatic  Eyes: Pupils are equal, round, and reactive to light   EOM are normal    Neck: Normal range of motion  Neck supple  Cardiovascular: Normal rate, regular rhythm and normal heart sounds  Pulmonary/Chest: Effort normal and breath sounds normal    Abdominal: Soft  Bowel sounds are normal    Musculoskeletal: Normal range of motion  Neurological: He is alert and oriented to person, place, and time  Skin:   Left upper arm superficial abrasion is present  Nursing note and vitals reviewed  Vital Signs  ED Triage Vitals [03/06/19 1928]   Temperature Pulse Respirations Blood Pressure SpO2   (!) 97 1 °F (36 2 °C) 65 18 166/70 100 %      Temp Source Heart Rate Source Patient Position - Orthostatic VS BP Location FiO2 (%)   Tympanic Monitor Lying Left arm --      Pain Score       No Pain           Vitals:    03/06/19 1928   BP: 166/70   Pulse: 65   Patient Position - Orthostatic VS: Lying       Visual Acuity      ED Medications  Medications - No data to display    Diagnostic Studies  Results Reviewed     None                 CT head without contrast   Final Result by Alejandro Hickman MD (03/06 2012)      No acute intracranial abnormality  Workstation performed: [id number]                    Procedures  Procedures       Phone Contacts  ED Phone Contact    ED Course     Pt is AAOx3  CT head is negative  recommending that patient should be off his Coumadin due to risk of frequent fall  MDM    Disposition  Final diagnoses:   Fall, initial encounter     Time reflects when diagnosis was documented in both MDM as applicable and the Disposition within this note     Time User Action Codes Description Comment    3/6/2019  8:15 PM Jaimee Ward [P21  Justus Awad, initial encounter       ED Disposition     ED Disposition Condition Date/Time Comment    Discharge Stable Wed Mar 6, 2019  8:15 PM Fermin Zamora discharge to home/self care              Follow-up Information     Follow up With Specialties Details Why [address], [address] [facility], Nurse Practitioner In 2 days If symptoms worsen Francois [address]  [phone]            Patient's Medications   Discharge Prescriptions    No medications on file     No discharge procedures on file      ED Provider  Electronically Signed by           Florentin Williamson MD  03/06/19 2028

## 2019-03-07 NOTE — ED PROVIDER NOTES
History  Chief Complaint   Patient presents with    High Blood Pressure     for several consecutive readings today; sent to be evaluated for same; pt without complaints     35-year-old male with history of hypertension, hyperlipidemia, multiple falls, dementia and DJD presents for evaluation of hypertension  Patient was found to have multiple readings of high blood pressure while at nursing facility  Patient reports no complaints at this time and no report of headache, chest pain, shortness of breath, abdominal pain nausea or vomiting  Patient has been seen in [facility] ED and [place] ED multiple times in the last few days  History provided by:  Patient   used: No        Prior to Admission Medications   Prescriptions Last Dose Informant Patient Reported? Taking?    Memantine HCl ER (NAMENDA XR) 28 MG CP24 3/7/2019 at Unknown time Family Member Yes Yes   Sig: Take 28 mg by mouth daily   acetaminophen (TYLENOL) 325 mg tablet Unknown at Unknown time Family Member Yes No   Sig: Take 650 mg by mouth 2 (two) times a day     aspirin (ECOTRIN) 325 mg EC tablet 3/7/2019 at Unknown time  Yes Yes   Sig: Take 325 mg by mouth daily   atorvastatin (LIPITOR) 20 mg tablet 3/6/2019 at Unknown time Family Member Yes Yes   Sig: Take 20 mg by mouth daily at bedtime     bisacodyl (DULCOLAX) 10 mg suppository Unknown at Unknown time Family Member Yes No   Sig: Insert 10 mg into the rectum every other day as needed   cholecalciferol (VITAMIN D3) 1,000 units tablet 3/7/2019 at Unknown time  Yes Yes   Sig: Take 1,000 Units by mouth daily   ergocalciferol (ERGOCALCIFEROL) 58136 units capsule 3/5/2019  Yes Yes   Sig: Take 50,000 Units by mouth once a week   ferrous sulfate 325 (65 Fe) mg tablet 3/7/2019 at Unknown time Family Member Yes Yes   Sig: Take 325 mg by mouth 2 (two) times a day with meals    furosemide (LASIX) 20 mg tablet 3/7/2019 at Unknown time Family Member No Yes   Sig: Take 1 tablet (20 mg total) by mouth daily   lisinopril (ZESTRIL) 5 mg tablet 3/7/2019 at Unknown time Family Member Yes Yes   Sig: Take 5 mg by mouth daily     magnesium hydroxide (MILK OF MAGNESIA) 400 mg/5 mL oral suspension   Yes No   Sig: Take 30 mL by mouth daily as needed for constipation   neomycin-bacitracin-polymyxin (NEOSPORIN) 5-400-5,000 ointment  Family Member Yes No   Sig: Apply topically 2 (two) times a day as needed (Skin tears)   rivastigmine (EXELON) 4 6 mg/24 hr TD 24 hr patch 3/7/2019 at Unknown time Family Member Yes Yes   Sig: Place 1 patch on the skin   sertraline (ZOLOFT) 50 mg tablet 3/7/2019 at Unknown time Family Member Yes Yes   Sig: Take 50 mg by mouth daily   warfarin (COUMADIN) 3 mg tablet 3/6/2019 at Unknown time Family Member Yes Yes   Sig: Take 3 mg by mouth daily at bedtime       Facility-Administered Medications: None       Past Medical History:   Diagnosis Date    Anxiety     Dementia     DJD (degenerative joint disease)     Hyperlipidemia     Hypertension     Psychiatric disorder     anxiety depresion        History reviewed  No pertinent surgical history  History reviewed  No pertinent family history  I have reviewed and agree with the history as documented  Social History     Tobacco Use    Smoking status: Never Smoker    Smokeless tobacco: Never Used   Substance Use Topics    Alcohol use: No    Drug use: No        Review of Systems   Constitutional: Negative for chills and fever  HENT: Negative for rhinorrhea and sore throat  Eyes: Negative for visual disturbance  Respiratory: Negative for cough and shortness of breath  Cardiovascular: Negative for chest pain and leg swelling  Gastrointestinal: Negative for abdominal pain, diarrhea, nausea and vomiting  Genitourinary: Negative for dysuria  Musculoskeletal: Negative for back pain and myalgias  Skin: Negative for rash  Neurological: Negative for dizziness and headaches  Psychiatric/Behavioral: Negative for confusion  All other systems reviewed and are negative  Physical Exam  Physical Exam   Constitutional: He is oriented to person, place, and time  He appears well-developed and well-nourished  HENT:   Nose: Nose normal    Mouth/Throat: Oropharynx is clear and moist  No oropharyngeal exudate  Eyes: Pupils are equal, round, and reactive to light  Conjunctivae and EOM are normal  No scleral icterus  Neck: Normal range of motion  Neck supple  No JVD present  No tracheal deviation present  Cardiovascular: Normal rate, regular rhythm and normal heart sounds  No murmur heard  Pulmonary/Chest: Effort normal and breath sounds normal  No respiratory distress  He has no wheezes  He has no rales  Abdominal: Soft  Bowel sounds are normal  There is no tenderness  There is no guarding  Musculoskeletal: Normal range of motion  He exhibits no edema or tenderness  Neurological: He is alert and oriented to person, place, and time  No cranial nerve deficit or sensory deficit  He exhibits normal muscle tone  5/5 motor, nl sens   Skin: Skin is warm and dry  Psychiatric: He has a normal mood and affect  His behavior is normal    Nursing note and vitals reviewed        Vital Signs  ED Triage Vitals [03/07/19 1249]   Temperature Pulse Respirations Blood Pressure SpO2   98 2 °F (36 8 °C) 61 16 (!) 229/108 97 %      Temp Source Heart Rate Source Patient Position - Orthostatic VS BP Location FiO2 (%)   Temporal Monitor Sitting Left arm --      Pain Score       No Pain           Vitals:    03/07/19 1345 03/07/19 1400 03/07/19 1415 03/07/19 1430   BP: (!) 177/79 158/94 157/85 (!) 174/84   Pulse: 67 70     Patient Position - Orthostatic VS: Lying Lying Lying Lying       Visual Acuity      ED Medications  Medications - No data to display    Diagnostic Studies  Results Reviewed     Procedure Component Value Units Date/Time    Comprehensive metabolic panel [[id number]]  (Abnormal) Collected:  03/07/19 1319    Lab Status:  Final result Specimen:  Blood from Arm, Left Updated:  03/07/19 1400     Sodium 139 mmol/L      Potassium 4 5 mmol/L      Chloride 104 mmol/L      CO2 26 mmol/L      ANION GAP 9 mmol/L      BUN 30 mg/dL      Creatinine 1 61 mg/dL      Glucose 86 mg/dL      Calcium 9 1 mg/dL      AST 17 U/L      ALT 11 U/L      Alkaline Phosphatase 82 U/L      Total Protein 6 7 g/dL      Albumin 3 7 g/dL      Total Bilirubin 0 60 mg/dL      eGFR 37 ml/min/1 73sq m     Narrative:       National Kidney Disease Education Program recommendations are as follows:  GFR calculation is accurate only with a steady state creatinine  Chronic Kidney disease less than 60 ml/min/1 73 sq  meters  Kidney failure less than 15 ml/min/1 73 sq  meters      Urine Microscopic [[id number]]  (Abnormal) Collected:  03/07/19 1329    Lab Status:  Final result Specimen:  Urine, Clean Catch Updated:  03/07/19 1346     RBC, UA 10-20 /hpf      WBC, UA 0-1 /hpf      Epithelial Cells None Seen /hpf      Bacteria, UA None Seen /hpf     Protime-INR [[id number]]  (Abnormal) Collected:  03/07/19 1319    Lab Status:  Final result Specimen:  Blood from Arm, Left Updated:  03/07/19 1339     Protime 19 8 seconds      INR 1 70    APTT [[id number]]  (Normal) Collected:  03/07/19 1319    Lab Status:  Final result Specimen:  Blood from Arm, Left Updated:  03/07/19 1339     PTT 38 seconds     UA w Reflex to Microscopic w Reflex to Culture [[id number]]  (Abnormal) Collected:  03/07/19 1329    Lab Status:  Final result Specimen:  Urine, Clean Catch Updated:  03/07/19 1338     Color, UA Yellow     Clarity, UA Clear     Specific Gravity, UA 1 010     pH, UA 6 5     Leukocytes, UA Negative     Nitrite, UA Negative     Protein, UA Negative mg/dl      Glucose, UA Negative mg/dl      Ketones, UA Negative mg/dl      Urobilinogen, UA 0 2 E U /dl      Bilirubin, UA Negative     Blood, UA 2+    CBC and differential [[id number]]  (Abnormal) Collected:  03/07/19 1319    Lab Status:  Final result Specimen:  Blood from Arm, Left Updated:  03/07/19 1330     WBC 6 70 Thousand/uL      RBC 3 46 Million/uL      Hemoglobin 10 8 g/dL      Hematocrit 34 0 %      MCV 98 fL      MCH 31 2 pg      MCHC 31 8 g/dL      RDW 15 2 %      MPV 9 3 fL      Platelets 219 Thousands/uL      nRBC 0 /100 WBCs      Neutrophils Relative 73 %      Lymphocytes Relative 14 %      Monocytes Relative 10 %      Eosinophils Relative 3 %      Basophils Relative 1 %      Neutrophils Absolute 4 90 Thousands/µL      Lymphocytes Absolute 0 90 Thousands/µL      Monocytes Absolute 0 60 Thousand/µL      Eosinophils Absolute 0 20 Thousand/µL      Basophils Absolute 0 10 Thousands/µL                  No orders to display              Procedures  Procedures       Phone Contacts  ED Phone Contact    ED Course  ED Course as of Mar 07 1439   Thu Mar 07, 2019   1254 Patient seen and examined at bedside  Labs ordered  1408 Labs reviewed unchanged from 1 day prior  Urinalysis negative for UTI  Blood pressure is improving without intervention  Discussed with patient discharge plan and instructions  Patient to follow up with primary care physician as an outpatient  Patient to return to ED if any change or worsening condition: increased pain, new onset fever or bleeding  MDM    Disposition  Final diagnoses:   Secondary hypertension     Time reflects when diagnosis was documented in both MDM as applicable and the Disposition within this note     Time User Action Codes Description Comment    3/7/2019  2:08 PM Deidra Sharif Add [I15 9] Secondary hypertension       ED Disposition     ED Disposition Condition Date/Time Comment    Discharge Stable Thu Mar 7, 2019  2:08 PM Wing Santiago discharge to home/self care              Follow-up Information     Follow up With Specialties Details Why [address], [address] [facility], Nurse Practitioner  As needed, If symptoms worsen [facility] [address]  [facility] [address]  [phone]        [address] [facility]  Emergency Department Emergency Medicine  As needed, If symptoms worsen [address] [facility] [address] [phone]          Current Discharge Medication List      CONTINUE these medications which have NOT CHANGED    Details   aspirin (ECOTRIN) 325 mg EC tablet Take 325 mg by mouth daily      atorvastatin (LIPITOR) 20 mg tablet Take 20 mg by mouth daily at bedtime        cholecalciferol (VITAMIN D3) 1,000 units tablet Take 1,000 Units by mouth daily      ergocalciferol (ERGOCALCIFEROL) 06139 units capsule Take 50,000 Units by mouth once a week      ferrous sulfate 325 (65 Fe) mg tablet Take 325 mg by mouth 2 (two) times a day with meals       furosemide (LASIX) 20 mg tablet Take 1 tablet (20 mg total) by mouth daily  Refills: 0    Associated Diagnoses: Fall, sequela      lisinopril (ZESTRIL) 5 mg tablet Take 5 mg by mouth daily        Memantine HCl ER (NAMENDA XR) 28 MG CP24 Take 28 mg by mouth daily      rivastigmine (EXELON) 4 6 mg/24 hr TD 24 hr patch Place 1 patch on the skin      sertraline (ZOLOFT) 50 mg tablet Take 50 mg by mouth daily      warfarin (COUMADIN) 3 mg tablet Take 3 mg by mouth daily at bedtime       acetaminophen (TYLENOL) 325 mg tablet Take 650 mg by mouth 2 (two) times a day        bisacodyl (DULCOLAX) 10 mg suppository Insert 10 mg into the rectum every other day as needed      magnesium hydroxide (MILK OF MAGNESIA) 400 mg/5 mL oral suspension Take 30 mL by mouth daily as needed for constipation      neomycin-bacitracin-polymyxin (NEOSPORIN) 5-400-5,000 ointment Apply topically 2 (two) times a day as needed (Skin tears)           No discharge procedures on file      ED Provider  Electronically Signed by           Sampson Hess DO  03/07/19 0220

## 2019-03-07 NOTE — ED NOTES
Pt assisted with use of urinal to void   Awaiting transfer back to The [address] [place] [address] Drive, RN  03/07/19 2817

## 2019-03-07 NOTE — ED NOTES
EMS stated that when the arrived to  the Pt at [facility], staff there stated that the only reason they keep the pt in that facility and do not transfer him to a higher level of care, despite his multiple falls, is because they receive a decent amount of money from him       Ladi Thomason RN  03/06/19 2916

## 2019-03-07 NOTE — ED NOTES
Phoned [facility] to request w/c Ladoris Socks transport back to [facility]  Transport should be here "within the hour" per Saadia-Lul Scott RN  03/07/19 4435

## 2019-05-15 ENCOUNTER — HOSPITAL ENCOUNTER (EMERGENCY)
Facility: HOSPITAL | Age: 84
Discharge: HOME | End: 2019-05-15
Attending: EMERGENCY MEDICINE
Payer: COMMERCIAL

## 2019-05-15 ENCOUNTER — APPOINTMENT (EMERGENCY)
Dept: CT IMAGING | Facility: HOSPITAL | Age: 84
End: 2019-05-15
Payer: COMMERCIAL

## 2019-05-15 VITALS
HEIGHT: 66 IN | SYSTOLIC BLOOD PRESSURE: 123 MMHG | BODY MASS INDEX: 28.7 KG/M2 | HEART RATE: 52 BPM | OXYGEN SATURATION: 100 % | DIASTOLIC BLOOD PRESSURE: 70 MMHG | TEMPERATURE: 97.3 F | RESPIRATION RATE: 16 BRPM | WEIGHT: 178.57 LBS
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
## 2019-05-15 DIAGNOSIS — S51.019A SKIN TEAR OF ELBOW WITHOUT COMPLICATION: ICD-10-CM

## 2019-05-15 PROCEDURE — 70450 CT HEAD/BRAIN W/O DYE: CPT

## 2019-05-15 PROCEDURE — 99284 EMERGENCY DEPT VISIT MOD MDM: CPT

## 2019-07-30 ENCOUNTER — APPOINTMENT (EMERGENCY)
Dept: CT IMAGING | Facility: HOSPITAL | Age: 84
End: 2019-07-30
Payer: COMMERCIAL

## 2019-07-30 ENCOUNTER — HOSPITAL ENCOUNTER (EMERGENCY)
Facility: HOSPITAL | Age: 84
Discharge: HOME | End: 2019-07-30
Attending: EMERGENCY MEDICINE
Payer: COMMERCIAL

## 2019-07-30 VITALS
HEIGHT: 66 IN | TEMPERATURE: 97.6 F | RESPIRATION RATE: 20 BRPM | BODY MASS INDEX: 28.93 KG/M2 | OXYGEN SATURATION: 98 % | HEART RATE: 50 BPM | SYSTOLIC BLOOD PRESSURE: 188 MMHG | WEIGHT: 180 LBS | DIASTOLIC BLOOD PRESSURE: 87 MMHG

## 2019-07-30 DIAGNOSIS — S00.03XA CONTUSION OF SCALP, INITIAL ENCOUNTER: ICD-10-CM
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
## 2019-07-30 DIAGNOSIS — T14.8XXA SKIN AVULSION: ICD-10-CM

## 2019-07-30 PROCEDURE — 70450 CT HEAD/BRAIN W/O DYE: CPT

## 2019-07-30 PROCEDURE — 72125 CT NECK SPINE W/O DYE: CPT

## 2019-07-30 PROCEDURE — 99284 EMERGENCY DEPT VISIT MOD MDM: CPT

## 2019-07-30 RX ORDER — BACITRACIN, NEOMYCIN, POLYMYXIN B 400; 3.5; 5 [USP'U]/G; MG/G; [USP'U]/G
2 OINTMENT TOPICAL ONCE
Status: DISCONTINUED | OUTPATIENT
Start: 2019-07-30 | End: 2019-07-30 | Stop reason: HOSPADM

## 2019-07-30 NOTE — ED PROVIDER NOTES
History  Chief Complaint   Patient presents with    Fall     right arm injury, head injury     Patient is a 80-year-old male with a history of dementia frequent falls and does take Coumadin presents the emergency department after a fall from standing patient was found down patient denies any fall patient denies any pain he does have a hematoma on the right parietal scalp and skin tears on the right forearm and elbow and a small laceration over the right parietal scalp hematoma  Patient has full range of motion in all extremities denies any neck pain denies any loss of consciousness and frequently asks why a m  I here at the hospital and does not wish for any evaluation at this time and states that he is fine and he falls all the time  History provided by:  Patient and EMS personnel  Fall   Mechanism of injury: fall    Injury location:  Head/neck and shoulder/arm  Head/neck injury location:  Head and scalp  Shoulder/arm injury location:  R forearm  Arrived directly from scene: yes    Fall:     Fall occurred:  Walking    Point of impact:  Head and outstretched arms    Entrapped after fall: no    Suspicion of alcohol use: no    Suspicion of drug use: no    Tetanus status:  Up to date  Prior to arrival data:     Patient ambulatory at scene: yes      Responsiveness at scene:  Alert    Orientation at scene:  Person, place, situation and time    Loss of consciousness: no      Amnesic to event: no    Associated symptoms: no abdominal pain, no chest pain, no headaches, no nausea and no vomiting        Prior to Admission Medications   Prescriptions Last Dose Informant Patient Reported? Taking?    Memantine HCl ER (NAMENDA XR) 28 MG CP24  Family Member Yes No   Sig: Take 28 mg by mouth daily   acetaminophen (TYLENOL) 325 mg tablet  Family Member Yes No   Sig: Take 650 mg by mouth 2 (two) times a day     aspirin (ECOTRIN) 325 mg EC tablet   Yes No   Sig: Take 325 mg by mouth daily   atorvastatin (LIPITOR) 20 mg tablet Family Member Yes No   Sig: Take 20 mg by mouth daily at bedtime     bisacodyl (DULCOLAX) 10 mg suppository  Family Member Yes No   Sig: Insert 10 mg into the rectum every other day as needed   cholecalciferol (VITAMIN D3) 1,000 units tablet   Yes No   Sig: Take 1,000 Units by mouth daily   ergocalciferol (ERGOCALCIFEROL) 35194 units capsule   Yes No   Sig: Take 50,000 Units by mouth once a week   ferrous sulfate 325 (65 Fe) mg tablet  Family Member Yes No   Sig: Take 325 mg by mouth 2 (two) times a day with meals    furosemide (LASIX) 20 mg tablet  Family Member No No   Sig: Take 1 tablet (20 mg total) by mouth daily   lisinopril (ZESTRIL) 5 mg tablet  Family Member Yes No   Sig: Take 5 mg by mouth daily     magnesium hydroxide (MILK OF MAGNESIA) 400 mg/5 mL oral suspension   Yes No   Sig: Take 30 mL by mouth daily as needed for constipation   neomycin-bacitracin-polymyxin (NEOSPORIN) 5-400-5,000 ointment  Family Member Yes No   Sig: Apply topically 2 (two) times a day as needed (Skin tears)   rivastigmine (EXELON) 4 6 mg/24 hr TD 24 hr patch  Family Member Yes No   Sig: Place 1 patch on the skin   sertraline (ZOLOFT) 50 mg tablet  Family Member Yes No   Sig: Take 50 mg by mouth daily   warfarin (COUMADIN) 5 mg tablet  Family Member Yes No   Sig: Take 5 mg by mouth daily at bedtime       Facility-Administered Medications: None       Past Medical History:   Diagnosis Date    Anxiety     Dementia     DJD (degenerative joint disease)     Hyperlipidemia     Hypertension     Psychiatric disorder     anxiety depresion        History reviewed  No pertinent surgical history  History reviewed  No pertinent family history  I have reviewed and agree with the history as documented      Social History     Tobacco Use    Smoking status: Never Smoker    Smokeless tobacco: Never Used   Substance Use Topics    Alcohol use: No    Drug use: No        Review of Systems   Constitutional: Negative for activity change, appetite change, chills, fatigue and fever  HENT: Negative for congestion, ear pain, rhinorrhea and sore throat  Eyes: Negative for discharge, redness and visual disturbance  Respiratory: Negative for cough, chest tightness, shortness of breath and wheezing  Cardiovascular: Negative for chest pain and palpitations  Gastrointestinal: Negative for abdominal pain, constipation, diarrhea, nausea and vomiting  Endocrine: Negative for polydipsia and polyuria  Genitourinary: Negative for difficulty urinating, dysuria, frequency, hematuria and urgency  Musculoskeletal: Negative for arthralgias and myalgias  Skin: Positive for wound  Negative for color change, pallor and rash  Neurological: Negative for dizziness, weakness, light-headedness, numbness and headaches  Hematological: Negative for adenopathy  Does not bruise/bleed easily  All other systems reviewed and are negative  Physical Exam  Physical Exam   Constitutional: He is oriented to person, place, and time  He appears well-developed and well-nourished  HENT:   Head: Normocephalic  Head is with contusion and with laceration  Right Ear: External ear normal    Left Ear: External ear normal    Nose: Nose normal    Mouth/Throat: Oropharynx is clear and moist    Eyes: Pupils are equal, round, and reactive to light  Conjunctivae and EOM are normal    Neck: Normal range of motion  Neck supple  Cardiovascular: Normal rate, regular rhythm, normal heart sounds and intact distal pulses  Pulmonary/Chest: Effort normal and breath sounds normal  No respiratory distress  He has no wheezes  He has no rales  He exhibits no tenderness  Abdominal: Soft  Bowel sounds are normal  He exhibits no distension  There is no tenderness  There is no guarding  Musculoskeletal: Normal range of motion  Arms:  Neurological: He is alert and oriented to person, place, and time  No cranial nerve deficit or sensory deficit     Skin: Skin is warm and dry  Bruising noted  Psychiatric: He has a normal mood and affect  Nursing note and vitals reviewed  Vital Signs  ED Triage Vitals [07/30/19 1632]   Temperature Pulse Respirations Blood Pressure SpO2   97 6 °F (36 4 °C) (!) 50 20 (!) 188/87 98 %      Temp Source Heart Rate Source Patient Position - Orthostatic VS BP Location FiO2 (%)   Temporal Monitor Sitting Left arm --      Pain Score       No Pain           Vitals:    07/30/19 1632   BP: (!) 188/87   Pulse: (!) 50   Patient Position - Orthostatic VS: Sitting         Visual Acuity  Visual Acuity      Most Recent Value   L Pupil Size (mm)  3   R Pupil Size (mm)  3          ED Medications  Medications   neomycin-bacitracin-polymyxin b (NEOSPORIN) ointment 2 small application (has no administration in time range)       Diagnostic Studies  Results Reviewed     None                 CT spine cervical without contrast   Final Result by Josiane Josue MD (07/30 1710)      No cervical spine fracture or traumatic malalignment  Chronic multilevel degenerative disc disease and facet arthropathy  Workstation performed: [id number]         CT head without contrast   Final Result by Josiane Josue MD (07/30 1706)      No acute intracranial abnormality  Microangiopathic changes  Right-sided scalp hematoma  No underlying skull fracture                    Workstation performed: [id number]                    Procedures  Procedures       ED Course                               MDM  Number of Diagnoses or Management Options  Contusion of scalp, initial encounter: new and requires workup  Fall, initial encounter: new and requires workup  Skin avulsion: new and requires workup  Diagnosis management comments: Wounds cleaned and dressed in the emergency department no wounds requiring laceration repair with sutures or staples as all wounds are superficial there dressed with antibiotic ointment and nonadherent gauze pads and gauze roll patient's tetanus is up-to-date no acute intracranial traumatic injury or acute fracture to the cervical spine on imaging in the ED which was performed due to the patient being on Coumadin and having history of dementia  Patient's cervical spine is subsequently cleared he is advised rest and fall precautions and follow up with PCP for wound check and further wound care for multiple skin tears and further evaluation and management return precautions and anticipatory guidance discussed  Amount and/or Complexity of Data Reviewed  Tests in the radiology section of CPT®: reviewed and ordered  Decide to obtain previous medical records or to obtain history from someone other than the patient: yes  Review and summarize past medical records: yes  Independent visualization of images, tracings, or specimens: yes    Risk of Complications, Morbidity, and/or Mortality  Presenting problems: moderate  Diagnostic procedures: moderate  Management options: moderate    Patient Progress  Patient progress: stable      Disposition  Final diagnoses:   Fall, initial encounter   Contusion of scalp, initial encounter   Skin avulsion     Time reflects when diagnosis was documented in both MDM as applicable and the Disposition within this note     Time User Action Codes Description Comment    7/30/2019  5:06 PM Mily Barkley Add [W19  SCCO] Fall, initial encounter     7/30/2019  5:06 PM Kavya Martínez Add [S00 [id number]] Contusion of scalp, initial encounter     7/30/2019  5:06 PM Sami Gibbons  8XXA] Skin avulsion       ED Disposition     ED Disposition Condition Date/Time Comment    Discharge Stable Tue Jul 30, 2019  5:06 PM Roberto Failing discharge to home/self care              Follow-up Information     Follow up With Specialties Details Why [address], [address] [facility], Nurse Practitioner Schedule an appointment as soon as possible for a visit in 3 days  [address] [address]  [phone] Patient's Medications   Discharge Prescriptions    No medications on file     No discharge procedures on file      ED Provider  Electronically Signed by           Joy Swanson DO  07/30/19 8655

## 2019-08-12 ENCOUNTER — APPOINTMENT (EMERGENCY)
Dept: CT IMAGING | Facility: HOSPITAL | Age: 84
End: 2019-08-12
Payer: COMMERCIAL

## 2019-08-12 ENCOUNTER — APPOINTMENT (EMERGENCY)
Dept: RADIOLOGY | Facility: HOSPITAL | Age: 84
End: 2019-08-12
Payer: COMMERCIAL

## 2019-08-12 ENCOUNTER — HOSPITAL ENCOUNTER (EMERGENCY)
Facility: HOSPITAL | Age: 84
Discharge: HOME | End: 2019-08-12
Payer: COMMERCIAL

## 2019-08-12 VITALS
TEMPERATURE: 97 F | HEART RATE: 56 BPM | HEIGHT: 66 IN | SYSTOLIC BLOOD PRESSURE: 196 MMHG | WEIGHT: 179.9 LBS | DIASTOLIC BLOOD PRESSURE: 99 MMHG | BODY MASS INDEX: 28.91 KG/M2 | RESPIRATION RATE: 18 BRPM | OXYGEN SATURATION: 97 %

## 2019-08-12 DIAGNOSIS — S09.90XD CLOSED HEAD INJURY, SUBSEQUENT ENCOUNTER: ICD-10-CM
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
## 2019-08-12 DIAGNOSIS — S70.00XA CONTUSION OF HIP: ICD-10-CM

## 2019-08-12 DIAGNOSIS — S40.012A CONTUSION OF LEFT SHOULDER, INITIAL ENCOUNTER: ICD-10-CM

## 2019-08-12 PROBLEM — S09.90XA CLOSED HEAD INJURY: Status: ACTIVE | Noted: 2019-08-12

## 2019-08-12 LAB
ALBUMIN SERPL BCP-MCNC: 3.8 G/DL (ref 3.5–5.7)
ALP SERPL-CCNC: 94 U/L (ref 55–165)
ALT SERPL W P-5'-P-CCNC: 13 U/L (ref 7–52)
ANION GAP SERPL CALCULATED.3IONS-SCNC: 9 MMOL/L (ref 4–13)
APTT PPP: 32 SECONDS (ref 23–37)
AST SERPL W P-5'-P-CCNC: 18 U/L (ref 13–39)
ATRIAL RATE: 357 BPM
BASOPHILS # BLD AUTO: 0 THOUSANDS/ΜL (ref 0–0.1)
BASOPHILS NFR BLD AUTO: 0 % (ref 0–2)
BILIRUB SERPL-MCNC: 0.7 MG/DL (ref 0.2–1)
BUN SERPL-MCNC: 39 MG/DL (ref 7–25)
CALCIUM SERPL-MCNC: 9.3 MG/DL (ref 8.6–10.5)
CHLORIDE SERPL-SCNC: 105 MMOL/L (ref 98–107)
CK SERPL-CCNC: 47 U/L (ref 30–308)
CO2 SERPL-SCNC: 27 MMOL/L (ref 21–31)
CREAT SERPL-MCNC: 1.84 MG/DL (ref 0.7–1.3)
EOSINOPHIL # BLD AUTO: 0.2 THOUSAND/ΜL (ref 0–0.61)
EOSINOPHIL NFR BLD AUTO: 4 % (ref 0–5)
ERYTHROCYTE [DISTWIDTH] IN BLOOD BY AUTOMATED COUNT: 15.1 % (ref 11.5–14.5)
GFR SERPL CREATININE-BSD FRML MDRD: 31 ML/MIN/1.73SQ M
GLUCOSE SERPL-MCNC: 89 MG/DL (ref 65–140)
GLUCOSE SERPL-MCNC: 92 MG/DL (ref 65–99)
HCT VFR BLD AUTO: 38.8 % (ref 42–47)
HGB BLD-MCNC: 12.6 G/DL (ref 14–18)
INR PPP: 1.57 (ref 0.9–1.5)
LYMPHOCYTES # BLD AUTO: 1 THOUSANDS/ΜL (ref 0.6–4.47)
LYMPHOCYTES NFR BLD AUTO: 15 % (ref 21–51)
MCH RBC QN AUTO: 31 PG (ref 26–34)
MCHC RBC AUTO-ENTMCNC: 32.5 G/DL (ref 31–37)
MCV RBC AUTO: 95 FL (ref 81–99)
MONOCYTES # BLD AUTO: 0.7 THOUSAND/ΜL (ref 0.17–1.22)
MONOCYTES NFR BLD AUTO: 11 % (ref 2–12)
NEUTROPHILS # BLD AUTO: 4.6 THOUSANDS/ΜL (ref 1.4–6.5)
NEUTS SEG NFR BLD AUTO: 69 % (ref 42–75)
PLATELET # BLD AUTO: 178 THOUSANDS/UL (ref 149–390)
PMV BLD AUTO: 9.6 FL (ref 8.6–11.7)
POTASSIUM SERPL-SCNC: 4.7 MMOL/L (ref 3.5–5.5)
PROT SERPL-MCNC: 7 G/DL (ref 6.4–8.9)
PROTHROMBIN TIME: 18.3 SECONDS (ref 10.2–13)
QRS AXIS: -33 DEGREES
QRSD INTERVAL: 78 MS
QT INTERVAL: 466 MS
QTC INTERVAL: 441 MS
RBC # BLD AUTO: 4.07 MILLION/UL (ref 4.3–5.9)
SODIUM SERPL-SCNC: 141 MMOL/L (ref 134–143)
T WAVE AXIS: 25 DEGREES
VENTRICULAR RATE: 54 BPM
WBC # BLD AUTO: 6.6 THOUSAND/UL (ref 4.8–10.8)

## 2019-08-12 PROCEDURE — 96372 THER/PROPH/DIAG INJ SC/IM: CPT

## 2019-08-12 PROCEDURE — 93005 ELECTROCARDIOGRAM TRACING: CPT

## 2019-08-12 PROCEDURE — 36415 COLL VENOUS BLD VENIPUNCTURE: CPT

## 2019-08-12 PROCEDURE — 82550 ASSAY OF CK (CPK): CPT

## 2019-08-12 PROCEDURE — 85025 COMPLETE CBC W/AUTO DIFF WBC: CPT

## 2019-08-12 PROCEDURE — 72125 CT NECK SPINE W/O DYE: CPT

## 2019-08-12 PROCEDURE — 73090 X-RAY EXAM OF FOREARM: CPT

## 2019-08-12 PROCEDURE — 85730 THROMBOPLASTIN TIME PARTIAL: CPT

## 2019-08-12 PROCEDURE — 85610 PROTHROMBIN TIME: CPT

## 2019-08-12 PROCEDURE — 73030 X-RAY EXAM OF SHOULDER: CPT

## 2019-08-12 PROCEDURE — 82948 REAGENT STRIP/BLOOD GLUCOSE: CPT

## 2019-08-12 PROCEDURE — 73200 CT UPPER EXTREMITY W/O DYE: CPT

## 2019-08-12 PROCEDURE — 73521 X-RAY EXAM HIPS BI 2 VIEWS: CPT

## 2019-08-12 PROCEDURE — 70450 CT HEAD/BRAIN W/O DYE: CPT

## 2019-08-12 PROCEDURE — 99285 EMERGENCY DEPT VISIT HI MDM: CPT

## 2019-08-12 PROCEDURE — 93010 ELECTROCARDIOGRAM REPORT: CPT | Performed by: INTERNAL MEDICINE

## 2019-08-12 PROCEDURE — 80053 COMPREHEN METABOLIC PANEL: CPT

## 2019-08-12 RX ORDER — HYDRALAZINE HYDROCHLORIDE 20 MG/ML
20 INJECTION INTRAMUSCULAR; INTRAVENOUS ONCE
Status: COMPLETED | OUTPATIENT
Start: 2019-08-12 | End: 2019-08-12

## 2019-08-12 RX ADMIN — HYDRALAZINE HYDROCHLORIDE 20 MG: 20 INJECTION INTRAMUSCULAR; INTRAVENOUS at 09:58

## 2019-08-12 NOTE — ED PROVIDER NOTES
History  Chief Complaint   Patient presents with    Fall     Blanuqita Chow is a 70-year-old male who was brought to the emergency department by ambulance crew from a local nursing home facility after an episode of fall today  When the nursing person home from the local nursing facility saw him they found him on the floor of his room  Patient denies loss of consciousness  Patient sustained multiple ecchymosis on the head and face as well as left shoulder and left arm  He also sustained skin tear on the left hand and left on forearm      History provided by:  Patient, EMS personnel and nursing home   used: No    Fall   Mechanism of injury: fall    Injury location:  Head/neck and shoulder/arm  Shoulder/arm injury location:  L shoulder, L forearm and R forearm  Incident location:  group home  Time since incident: Unable to specify    Arrived directly from scene: yes    Fall:     Fall occurred:  Unable to specify    Height of fall:  Unable to specify    Impact surface:  Unable to specify    Point of impact:  Unable to specify    Entrapped after fall: no    Protective equipment: none    Suspicion of alcohol use: no    Suspicion of drug use: no    Tetanus status:  Up to date  Prior to arrival data:     Bystander interventions:  None    Patient ambulatory at scene: no      Blood loss:  None    Responsiveness at scene:  Alert    Orientation at scene:  Person, place, situation and time    Loss of consciousness: no      Amnesic to event: no      Airway interventions:  None    Breathing interventions:  None    IV access status:  None    IO access:  None    Fluids administered:  None    Cardiac interventions:  None    Medications administered:  None    Immobilization:  None    Airway condition since incident:  Stable    Breathing condition since incident:  Stable    Circulation condition since incident:  Stable    Mental status condition since incident:  Stable    Disability condition since incident: Stable  Associated symptoms: no abdominal pain, no back pain, no blindness, no chest pain, no difficulty breathing, no headaches, no hearing loss, no loss of consciousness, no nausea, no neck pain, no seizures and no vomiting        Prior to Admission Medications   Prescriptions Last Dose Informant Patient Reported? Taking?    Memantine HCl ER (NAMENDA XR) 28 MG CP24  Family Member Yes No   Sig: Take 28 mg by mouth daily   acetaminophen (TYLENOL) 325 mg tablet  Family Member Yes No   Sig: Take 650 mg by mouth 2 (two) times a day     aspirin (ECOTRIN) 325 mg EC tablet   Yes No   Sig: Take 325 mg by mouth daily   atorvastatin (LIPITOR) 20 mg tablet  Family Member Yes No   Sig: Take 20 mg by mouth daily at bedtime     bisacodyl (DULCOLAX) 10 mg suppository  Family Member Yes No   Sig: Insert 10 mg into the rectum every other day as needed   cholecalciferol (VITAMIN D3) 1,000 units tablet   Yes No   Sig: Take 1,000 Units by mouth daily   ergocalciferol (ERGOCALCIFEROL) 04071 units capsule   Yes No   Sig: Take 50,000 Units by mouth once a week   ferrous sulfate 325 (65 Fe) mg tablet  Family Member Yes No   Sig: Take 325 mg by mouth 2 (two) times a day with meals    furosemide (LASIX) 20 mg tablet  Family Member No No   Sig: Take 1 tablet (20 mg total) by mouth daily   lisinopril (ZESTRIL) 5 mg tablet  Family Member Yes No   Sig: Take 5 mg by mouth daily     magnesium hydroxide (MILK OF MAGNESIA) 400 mg/5 mL oral suspension   Yes No   Sig: Take 30 mL by mouth daily as needed for constipation   neomycin-bacitracin-polymyxin (NEOSPORIN) 5-400-5,000 ointment  Family Member Yes No   Sig: Apply topically 2 (two) times a day as needed (Skin tears)   rivastigmine (EXELON) 4 6 mg/24 hr TD 24 hr patch  Family Member Yes No   Sig: Place 1 patch on the skin   sertraline (ZOLOFT) 50 mg tablet  Family Member Yes No   Sig: Take 50 mg by mouth daily   warfarin (COUMADIN) 5 mg tablet  Family Member Yes No   Sig: Take 5 mg by mouth daily at bedtime       Facility-Administered Medications: None       Past Medical History:   Diagnosis Date    Anxiety     Dementia     DJD (degenerative joint disease)     Hyperlipidemia     Hypertension     Psychiatric disorder     anxiety depresion        History reviewed  No pertinent surgical history  History reviewed  No pertinent family history  I have reviewed and agree with the history as documented  Social History     Tobacco Use    Smoking status: Never Smoker    Smokeless tobacco: Never Used   Substance Use Topics    Alcohol use: No    Drug use: No        Review of Systems   Constitutional: Negative  HENT: Negative for hearing loss  Eyes: Negative  Negative for blindness  Respiratory: Negative  Cardiovascular: Negative for chest pain  Gastrointestinal: Negative for abdominal pain, nausea and vomiting  Endocrine: Negative  Genitourinary: Negative  Musculoskeletal: Positive for arthralgias  Negative for back pain and neck pain  Skin: Negative  Allergic/Immunologic: Negative  Neurological: Negative for seizures, loss of consciousness and headaches  Hematological: Negative  Psychiatric/Behavioral: Negative  Physical Exam  Physical Exam   Constitutional: He is oriented to person, place, and time  He appears well-developed and well-nourished  No distress  HENT:   Head: Normocephalic  Right Ear: External ear normal    Left Ear: External ear normal    Nose: Nose normal    Mouth/Throat: Oropharynx is clear and moist  No oropharyngeal exudate  Eyes: Pupils are equal, round, and reactive to light  Conjunctivae and EOM are normal  Right eye exhibits no discharge  Left eye exhibits no discharge  No scleral icterus  Neck: Normal range of motion  Neck supple  No tracheal deviation present  No thyromegaly present  Cardiovascular: Normal rate and regular rhythm  Pulmonary/Chest: Effort normal and breath sounds normal    Abdominal: Soft   Bowel sounds are normal  He exhibits no distension  Musculoskeletal: Normal range of motion  He exhibits no edema, tenderness or deformity  Lymphadenopathy:     He has no cervical adenopathy  Neurological: He is alert and oriented to person, place, and time  No cranial nerve deficit or sensory deficit  He exhibits normal muscle tone  Coordination normal    Skin: Skin is warm and dry  He is not diaphoretic  Multiple skin tears on both forearms  Psychiatric: He has a normal mood and affect  His behavior is normal  Judgment and thought content normal    Nursing note and vitals reviewed        Vital Signs  ED Triage Vitals [08/12/19 0742]   Temperature Pulse Respirations Blood Pressure SpO2   (!) 97 °F (36 1 °C) 56 18 (!) 202/86 97 %      Temp Source Heart Rate Source Patient Position - Orthostatic VS BP Location FiO2 (%)   Temporal Monitor Lying Left arm --      Pain Score       No Pain           Vitals:    08/12/19 0742 08/12/19 0922   BP: (!) 202/86 (!) 196/99   Pulse: 56    Patient Position - Orthostatic VS: Lying Lying         Visual Acuity      ED Medications  Medications   hydrALAZINE (APRESOLINE) injection 20 mg (20 mg Intramuscular Given 8/12/19 0958)       Diagnostic Studies  Results Reviewed     Procedure Component Value Units Date/Time    Comprehensive metabolic panel [[id number]]  (Abnormal) Collected:  08/12/19 0917    Lab Status:  Final result Specimen:  Blood from Arm, Left Updated:  08/12/19 0946     Sodium 141 mmol/L      Potassium 4 7 mmol/L      Chloride 105 mmol/L      CO2 27 mmol/L      ANION GAP 9 mmol/L      BUN 39 mg/dL      Creatinine 1 84 mg/dL      Glucose 92 mg/dL      Calcium 9 3 mg/dL      AST 18 U/L      ALT 13 U/L      Alkaline Phosphatase 94 U/L      Total Protein 7 0 g/dL      Albumin 3 8 g/dL      Total Bilirubin 0 70 mg/dL      eGFR 31 ml/min/1 73sq m     Narrative:       [place] guidelines for Chronic Kidney Disease (CKD):     Stage 1 with normal or high GFR (GFR > 90 mL/min/1 73 square meters)    Stage 2 Mild CKD (GFR = 60-89 mL/min/1 73 square meters)    Stage 3A Moderate CKD (GFR = 45-59 mL/min/1 73 square meters)    Stage 3B Moderate CKD (GFR = 30-44 mL/min/1 73 square meters)    Stage 4 Severe CKD (GFR = 15-29 mL/min/1 73 square meters)    Stage 5 End Stage CKD (GFR <15 mL/min/1 73 square meters)  Note: GFR calculation is accurate only with a steady state creatinine    CK Total with Reflex CKMB [[id number]]  (Normal) Collected:  08/12/19 0917    Lab Status:  Final result Specimen:  Blood from Arm, Left Updated:  08/12/19 0945     Total CK 47 U/L     Protime-INR [[id number]]  (Abnormal) Collected:  08/12/19 0803    Lab Status:  Final result Specimen:  Blood from Arm, Left Updated:  08/12/19 0836     Protime 18 3 seconds      INR 1 57    APTT [[id number]]  (Normal) Collected:  08/12/19 0803    Lab Status:  Final result Specimen:  Blood from Arm, Left Updated:  08/12/19 0836     PTT 32 seconds     Fingerstick Glucose (POCT) [[id number]]  (Normal) Collected:  08/12/19 0751    Lab Status:  Final result Updated:  08/12/19 0816     POC Glucose 89 mg/dl     CBC and differential [[id number]]  (Abnormal) Collected:  08/12/19 0803    Lab Status:  Final result Specimen:  Blood from Arm, Left Updated:  08/12/19 0810     WBC 6 60 Thousand/uL      RBC 4 07 Million/uL      Hemoglobin 12 6 g/dL      Hematocrit 38 8 %      MCV 95 fL      MCH 31 0 pg      MCHC 32 5 g/dL      RDW 15 1 %      MPV 9 6 fL      Platelets 625 Thousands/uL      Neutrophils Relative 69 %      Lymphocytes Relative 15 %      Monocytes Relative 11 %      Eosinophils Relative 4 %      Basophils Relative 0 %      Neutrophils Absolute 4 60 Thousands/µL      Lymphocytes Absolute 1 00 Thousands/µL      Monocytes Absolute 0 70 Thousand/µL      Eosinophils Absolute 0 20 Thousand/µL      Basophils Absolute 0 00 Thousands/µL                  CT shoulder left wo contrast   Final Result by Yessy Arellano MD (08/12 9688) 1   No acute posttraumatic injury  No fracture dislocation  2   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 including fatty muscular infiltration of the muscle belly, superior humeral head migration of the undersurface remodeling of a broad-based subacromial spur  3   Advanced glenohumeral degenerative osteoarthritis noted  Workstation performed: [id number]         XR shoulder 2+ views LEFT   ED Interpretation by Yaritza Reyes MD (08/12 8284)   Lucency along the humeral head which is concerning for possible fracture  XR forearm 2 views RIGHT   ED Interpretation by Yaritza Reyes MD (08/12 2156)   No fracture      XR forearm 2 views LEFT   ED Interpretation by Yaritza Reyes MD (08/12 1394)   No fracture      XR hips bilateral with ap pelvis 2 vw   ED Interpretation by Yaritza Reyes MD (08/12 7419)   Bilateral hip prostheses  Hardware intact  CT cervical spine without contrast   Final Result by Baltazar Chávez MD (08/12 4446)      No cervical spine fracture or traumatic malalignment  Multilevel degenerative changes in the cervical spine  Workstation performed: [id number]         CT head without contrast   Final Result by Baltazar Chávez MD (08/12 6229)      No acute intracranial abnormality  Microangiopathic changes                    Workstation performed: [id number]                    Procedures  ECG 12 Lead Documentation Only  Date/Time: 8/12/2019 8:20 AM  Performed by: Yaritza Reyes MD  Authorized by: Yaritza Reyes MD     Indications / Diagnosis:  Fall  ECG reviewed by me, the ED Provider: yes    Patient location:  ED  Previous ECG:     Previous ECG:  Unavailable    Comparison to cardiac monitor: No    Interpretation:     Interpretation: abnormal    Rate:     ECG rate:  54 beats per minute    ECG rate assessment: bradycardic    Rhythm:     Rhythm: atrial fibrillation    Ectopy:     Ectopy: none    QRS:     QRS axis:  Left    QRS intervals: Normal  Conduction:     Conduction: normal    ST segments:     ST segments:  Non-specific  T waves:     T waves: non-specific             ED Course  ED Course as of Aug 12 1026   Mon Aug 12, 2019   1021 I discussed with the patient the results of his blood work and imaging studies  Patient wants to go back to the personal care home  MDM  Number of Diagnoses or Management Options  Closed head injury, subsequent encounter: new and requires workup  Contusion of hip: new and requires workup  Contusion of left shoulder, initial encounter: new and requires workup  Fall, subsequent encounter: new and requires workup     Amount and/or Complexity of Data Reviewed  Clinical lab tests: ordered and reviewed  Tests in the radiology section of CPT®: ordered and reviewed  Tests in the medicine section of CPT®: ordered and reviewed  Decide to obtain previous medical records or to obtain history from someone other than the patient: yes  Obtain history from someone other than the patient: yes  Review and summarize past medical records: yes  Independent visualization of images, tracings, or specimens: yes    Risk of Complications, Morbidity, and/or Mortality  Presenting problems: moderate  Diagnostic procedures: moderate  Management options: moderate    Patient Progress  Patient progress: stable      Disposition  Final diagnoses:   Fall, subsequent encounter   Closed head injury, subsequent encounter   Contusion of left shoulder, initial encounter   Contusion of hip     Time reflects when diagnosis was documented in both MDM as applicable and the Disposition within this note     Time User Action Codes Description Comment    8/12/2019 10:23 AM Kimberly Toscano  XXXD] Fall, subsequent encounter     8/12/2019 10:23 AM Kimberly Carbajal [S09 90XD] Closed head injury, subsequent encounter     8/12/2019 10:24 AM Stewart Cates Add [S40 012A] Contusion of left shoulder, initial encounter     8/12/2019 10:24 AM Isamar Verduzco Add [S70 00XA] Contusion of hip       ED Disposition     ED Disposition Condition Date/Time Comment    Discharge Stable Mon Aug 12, 2019 10:23 AM Kj Butler discharge to home/self care  Follow-up Information     Follow up With Specialties Details Why [address], [address] [facility], Nurse Practitioner In 3 days  [address]  [place] [facility] [address]  [phone]            Patient's Medications   Discharge Prescriptions    No medications on file     No discharge procedures on file      ED Provider  Electronically Signed by           Gio Dwyer MD  08/12/19 2905 [facility] Sury Santizo MD  08/12/19 5538

## 2019-08-18 ENCOUNTER — HOSPITAL ENCOUNTER (EMERGENCY)
Facility: HOSPITAL | Age: 84
Discharge: HOME | End: 2019-08-18
Attending: EMERGENCY MEDICINE | Admitting: EMERGENCY MEDICINE
Payer: COMMERCIAL

## 2019-08-18 ENCOUNTER — APPOINTMENT (EMERGENCY)
Dept: CT IMAGING | Facility: HOSPITAL | Age: 84
End: 2019-08-18
Payer: COMMERCIAL

## 2019-08-18 VITALS
HEART RATE: 55 BPM | DIASTOLIC BLOOD PRESSURE: 82 MMHG | SYSTOLIC BLOOD PRESSURE: 191 MMHG | OXYGEN SATURATION: 94 % | BODY MASS INDEX: 28.91 KG/M2 | HEIGHT: 66 IN | WEIGHT: 179.9 LBS | RESPIRATION RATE: 16 BRPM | TEMPERATURE: 96.7 F

## 2019-08-18 DIAGNOSIS — S50.811A ABRASION OF RIGHT FOREARM, INITIAL ENCOUNTER: ICD-10-CM
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
## 2019-08-18 DIAGNOSIS — S09.90XA INJURY OF HEAD, INITIAL ENCOUNTER: ICD-10-CM

## 2019-08-18 PROCEDURE — 70450 CT HEAD/BRAIN W/O DYE: CPT

## 2019-08-18 PROCEDURE — 99284 EMERGENCY DEPT VISIT MOD MDM: CPT

## 2019-08-18 RX ORDER — GINSENG 100 MG
1 CAPSULE ORAL ONCE
Status: COMPLETED | OUTPATIENT
Start: 2019-08-18 | End: 2019-08-18

## 2019-08-18 RX ADMIN — BACITRACIN ZINC 1 SMALL APPLICATION: 500 OINTMENT TOPICAL at 16:36

## 2019-08-18 NOTE — ED NOTES
Wound rt elbow cleased and dressed, bacitracin, adaptic, 4x4 gauze, 3inch cling     Ian Guido RN  08/18/19 5178

## 2019-08-18 NOTE — ED NOTES
Pt reports he has to urinate, wants to walk to restroom  Informed pt that he has already fallen numerous time and we cannot let him walk to the restroom    Helped pt pull his pants down, placed urinal between his legs     Laura Valencia RN  08/18/19 1907

## 2019-08-18 NOTE — ED PROVIDER NOTES
History  Chief Complaint   Patient presents with    Fall     unwitnessed by staff, unsure if hit head, patient denies any pain      Nursing home staff found patient on the ground and called EMS believing that he had fallen  Patient has dementia and is not a good historian, denies pain currently  Patient has chronic falls  History provided by:  Patient  Fall   Mechanism of injury: fall    Injury location:  Head/neck and shoulder/arm  Shoulder/arm injury location:  R forearm  Incident location:  Nursing home  Arrived directly from scene: yes    Fall:     Fall occurred:  Walking    Point of impact:  Unable to specify  Prior to arrival data:     Responsiveness at scene:  Alert    Loss of consciousness: Unknown  Associated symptoms: no abdominal pain, no back pain, no chest pain, no difficulty breathing, no headaches, no nausea, no neck pain and no vomiting        Prior to Admission Medications   Prescriptions Last Dose Informant Patient Reported? Taking?    Memantine HCl ER (NAMENDA XR) 28 MG CP24  Family Member Yes No   Sig: Take 28 mg by mouth daily   acetaminophen (TYLENOL) 325 mg tablet  Family Member Yes No   Sig: Take 650 mg by mouth 2 (two) times a day     aspirin (ECOTRIN) 325 mg EC tablet   Yes No   Sig: Take 325 mg by mouth daily   atorvastatin (LIPITOR) 20 mg tablet  Family Member Yes No   Sig: Take 20 mg by mouth daily at bedtime     bisacodyl (DULCOLAX) 10 mg suppository  Family Member Yes No   Sig: Insert 10 mg into the rectum every other day as needed   cholecalciferol (VITAMIN D3) 1,000 units tablet   Yes No   Sig: Take 1,000 Units by mouth daily   ergocalciferol (ERGOCALCIFEROL) 12945 units capsule   Yes No   Sig: Take 50,000 Units by mouth once a week   ferrous sulfate 325 (65 Fe) mg tablet  Family Member Yes No   Sig: Take 325 mg by mouth 2 (two) times a day with meals    furosemide (LASIX) 20 mg tablet  Family Member No No   Sig: Take 1 tablet (20 mg total) by mouth daily   lisinopril (ZESTRIL) 5 mg tablet  Family Member Yes No   Sig: Take 5 mg by mouth daily     magnesium hydroxide (MILK OF MAGNESIA) 400 mg/5 mL oral suspension   Yes No   Sig: Take 30 mL by mouth daily as needed for constipation   neomycin-bacitracin-polymyxin (NEOSPORIN) 5-400-5,000 ointment  Family Member Yes No   Sig: Apply topically 2 (two) times a day as needed (Skin tears)   rivastigmine (EXELON) 4 6 mg/24 hr TD 24 hr patch  Family Member Yes No   Sig: Place 1 patch on the skin   sertraline (ZOLOFT) 50 mg tablet  Family Member Yes No   Sig: Take 50 mg by mouth daily   warfarin (COUMADIN) 5 mg tablet  Family Member Yes No   Sig: Take 5 mg by mouth daily at bedtime       Facility-Administered Medications: None       Past Medical History:   Diagnosis Date    Anxiety     Dementia     DJD (degenerative joint disease)     Hyperlipidemia     Hypertension     Psychiatric disorder     anxiety depresion        History reviewed  No pertinent surgical history  History reviewed  No pertinent family history  I have reviewed and agree with the history as documented  Social History     Tobacco Use    Smoking status: Never Smoker    Smokeless tobacco: Never Used   Substance Use Topics    Alcohol use: No    Drug use: No        Review of Systems   Constitutional: Negative for chills and fever  HENT: Negative for congestion, rhinorrhea and sore throat  Eyes: Negative for visual disturbance  Respiratory: Negative for cough and shortness of breath  Cardiovascular: Negative for chest pain  Gastrointestinal: Negative for abdominal pain, diarrhea, nausea and vomiting  Genitourinary: Negative for dysuria  Musculoskeletal: Negative for back pain and neck pain  Skin: Positive for wound  Negative for rash  Neurological: Negative for weakness and headaches  Physical Exam  Physical Exam   Constitutional: He appears well-developed and well-nourished  HENT:   Head: Normocephalic     Right Ear: External ear normal    Left Ear: External ear normal    Nose: Nose normal    Mouth/Throat: Oropharynx is clear and moist    Small ecchymosis on right frontal parietal region   Eyes: Pupils are equal, round, and reactive to light  Conjunctivae and EOM are normal    Neck: Normal range of motion  Neck supple  C-spine nontender   Cardiovascular: Normal rate, regular rhythm, normal heart sounds and intact distal pulses  Pulmonary/Chest: Effort normal and breath sounds normal    Abdominal: Soft  Bowel sounds are normal  There is no tenderness  Musculoskeletal: Normal range of motion  He exhibits no tenderness  L spine nontender, right forearm skin tear approximately 3 cm, nontender   Neurological: He is alert  No cranial nerve deficit  Skin: Skin is warm and dry  Capillary refill takes less than 2 seconds  No rash noted  Psychiatric: He has a normal mood and affect  Nursing note and vitals reviewed  Vital Signs  ED Triage Vitals [08/18/19 1621]   Temperature Pulse Respirations Blood Pressure SpO2   (!) 96 7 °F (35 9 °C) 55 16 130/63 98 %      Temp Source Heart Rate Source Patient Position - Orthostatic VS BP Location FiO2 (%)   Tympanic Monitor Sitting Left arm --      Pain Score       No Pain           Vitals:    08/18/19 1621   BP: 130/63   Pulse: 55   Patient Position - Orthostatic VS: Sitting         Visual Acuity      ED Medications  Medications   bacitracin topical ointment 1 small application (1 small application Topical Given 8/18/19 1636)       Diagnostic Studies  Results Reviewed     None                 CT head without contrast   Final Result by Jazmin Whyte MD (08/18 1732)         1  No acute intracranial hemorrhage, mass effect or edema  2   Mild, chronic microangiopathy     3   Persistent ventriculomegaly                  Workstation performed: [id number]                    Procedures  Procedures       ED Course                               MDM    Disposition  Final diagnoses:   Fall, initial encounter   Abrasion of right forearm, initial encounter   Injury of head, initial encounter     Time reflects when diagnosis was documented in both MDM as applicable and the Disposition within this note     Time User Action Codes Description Comment    8/18/2019  5:48 PM Aguilar Esters Add [C32  HZBF] Fall, initial encounter     8/18/2019  5:48 PM Aguilar Esters Add [F03 259Y] Abrasion of right forearm, initial encounter     8/18/2019  5:48 PM Aguilar Esters Add [S09 90XA] Injury of head, initial encounter       ED Disposition     ED Disposition Condition Date/Time Comment    Discharge Stable Sun Aug 18, 2019  5:48 PM Yoselin Mcdaniel discharge to home/self care  Follow-up Information     Follow up With Specialties Details Why [address], [address] [facility], Nurse Practitioner Call  If symptoms worsen [address] [place] [address]  [phone]            Patient's Medications   Discharge Prescriptions    No medications on file     No discharge procedures on file      ED Provider  Electronically Signed by           Shante Wilhelm PA-C  08/18/19 [phone]

## 2019-09-07 ENCOUNTER — APPOINTMENT (EMERGENCY)
Dept: CT IMAGING | Facility: HOSPITAL | Age: 84
End: 2019-09-07
Payer: COMMERCIAL

## 2019-09-07 ENCOUNTER — HOSPITAL ENCOUNTER (EMERGENCY)
Facility: HOSPITAL | Age: 84
Discharge: HOME | End: 2019-09-08
Attending: EMERGENCY MEDICINE
Payer: COMMERCIAL

## 2019-09-07 VITALS
RESPIRATION RATE: 16 BRPM | BODY MASS INDEX: 29.05 KG/M2 | OXYGEN SATURATION: 99 % | SYSTOLIC BLOOD PRESSURE: 151 MMHG | WEIGHT: 180.78 LBS | HEIGHT: 66 IN | DIASTOLIC BLOOD PRESSURE: 70 MMHG | HEART RATE: 55 BPM | TEMPERATURE: 97 F
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
## 2019-09-07 DIAGNOSIS — S09.90XA INJURY OF HEAD, INITIAL ENCOUNTER: ICD-10-CM

## 2019-09-07 PROCEDURE — 72125 CT NECK SPINE W/O DYE: CPT

## 2019-09-07 PROCEDURE — 99284 EMERGENCY DEPT VISIT MOD MDM: CPT

## 2019-09-07 PROCEDURE — 70450 CT HEAD/BRAIN W/O DYE: CPT

## 2019-09-07 NOTE — ED PROVIDER NOTES
History  Chief Complaint   Patient presents with    Fall     fall from standing position, skin tear on the right hand, hit head -LOC      This is a pleasant 35-year-old male who resides in a nursing facility and [facility] [address]  The patient has frequent falls  Patient has been seen numerous times and tolerated hospital for these  Patient was in his wheelchair today and leaned forward and fell on the struck th eleft  temple area of his head  Patient is on anticoagulants  Patient denies any loss of consciousness  Patient denies any pain  The patient also has skin tears of the right forearm  Patient denies any dysuria  Denies any headache  Denies any fever chills  Prior to Admission Medications   Prescriptions Last Dose Informant Patient Reported? Taking?    Memantine HCl ER (NAMENDA XR) 28 MG CP24  Family Member Yes No   Sig: Take 28 mg by mouth daily   acetaminophen (TYLENOL) 325 mg tablet  Family Member Yes No   Sig: Take 650 mg by mouth 2 (two) times a day     aspirin (ECOTRIN) 325 mg EC tablet   Yes No   Sig: Take 325 mg by mouth daily   atorvastatin (LIPITOR) 20 mg tablet  Family Member Yes No   Sig: Take 20 mg by mouth daily at bedtime     bisacodyl (DULCOLAX) 10 mg suppository  Family Member Yes No   Sig: Insert 10 mg into the rectum every other day as needed   cholecalciferol (VITAMIN D3) 1,000 units tablet   Yes No   Sig: Take 1,000 Units by mouth daily   ergocalciferol (ERGOCALCIFEROL) 23040 units capsule   Yes No   Sig: Take 50,000 Units by mouth once a week   ferrous sulfate 325 (65 Fe) mg tablet  Family Member Yes No   Sig: Take 325 mg by mouth 2 (two) times a day with meals    furosemide (LASIX) 20 mg tablet  Family Member No No   Sig: Take 1 tablet (20 mg total) by mouth daily   lisinopril (ZESTRIL) 5 mg tablet  Family Member Yes No   Sig: Take 5 mg by mouth daily     magnesium hydroxide (MILK OF MAGNESIA) 400 mg/5 mL oral suspension   Yes No   Sig: Take 30 mL by mouth daily as needed for constipation   neomycin-bacitracin-polymyxin (NEOSPORIN) 5-400-5,000 ointment  Family Member Yes No   Sig: Apply topically 2 (two) times a day as needed (Skin tears)   rivastigmine (EXELON) 4 6 mg/24 hr TD 24 hr patch  Family Member Yes No   Sig: Place 1 patch on the skin   sertraline (ZOLOFT) 50 mg tablet  Family Member Yes No   Sig: Take 50 mg by mouth daily   warfarin (COUMADIN) 5 mg tablet  Family Member Yes No   Sig: Take 5 mg by mouth daily at bedtime       Facility-Administered Medications: None       Past Medical History:   Diagnosis Date    Anxiety     Dementia     DJD (degenerative joint disease)     Hyperlipidemia     Hypertension     Psychiatric disorder     anxiety depresion        History reviewed  No pertinent surgical history  History reviewed  No pertinent family history  I have reviewed and agree with the history as documented  Social History     Tobacco Use    Smoking status: Never Smoker    Smokeless tobacco: Never Used   Substance Use Topics    Alcohol use: No    Drug use: No        Review of Systems   Constitutional: Negative for chills and fever  HENT: Negative for rhinorrhea and sore throat  Eyes: Negative for visual disturbance  Respiratory: Negative for cough and shortness of breath  Cardiovascular: Negative for chest pain and leg swelling  Gastrointestinal: Negative for abdominal pain, diarrhea, nausea and vomiting  Genitourinary: Negative for dysuria  Musculoskeletal: Negative for back pain and myalgias  Skin: Negative for rash  Neurological: Negative for dizziness and headaches  Psychiatric/Behavioral: Negative for confusion  All other systems reviewed and are negative  Physical Exam  Physical Exam   Constitutional: He is oriented to person, place, and time  He appears well-developed and well-nourished  HENT:   Head: Normocephalic  Nose: Nose normal    Mouth/Throat: Oropharynx is clear and moist  No oropharyngeal exudate     Skin tear of the left temple   Eyes: Pupils are equal, round, and reactive to light  Conjunctivae and EOM are normal  No scleral icterus  Neck: Normal range of motion  Neck supple  No JVD present  No tracheal deviation present  Cardiovascular: Normal rate, regular rhythm and normal heart sounds  No murmur heard  Pulmonary/Chest: Effort normal and breath sounds normal  No respiratory distress  He has no wheezes  He has no rales  Abdominal: Soft  Bowel sounds are normal  There is no tenderness  There is no guarding  Musculoskeletal: Normal range of motion  He exhibits no edema or tenderness  Neurological: He is alert and oriented to person, place, and time  No cranial nerve deficit or sensory deficit  He exhibits normal muscle tone  5/5 motor, nl sens   Skin: Skin is warm and dry  Poor turgor  This is consistent with his age  Patient has skin tears of the right wrist/forearm   Psychiatric: He has a normal mood and affect  His behavior is normal    Nursing note and vitals reviewed  Vital Signs  ED Triage Vitals [09/07/19 1637]   Temperature Pulse Respirations Blood Pressure SpO2   (!) 97 °F (36 1 °C) 55 16 151/70 99 %      Temp Source Heart Rate Source Patient Position - Orthostatic VS BP Location FiO2 (%)   Temporal Monitor Sitting Left arm --      Pain Score       No Pain           Vitals:    09/07/19 1637   BP: 151/70   Pulse: 55   Patient Position - Orthostatic VS: Sitting         Visual Acuity      ED Medications  Medications - No data to display    Diagnostic Studies  Results Reviewed     None                 CT head without contrast   Final Result by Brandy Benson MD (09/07 1828)      No acute intracranial abnormality  Workstation performed: [id number]         CT spine cervical without contrast    (Results Pending)              Procedures  Procedures       ED Course       Patient sent here via EMS due to fall from seated position in a wheelchair   Has skin tear of left temple area - CT of head with no acute finding - he is on anti-coagulants  Also suffered skin tears of right wrist and forearm - not repair able '    Denies pain or discomfort - discharge back to nursing home  MDM    Disposition  Final diagnoses:   Fall, initial encounter   Injury of head, initial encounter     Time reflects when diagnosis was documented in both MDM as applicable and the Disposition within this note     Time User Action Codes Description Comment    9/7/2019  7:11 PM Sarah Carbajal [W19  Dickie Ferraris Fall, initial encounter     9/7/2019  7:11 PM Sarah Eaton Add [S09 [id number]] Injury of head, initial encounter       ED Disposition     ED Disposition Condition Date/Time Comment    Discharge Stable Sat Sep 7, 2019  7:11 PM Nancy Jones discharge to Nursing Home          Follow-up Information     Follow up With Specialties Details Why [address], [facility], Nurse Practitioner In 2 days  [address] [place] [address]  [phone]            Patient's Medications   Discharge Prescriptions    No medications on file     No discharge procedures on file      ED Provider  Electronically Signed by           Kadi Zamudio MD  09/07/19 9461

## 2019-09-07 NOTE — ED NOTES
Pt has hx of frequent falls    Seen here for same recently     Ron Strickland, VERENICE  09/07/19 1703

## 2019-09-07 NOTE — ED NOTES
Pt again yelling for nurse  Refuses to use call bell  Stating "get me the hell out of here!"  Explained that pt is here because he fell again and needs to be checked for injuries    Per pt "I can't believe this!"     Tess Marie RN  09/07/19 9693

## 2019-09-07 NOTE — ED NOTES
Pt attempting to climb out of bed  Placed back in bed by 2 staff members  Pt remains confused as to why he is here and is agitated    Pt is Close to nurses station     Janneth Bryan, [facility]  09/07/19 0122

## 2019-09-07 NOTE — ED NOTES
Pt yelling for nurse (has call bell) states he has to urinate  Pulled down his pants and placed urine bottle between his legs, instructed pt to go ahead and urinate  Pt agitated, asking "what am I doing here?!"    (pt has dementia, hx frequent falls)     Moses Juarez RN  09/07/19 9954

## 2019-09-07 NOTE — ED NOTES
2 small skin tears rt hand cleansed, dressed with bacitracin, adaptic, 2x2 gauze, 3 inch cling     Hilario Thorne RN  09/07/19 6031

## 2019-09-08 NOTE — ED NOTES
Pt wanting to go to his bed  Explained that we do not have a ride for him until 2am and he can lay on our stretcher to sleep  Assisted pt to bed with the help of another nurse  Lights out radha Lind RN  09/07/19 6410

## 2019-09-08 NOTE — ED NOTES
Called the [facility], gave update and d/c instructions to Devyn Loera rn    Advised that pt will be transported by slets at [address], [address] [facility]  09/07/19 2105

## 2019-11-01 ENCOUNTER — HOSPITAL ENCOUNTER (EMERGENCY)
Facility: HOSPITAL | Age: 84
Discharge: HOME | End: 2019-11-01
Attending: INTERNAL MEDICINE | Admitting: INTERNAL MEDICINE
Payer: COMMERCIAL

## 2019-11-01 ENCOUNTER — APPOINTMENT (EMERGENCY)
Dept: CT IMAGING | Facility: HOSPITAL | Age: 84
End: 2019-11-01
Payer: COMMERCIAL

## 2019-11-01 VITALS
RESPIRATION RATE: 20 BRPM | HEART RATE: 54 BPM | HEIGHT: 66 IN | SYSTOLIC BLOOD PRESSURE: 180 MMHG | WEIGHT: 180.78 LBS | OXYGEN SATURATION: 96 % | BODY MASS INDEX: 29.05 KG/M2 | TEMPERATURE: 98.8 F | DIASTOLIC BLOOD PRESSURE: 82 MMHG

## 2019-11-01 DIAGNOSIS — S09.12XA LACERATION OF FASCIA OF HEAD: ICD-10-CM

## 2019-11-01 DIAGNOSIS — S00.03XA HEMATOMA OF PARIETAL SCALP: ICD-10-CM
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
## 2019-11-01 DIAGNOSIS — S51.011A SKIN TEAR OF RIGHT ELBOW WITHOUT COMPLICATION, INITIAL ENCOUNTER: ICD-10-CM

## 2019-11-01 LAB
APTT PPP: 41 SECONDS (ref 23–37)
BASOPHILS # BLD AUTO: 0 THOUSANDS/ΜL (ref 0–0.1)
BASOPHILS NFR BLD AUTO: 1 % (ref 0–2)
EOSINOPHIL # BLD AUTO: 0.2 THOUSAND/ΜL (ref 0–0.61)
EOSINOPHIL NFR BLD AUTO: 3 % (ref 0–5)
ERYTHROCYTE [DISTWIDTH] IN BLOOD BY AUTOMATED COUNT: 14.6 % (ref 11.5–14.5)
HCT VFR BLD AUTO: 34.6 % (ref 42–47)
HGB BLD-MCNC: 11.1 G/DL (ref 14–18)
INR PPP: 3.95 (ref 0.9–1.5)
LYMPHOCYTES # BLD AUTO: 1.2 THOUSANDS/ΜL (ref 0.6–4.47)
LYMPHOCYTES NFR BLD AUTO: 16 % (ref 21–51)
MCH RBC QN AUTO: 30.8 PG (ref 26–34)
MCHC RBC AUTO-ENTMCNC: 32.1 G/DL (ref 31–37)
MCV RBC AUTO: 96 FL (ref 81–99)
MONOCYTES # BLD AUTO: 0.8 THOUSAND/ΜL (ref 0.17–1.22)
MONOCYTES NFR BLD AUTO: 11 % (ref 2–12)
NEUTROPHILS # BLD AUTO: 5.5 THOUSANDS/ΜL (ref 1.4–6.5)
NEUTS SEG NFR BLD AUTO: 71 % (ref 42–75)
PLATELET # BLD AUTO: 241 THOUSANDS/UL (ref 149–390)
PMV BLD AUTO: 8.6 FL (ref 8.6–11.7)
PROTHROMBIN TIME: 46.5 SECONDS (ref 10.2–13)
RBC # BLD AUTO: 3.61 MILLION/UL (ref 4.3–5.9)
WBC # BLD AUTO: 7.7 THOUSAND/UL (ref 4.8–10.8)

## 2019-11-01 PROCEDURE — 99284 EMERGENCY DEPT VISIT MOD MDM: CPT

## 2019-11-01 PROCEDURE — 36415 COLL VENOUS BLD VENIPUNCTURE: CPT | Performed by: INTERNAL MEDICINE

## 2019-11-01 PROCEDURE — 85025 COMPLETE CBC W/AUTO DIFF WBC: CPT | Performed by: INTERNAL MEDICINE

## 2019-11-01 PROCEDURE — 70450 CT HEAD/BRAIN W/O DYE: CPT

## 2019-11-01 PROCEDURE — 85610 PROTHROMBIN TIME: CPT | Performed by: INTERNAL MEDICINE

## 2019-11-01 PROCEDURE — 85730 THROMBOPLASTIN TIME PARTIAL: CPT | Performed by: INTERNAL MEDICINE

## 2019-11-01 RX ORDER — GINSENG 100 MG
1 CAPSULE ORAL ONCE
Status: COMPLETED | OUTPATIENT
Start: 2019-11-01 | End: 2019-11-01

## 2019-11-01 RX ORDER — AMLODIPINE BESYLATE 5 MG/1
5 TABLET ORAL ONCE
Status: COMPLETED | OUTPATIENT
Start: 2019-11-01 | End: 2019-11-01

## 2019-11-01 RX ADMIN — AMLODIPINE BESYLATE 5 MG: 5 TABLET ORAL at 02:01

## 2019-11-01 RX ADMIN — BACITRACIN 1 LARGE APPLICATION: 500 OINTMENT TOPICAL at 02:06

## 2019-11-01 RX ADMIN — BACITRACIN 1 LARGE APPLICATION: 500 OINTMENT TOPICAL at 01:19

## 2019-11-01 NOTE — ED NOTES
[facility] and left message asking for call back regarding transportation back to the [facility] for patient       Leesa Buitrago RN  11/01/19 4801

## 2019-11-01 NOTE — ED PROVIDER NOTES
History  No chief complaint on file  80YEAR-OLD MALE BROUGHT IN BY EMS STATUS POST FALL AT THE [facility]  THIS WAS A WITNESSED FALL WITH THE PATIENT HIT THE BACK OF HIS HEAD, THERE WAS NOTED BLEEDING, NO LOSS OF CONSCIOUSNESS  The patient suffers from dementia he is oriented to person  Does not recall the fall  Patient is on anticoagulation therapy including Coumadin  He arrives he is awake alert and oriented to person  He is in good spirits cheerful  Prior to Admission Medications   Prescriptions Last Dose Informant Patient Reported? Taking?    Memantine HCl ER (NAMENDA XR) 28 MG CP24  Family Member Yes No   Sig: Take 28 mg by mouth daily   acetaminophen (TYLENOL) 325 mg tablet  Family Member Yes No   Sig: Take 650 mg by mouth 2 (two) times a day     aspirin (ECOTRIN) 325 mg EC tablet   Yes No   Sig: Take 325 mg by mouth daily   atorvastatin (LIPITOR) 20 mg tablet  Family Member Yes No   Sig: Take 20 mg by mouth daily at bedtime     bisacodyl (DULCOLAX) 10 mg suppository  Family Member Yes No   Sig: Insert 10 mg into the rectum every other day as needed   cholecalciferol (VITAMIN D3) 1,000 units tablet   Yes No   Sig: Take 1,000 Units by mouth daily   ergocalciferol (ERGOCALCIFEROL) 91280 units capsule   Yes No   Sig: Take 50,000 Units by mouth once a week   ferrous sulfate 325 (65 Fe) mg tablet  Family Member Yes No   Sig: Take 325 mg by mouth 2 (two) times a day with meals    furosemide (LASIX) 20 mg tablet  Family Member No No   Sig: Take 1 tablet (20 mg total) by mouth daily   lisinopril (ZESTRIL) 5 mg tablet  Family Member Yes No   Sig: Take 5 mg by mouth daily     magnesium hydroxide (MILK OF MAGNESIA) 400 mg/5 mL oral suspension   Yes No   Sig: Take 30 mL by mouth daily as needed for constipation   neomycin-bacitracin-polymyxin (NEOSPORIN) 5-400-5,000 ointment  Family Member Yes No   Sig: Apply topically 2 (two) times a day as needed (Skin tears)   rivastigmine (EXELON) 4 6 mg/24 hr TD 24 hr patch  Family Member Yes No   Sig: Place 1 patch on the skin   sertraline (ZOLOFT) 50 mg tablet  Family Member Yes No   Sig: Take 50 mg by mouth daily   warfarin (COUMADIN) 5 mg tablet  Family Member Yes No   Sig: Take 5 mg by mouth daily at bedtime       Facility-Administered Medications: None       Past Medical History:   Diagnosis Date    Anxiety     Dementia     DJD (degenerative joint disease)     Hyperlipidemia     Hypertension     Psychiatric disorder     anxiety depresion        No past surgical history on file  No family history on file  I have reviewed and agree with the history as documented  Social History     Tobacco Use    Smoking status: Never Smoker    Smokeless tobacco: Never Used   Substance Use Topics    Alcohol use: No    Drug use: No        Review of Systems   Constitutional: Negative  Respiratory: Negative  Cardiovascular: Negative  Musculoskeletal: Negative  Skin: Positive for wound  Patient's right elbow has 2 skin tears 1 is about 2 cm the other 1 is about 2 5 cm they are not bleeding actively at this time  The bleeding is controlled on its own  Neurological: Negative  Psychiatric/Behavioral: Negative  Physical Exam  Physical Exam   Constitutional: He appears well-developed and well-nourished  HENT:   Head: Normocephalic and atraumatic  Eyes: Pupils are equal, round, and reactive to light  EOM are normal    Neck: Normal range of motion  Neck supple  Cardiovascular: Normal rate, regular rhythm and normal heart sounds  Pulmonary/Chest: Effort normal and breath sounds normal    Abdominal: Soft  Bowel sounds are normal    Musculoskeletal: Normal range of motion  Neurological: He is alert  No cranial nerve deficit  Coordination normal    Skin:   Patient's scalp has a superficial laceration in the occipital region that 2 in 0 25 cm in length is not bleeding at this time  He does have a large hematoma in this area    Not appear to be tender to touch  Patient has 2 skin abrasions skin tear is on his right elbow knee there is bleeding at this time dressing was removed observation the larger 1 is about 2 cm in length the other 1 is about 1 5 cm in length either bleeding at this time   Psychiatric: He has a normal mood and affect  His behavior is normal    Nursing note and vitals reviewed  Vital Signs  ED Triage Vitals   Temp Pulse Resp BP SpO2   -- -- -- -- --      Temp src Heart Rate Source Patient Position - Orthostatic VS BP Location FiO2 (%)   -- -- -- -- --      Pain Score       --           There were no vitals filed for this visit  Visual Acuity      ED Medications  Medications - No data to display    Diagnostic Studies  Results Reviewed     None                 No orders to display              Procedures  Procedures       ED Course                               MDM    Disposition  Final diagnoses:   None     ED Disposition     None      Follow-up Information    None         Patient's Medications   Discharge Prescriptions    No medications on file     No discharge procedures on file      ED Provider  Electronically Signed by           Patty Ghotra MD  11/01/19 1392

## 2019-11-01 NOTE — ED NOTES
Called [facility] and spoke to Marla  Advised patient would be back this morning once transportation is obtained       Patti Moody RN  11/01/19 7500

## 2019-11-01 NOTE — ED NOTES
Posterior head wound cleansed with wound cleanser, bacitracin applied along with non-adherent dressing       Radha Byrne RN  11/01/19 4851

## 2019-11-01 NOTE — DISCHARGE INSTRUCTIONS
Patient has an abrasion back small separation of scan nonbleeding  Large hematoma patient's INR is 3 95 recommend holding Coumadin for 2 days and retesting

## 2019-11-01 NOTE — ED NOTES
It was reported by EMS that patient fell while at [facility] onto carpeted area hitting head and sustaining wound to right arm  Patient does not recall the event  No reported LOC by EMS  Patient has dementia and is pleasantly confused       Nicole Peña RN  11/01/19 4727

## 2019-11-27 ENCOUNTER — APPOINTMENT (EMERGENCY)
Dept: CT IMAGING | Facility: HOSPITAL | Age: 84
End: 2019-11-27
Payer: COMMERCIAL

## 2019-11-27 ENCOUNTER — APPOINTMENT (EMERGENCY)
Dept: RADIOLOGY | Facility: HOSPITAL | Age: 84
End: 2019-11-27
Payer: COMMERCIAL

## 2019-11-27 ENCOUNTER — HOSPITAL ENCOUNTER (EMERGENCY)
Facility: HOSPITAL | Age: 84
Discharge: HOME | End: 2019-11-27
Attending: EMERGENCY MEDICINE | Admitting: EMERGENCY MEDICINE
Payer: COMMERCIAL

## 2019-11-27 VITALS
SYSTOLIC BLOOD PRESSURE: 145 MMHG | OXYGEN SATURATION: 96 % | WEIGHT: 180 LBS | BODY MASS INDEX: 29.05 KG/M2 | DIASTOLIC BLOOD PRESSURE: 65 MMHG | RESPIRATION RATE: 18 BRPM | HEART RATE: 53 BPM | TEMPERATURE: 97 F

## 2019-11-27 DIAGNOSIS — S61.412A SKIN TEAR OF LEFT HAND WITHOUT COMPLICATION, INITIAL ENCOUNTER: ICD-10-CM

## 2019-11-27 DIAGNOSIS — S51.012A SKIN TEAR OF LEFT ELBOW WITHOUT COMPLICATION, INITIAL ENCOUNTER: Primary | ICD-10-CM
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
## 2019-11-27 DIAGNOSIS — R79.1 ELEVATED INR: ICD-10-CM

## 2019-11-27 DIAGNOSIS — H11.32 SUBCONJUNCTIVAL HEMORRHAGE OF LEFT EYE: ICD-10-CM

## 2019-11-27 DIAGNOSIS — S40.012A CONTUSION OF LEFT SHOULDER, INITIAL ENCOUNTER: ICD-10-CM

## 2019-11-27 LAB
ALBUMIN SERPL BCP-MCNC: 3.4 G/DL (ref 3.5–5.7)
ALP SERPL-CCNC: 90 U/L (ref 55–165)
ALT SERPL W P-5'-P-CCNC: 30 U/L (ref 7–52)
ANION GAP SERPL CALCULATED.3IONS-SCNC: 9 MMOL/L (ref 4–13)
APTT PPP: 44 SECONDS (ref 23–37)
AST SERPL W P-5'-P-CCNC: 42 U/L (ref 13–39)
BASOPHILS # BLD AUTO: 0 THOUSANDS/ΜL (ref 0–0.1)
BASOPHILS NFR BLD AUTO: 1 % (ref 0–2)
BILIRUB SERPL-MCNC: 0.5 MG/DL (ref 0.2–1)
BUN SERPL-MCNC: 41 MG/DL (ref 7–25)
CALCIUM SERPL-MCNC: 8.3 MG/DL (ref 8.6–10.5)
CHLORIDE SERPL-SCNC: 107 MMOL/L (ref 98–107)
CO2 SERPL-SCNC: 26 MMOL/L (ref 21–31)
CREAT SERPL-MCNC: 1.74 MG/DL (ref 0.7–1.3)
EOSINOPHIL # BLD AUTO: 0.2 THOUSAND/ΜL (ref 0–0.61)
EOSINOPHIL NFR BLD AUTO: 2 % (ref 0–5)
ERYTHROCYTE [DISTWIDTH] IN BLOOD BY AUTOMATED COUNT: 14.9 % (ref 11.5–14.5)
GFR SERPL CREATININE-BSD FRML MDRD: 34 ML/MIN/1.73SQ M
GLUCOSE SERPL-MCNC: 100 MG/DL (ref 65–99)
HCT VFR BLD AUTO: 28.6 % (ref 42–47)
HGB BLD-MCNC: 9 G/DL (ref 14–18)
INR PPP: 4.74 (ref 0.9–1.5)
LYMPHOCYTES # BLD AUTO: 0.9 THOUSANDS/ΜL (ref 0.6–4.47)
LYMPHOCYTES NFR BLD AUTO: 12 % (ref 21–51)
MCH RBC QN AUTO: 29.9 PG (ref 26–34)
MCHC RBC AUTO-ENTMCNC: 31.3 G/DL (ref 31–37)
MCV RBC AUTO: 96 FL (ref 81–99)
MONOCYTES # BLD AUTO: 1 THOUSAND/ΜL (ref 0.17–1.22)
MONOCYTES NFR BLD AUTO: 12 % (ref 2–12)
NEUTROPHILS # BLD AUTO: 6 THOUSANDS/ΜL (ref 1.4–6.5)
NEUTS SEG NFR BLD AUTO: 74 % (ref 42–75)
PLATELET # BLD AUTO: 312 THOUSANDS/UL (ref 149–390)
PMV BLD AUTO: 8.2 FL (ref 8.6–11.7)
POTASSIUM SERPL-SCNC: 5.3 MMOL/L (ref 3.5–5.5)
PROT SERPL-MCNC: 7 G/DL (ref 6.4–8.9)
PROTHROMBIN TIME: 55.8 SECONDS (ref 10.2–13)
RBC # BLD AUTO: 2.99 MILLION/UL (ref 4.3–5.9)
SODIUM SERPL-SCNC: 142 MMOL/L (ref 134–143)
WBC # BLD AUTO: 8.1 THOUSAND/UL (ref 4.8–10.8)

## 2019-11-27 PROCEDURE — 85025 COMPLETE CBC W/AUTO DIFF WBC: CPT | Performed by: EMERGENCY MEDICINE

## 2019-11-27 PROCEDURE — 70450 CT HEAD/BRAIN W/O DYE: CPT

## 2019-11-27 PROCEDURE — 80053 COMPREHEN METABOLIC PANEL: CPT | Performed by: EMERGENCY MEDICINE

## 2019-11-27 PROCEDURE — 72125 CT NECK SPINE W/O DYE: CPT

## 2019-11-27 PROCEDURE — 99284 EMERGENCY DEPT VISIT MOD MDM: CPT

## 2019-11-27 PROCEDURE — 85730 THROMBOPLASTIN TIME PARTIAL: CPT | Performed by: EMERGENCY MEDICINE

## 2019-11-27 PROCEDURE — 99285 EMERGENCY DEPT VISIT HI MDM: CPT | Performed by: EMERGENCY MEDICINE

## 2019-11-27 PROCEDURE — 85610 PROTHROMBIN TIME: CPT | Performed by: EMERGENCY MEDICINE

## 2019-11-27 PROCEDURE — 36415 COLL VENOUS BLD VENIPUNCTURE: CPT | Performed by: EMERGENCY MEDICINE

## 2019-11-27 PROCEDURE — 73030 X-RAY EXAM OF SHOULDER: CPT

## 2019-11-27 NOTE — ED PROVIDER NOTES
History  Chief Complaint   Patient presents with    Fall     Patient is a 49-year-old male who is a resident of a nursing home  Today he fell out of a chair injuring his left shoulder  It is unclear if he hit is head  There was no loss of consciousness  He is on Coumadin  There is a skin tear to his left elbow and to his right hand  Tetanus vaccination is up-to-date  No other apparent injuries  Patient is hard of hearing and has dementia  He is a poor historian  He denies any particular pain at this time  The injury occurred just prior to arrival   Patient presents by EMS  Prior to Admission Medications   Prescriptions Last Dose Informant Patient Reported? Taking?    Memantine HCl ER (NAMENDA XR) 28 MG CP24  Family Member Yes No   Sig: Take 28 mg by mouth daily   acetaminophen (TYLENOL) 325 mg tablet  Family Member Yes No   Sig: Take 650 mg by mouth 2 (two) times a day     aspirin (ECOTRIN) 325 mg EC tablet   Yes No   Sig: Take 325 mg by mouth daily   atorvastatin (LIPITOR) 20 mg tablet  Family Member Yes No   Sig: Take 20 mg by mouth daily at bedtime     bisacodyl (DULCOLAX) 10 mg suppository  Family Member Yes No   Sig: Insert 10 mg into the rectum every other day as needed   cholecalciferol (VITAMIN D3) 1,000 units tablet   Yes No   Sig: Take 1,000 Units by mouth daily   ergocalciferol (ERGOCALCIFEROL) 98670 units capsule   Yes No   Sig: Take 50,000 Units by mouth once a week   ferrous sulfate 325 (65 Fe) mg tablet  Family Member Yes No   Sig: Take 325 mg by mouth 2 (two) times a day with meals    furosemide (LASIX) 20 mg tablet  Family Member No No   Sig: Take 1 tablet (20 mg total) by mouth daily   lisinopril (ZESTRIL) 5 mg tablet  Family Member Yes No   Sig: Take 5 mg by mouth daily     magnesium hydroxide (MILK OF MAGNESIA) 400 mg/5 mL oral suspension   Yes No   Sig: Take 30 mL by mouth daily as needed for constipation   neomycin-bacitracin-polymyxin (NEOSPORIN) 5-400-5,000 ointment Family Member Yes No   Sig: Apply topically 2 (two) times a day as needed (Skin tears)   rivastigmine (EXELON) 4 6 mg/24 hr TD 24 hr patch  Family Member Yes No   Sig: Place 1 patch on the skin   sertraline (ZOLOFT) 50 mg tablet  Family Member Yes No   Sig: Take 50 mg by mouth daily   warfarin (COUMADIN) 5 mg tablet  Family Member Yes No   Sig: Take 5 mg by mouth daily at bedtime       Facility-Administered Medications: None       Past Medical History:   Diagnosis Date    Anxiety     Dementia ([facility] [address] )     DJD (degenerative joint disease)     Hyperlipidemia     Hypertension     Psychiatric disorder     anxiety depresion        History reviewed  No pertinent surgical history  History reviewed  No pertinent family history  I have reviewed and agree with the history as documented  Social History     Tobacco Use    Smoking status: Never Smoker    Smokeless tobacco: Never Used   Substance Use Topics    Alcohol use: No    Drug use: No        Review of Systems   Unable to perform ROS: Dementia       Physical Exam  Physical Exam   Constitutional: He appears well-developed and well-nourished  No distress  HENT:   Head: Normocephalic and atraumatic  There is no palpable scalp step off or swelling  No lacerations  There is no facial bone tenderness  No swelling or step-offs  No crepitus  There is no LeFort fracture  No otorrhea  No rhinorrhea  No nasal deformity or epistaxis  There is no raccoon's or Deal's sign  Eyes: Pupils are equal, round, and reactive to light  EOM are normal    There is a left subconjunctival hemorrhage  There is no orbital bony tenderness, swelling or step-off  Neck:   There is no midline C-spine tenderness  Trachea is midline  There is no step-offs or swelling  No crepitus  No JVD  Cardiovascular: Normal rate, regular rhythm, normal heart sounds and intact distal pulses  Exam reveals no gallop and no friction rub  No murmur heard    Pulmonary/Chest: Effort normal and breath sounds normal  No stridor  No respiratory distress  He exhibits no tenderness  There is no bruising  No crepitus  Abdominal: Soft  Bowel sounds are normal  He exhibits no distension  There is no tenderness  There is no rebound and no guarding  Musculoskeletal: Normal range of motion  He exhibits no tenderness or deformity  There is a bruise in the midline over the upper thoracic spine  There is no tenderness over the thoracic or lumbar spine  No CVA tenderness  There are bruises to the upper extremities  No point tenderness  There is a well-approximated skin tear to the left hand into the left elbow  Otherwise extremities are atraumatic  Neurological: He is alert  He has normal strength  No sensory deficit  GCS eye subscore is 4  GCS verbal subscore is 4  GCS motor subscore is 6  Patient is demented  Skin: Skin is warm and dry  He is not diaphoretic  No pallor  Psychiatric: He has a normal mood and affect  Vitals reviewed        Vital Signs  ED Triage Vitals [11/27/19 1447]   Temperature Pulse Respirations Blood Pressure SpO2   (!) 97 °F (36 1 °C) (!) 53 18 145/65 96 %      Temp Source Heart Rate Source Patient Position - Orthostatic VS BP Location FiO2 (%)   Temporal Monitor Sitting Right arm --      Pain Score       --           Vitals:    11/27/19 1447 11/27/19 1510   BP: 145/65    Pulse: (!) 53 (!) 53   Patient Position - Orthostatic VS: Sitting          Visual Acuity      ED Medications  Medications - No data to display    Diagnostic Studies  Results Reviewed     Procedure Component Value Units Date/Time    Protime-INR [[id number]]  (Abnormal) Collected:  11/27/19 1506    Lab Status:  Final result Specimen:  Blood from Arm, Right Updated:  11/27/19 1552     Protime 55 8 seconds      INR 4 74    APTT [[id number]]  (Abnormal) Collected:  11/27/19 1506    Lab Status:  Final result Specimen:  Blood from Arm, Right Updated:  11/27/19 1552     PTT 44 seconds     Comprehensive metabolic panel [[id number]]  (Abnormal) Collected:  11/27/19 1506    Lab Status:  Final result Specimen:  Blood from Arm, Right Updated:  11/27/19 1551     Sodium 142 mmol/L      Potassium 5 3 mmol/L      Chloride 107 mmol/L      CO2 26 mmol/L      ANION GAP 9 mmol/L      BUN 41 mg/dL      Creatinine 1 74 mg/dL      Glucose 100 mg/dL      Calcium 8 3 mg/dL      AST 42 U/L      ALT 30 U/L      Alkaline Phosphatase 90 U/L      Total Protein 7 0 g/dL      Albumin 3 4 g/dL      Total Bilirubin 0 50 mg/dL      eGFR 34 ml/min/1 73sq m     Narrative:       National Kidney Disease Foundation guidelines for Chronic Kidney Disease (CKD):     Stage 1 with normal or high GFR (GFR > 90 mL/min/1 73 square meters)    Stage 2 Mild CKD (GFR = 60-89 mL/min/1 73 square meters)    Stage 3A Moderate CKD (GFR = 45-59 mL/min/1 73 square meters)    Stage 3B Moderate CKD (GFR = 30-44 mL/min/1 73 square meters)    Stage 4 Severe CKD (GFR = 15-29 mL/min/1 73 square meters)    Stage 5 End Stage CKD (GFR <15 mL/min/1 73 square meters)  Note: GFR calculation is accurate only with a steady state creatinine    CBC and differential [[id number]]  (Abnormal) Collected:  11/27/19 1506    Lab Status:  Final result Specimen:  Blood from Arm, Right Updated:  11/27/19 1514     WBC 8 10 Thousand/uL      RBC 2 99 Million/uL      Hemoglobin 9 0 g/dL      Hematocrit 28 6 %      MCV 96 fL      MCH 29 9 pg      MCHC 31 3 g/dL      RDW 14 9 %      MPV 8 2 fL      Platelets 761 Thousands/uL      Neutrophils Relative 74 %      Lymphocytes Relative 12 %      Monocytes Relative 12 %      Eosinophils Relative 2 %      Basophils Relative 1 %      Neutrophils Absolute 6 00 Thousands/µL      Lymphocytes Absolute 0 90 Thousands/µL      Monocytes Absolute 1 00 Thousand/µL      Eosinophils Absolute 0 20 Thousand/µL      Basophils Absolute 0 00 Thousands/µL                  XR shoulder 2+ views LEFT   ED Interpretation by Kimmie Hubbard MD (11/27 1614)   No fracture or dislocation Final Result by Bo Cui DO (11/27 1604)      No acute osseous abnormality  High riding humeral head suggesting chronic rotator cuff arthropathy  Workstation performed: [id number]         CT cervical spine without contrast   Final Result by Bo Cui DO (11/27 1618)      Moderate cervical spondylitic degenerative change with no acute osseous abnormality  Workstation performed: [id number]         CT head without contrast   Final Result by Bo Cui DO (11/27 1614)      No acute intracranial abnormality  Microangiopathic changes  Workstation performed: [id number]                    Procedures  Procedures       ED Course                               MDM  Number of Diagnoses or Management Options  Diagnosis management comments: Head CT was negative for skull fracture or intracranial hemorrhage  CT of cervical spine was negative for fracture subluxation  INR was slightly supratherapeutic  This will need to be held  Reversal is not necessary at this time  Otherwise lab work was nonspecific  There was an anemia, but this appears to be chronic  I do not feel patient is actively bleeding at this time  Skin tears were well approximated with Steri-Strips by nursing  Shoulder x-ray was negative for fracture or dislocation         Amount and/or Complexity of Data Reviewed  Clinical lab tests: ordered and reviewed  Tests in the radiology section of CPT®: reviewed and ordered  Independent visualization of images, tracings, or specimens: yes        Disposition  Final diagnoses:   Skin tear of left elbow without complication, initial encounter   Skin tear of left hand without complication, initial encounter   Contusion of left shoulder, initial encounter   Subconjunctival hemorrhage of left eye   Elevated INR   Fall, initial encounter     Time reflects when diagnosis was documented in both MDM as applicable and the Disposition within this note Time User Action Codes Description Comment    11/27/2019  4:45 PM Elane Burow Add [D86 982L] Skin tear of left elbow without complication, initial encounter     11/27/2019  4:45 PM Elane Burow Add [I80 046J] Skin tear of left hand without complication, initial encounter     11/27/2019  4:46 PM Elane Burow Add [S40 012A] Contusion of left shoulder, initial encounter     11/27/2019  4:46 PM Elane Burow Add [H11 32] Subconjunctival hemorrhage of left eye     11/27/2019  4:46 PM Elane Burow Add [R79 1] Elevated INR     11/27/2019  4:47 PM Elane Burow Add [O99  Bebeto Murphy, initial encounter       ED Disposition     ED Disposition Condition Date/Time Comment    Discharge Stable Wed Nov 27, 2019  4:45 PM Dairl Moritz discharge to home/self care  Follow-up Information     Follow up With Specialties Details Why [address], [address] [facility], Nurse Practitioner In 2 days As needed [place] [address]  [address]  [phone]            Patient's Medications   Discharge Prescriptions    No medications on file     No discharge procedures on file      ED Provider  Electronically Signed by           Aurelia Gil MD  11/27/19 886 Nikolas Rees MD  11/27/19 3239

## 2019-11-27 NOTE — ED NOTES
[facility] TO PROVID[facility]      Zaire Bearden RN  11/27/19 9581

## 2019-12-16 ENCOUNTER — HOSPITAL ENCOUNTER (EMERGENCY)
Facility: HOSPITAL | Age: 84
Discharge: HOME | End: 2019-12-16
Attending: EMERGENCY MEDICINE | Admitting: EMERGENCY MEDICINE
Payer: COMMERCIAL

## 2019-12-16 ENCOUNTER — APPOINTMENT (EMERGENCY)
Dept: CT IMAGING | Facility: HOSPITAL | Age: 84
End: 2019-12-16
Payer: COMMERCIAL

## 2019-12-16 VITALS
OXYGEN SATURATION: 94 % | WEIGHT: 180 LBS | DIASTOLIC BLOOD PRESSURE: 78 MMHG | RESPIRATION RATE: 18 BRPM | HEIGHT: 67 IN | SYSTOLIC BLOOD PRESSURE: 185 MMHG | HEART RATE: 55 BPM | TEMPERATURE: 98.3 F | BODY MASS INDEX: 28.25 KG/M2
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
## 2019-12-16 LAB
APTT PPP: 38 SECONDS (ref 23–37)
INR PPP: 1.83 (ref 0.9–1.5)
PROTHROMBIN TIME: 21.4 SECONDS (ref 10.2–13)

## 2019-12-16 PROCEDURE — 99284 EMERGENCY DEPT VISIT MOD MDM: CPT

## 2019-12-16 PROCEDURE — 72125 CT NECK SPINE W/O DYE: CPT

## 2019-12-16 PROCEDURE — 85730 THROMBOPLASTIN TIME PARTIAL: CPT | Performed by: EMERGENCY MEDICINE

## 2019-12-16 PROCEDURE — 36415 COLL VENOUS BLD VENIPUNCTURE: CPT | Performed by: EMERGENCY MEDICINE

## 2019-12-16 PROCEDURE — 99284 EMERGENCY DEPT VISIT MOD MDM: CPT | Performed by: EMERGENCY MEDICINE

## 2019-12-16 PROCEDURE — 70450 CT HEAD/BRAIN W/O DYE: CPT

## 2019-12-16 PROCEDURE — 85610 PROTHROMBIN TIME: CPT | Performed by: EMERGENCY MEDICINE

## 2019-12-16 PROCEDURE — NC001 PR NO CHARGE: Performed by: EMERGENCY MEDICINE

## 2019-12-16 NOTE — ED PROVIDER NOTES
History  Chief Complaint   Patient presents with    Fall     fell and hit head at [facility], no LOC, on a blood thinner     Patient is a 26-year-old male  He is in a nursing home  He is demented  He has frequent falls  He is on Coumadin  Today he fell  He may have struck his head  No other apparent injuries  No loss of consciousness  History is limited due to dementia  Patient currently is without complaints  He denies pain  Prior to Admission Medications   Prescriptions Last Dose Informant Patient Reported? Taking?    Memantine HCl ER (NAMENDA XR) 28 MG CP24  Family Member Yes No   Sig: Take 28 mg by mouth daily   acetaminophen (TYLENOL) 325 mg tablet  Family Member Yes No   Sig: Take 650 mg by mouth 2 (two) times a day     aspirin (ECOTRIN) 325 mg EC tablet   Yes No   Sig: Take 325 mg by mouth daily   atorvastatin (LIPITOR) 20 mg tablet  Family Member Yes No   Sig: Take 20 mg by mouth daily at bedtime     bisacodyl (DULCOLAX) 10 mg suppository  Family Member Yes No   Sig: Insert 10 mg into the rectum every other day as needed   cholecalciferol (VITAMIN D3) 1,000 units tablet   Yes No   Sig: Take 1,000 Units by mouth daily   ergocalciferol (ERGOCALCIFEROL) 69167 units capsule   Yes No   Sig: Take 50,000 Units by mouth once a week   ferrous sulfate 325 (65 Fe) mg tablet  Family Member Yes No   Sig: Take 325 mg by mouth 2 (two) times a day with meals    furosemide (LASIX) 20 mg tablet  Family Member No No   Sig: Take 1 tablet (20 mg total) by mouth daily   lisinopril (ZESTRIL) 5 mg tablet  Family Member Yes No   Sig: Take 5 mg by mouth daily     magnesium hydroxide (MILK OF MAGNESIA) 400 mg/5 mL oral suspension   Yes No   Sig: Take 30 mL by mouth daily as needed for constipation   neomycin-bacitracin-polymyxin (NEOSPORIN) 5-400-5,000 ointment  Family Member Yes No   Sig: Apply topically 2 (two) times a day as needed (Skin tears)   rivastigmine (EXELON) 4 6 mg/24 hr TD 24 hr patch  Family Member Yes No Sig: Place 1 patch on the skin   sertraline (ZOLOFT) 50 mg tablet  Family Member Yes No   Sig: Take 50 mg by mouth daily   warfarin (COUMADIN) 5 mg tablet  Family Member Yes No   Sig: Take 5 mg by mouth daily at bedtime       Facility-Administered Medications: None       Past Medical History:   Diagnosis Date    Anxiety     Dementia ([facility] [address] )     DJD (degenerative joint disease)     Hyperlipidemia     Hypertension     Psychiatric disorder     anxiety depresion        History reviewed  No pertinent surgical history  History reviewed  No pertinent family history  I have reviewed and agree with the history as documented  Social History     Tobacco Use    Smoking status: Never Smoker    Smokeless tobacco: Never Used   Substance Use Topics    Alcohol use: No    Drug use: No        Review of Systems   Unable to perform ROS: Dementia       Physical Exam  Physical Exam   Constitutional: He appears well-developed and well-nourished  No distress  HENT:   Head: Normocephalic and atraumatic  There is no palpable scalp step off or swelling  No lacerations  There is no facial bone tenderness  No swelling or step-offs  No crepitus  There is no LeFort fracture  No otorrhea  No rhinorrhea  No nasal deformity or epistaxis  There is no raccoon's or Deal's sign  Eyes: Pupils are equal, round, and reactive to light  EOM are normal    Globes are intact  No hyphema  No orbital step-offs  Neck:   There is no midline C-spine tenderness  Trachea is midline  There is no step-offs or swelling  No crepitus  No JVD  Cardiovascular: Regular rhythm, normal heart sounds and intact distal pulses  Exam reveals no gallop and no friction rub  No murmur heard  Bradycardic rate  Pulmonary/Chest: Effort normal and breath sounds normal  No stridor  No respiratory distress  He exhibits no tenderness  There is no bruising  No crepitus  Abdominal: Soft  Bowel sounds are normal  He exhibits no distension   There is no tenderness  There is no rebound and no guarding  Musculoskeletal: Normal range of motion  He exhibits no tenderness or deformity  No thoracic or lumbar spine tenderness  Pelvis is stable  No palpable step-offs or swelling  No crepitus  No CVA tenderness  Extremities are nontender  Neurological: He is alert  He has normal strength  No sensory deficit  GCS eye subscore is 4  GCS verbal subscore is 4  GCS motor subscore is 6  Dementia is present  Patient is at baseline mental status  Skin: Skin is warm and dry  He is not diaphoretic  No pallor  Psychiatric: He has a normal mood and affect  Vitals reviewed  Vital Signs  ED Triage Vitals [12/16/19 1746]   Temperature Pulse Respirations Blood Pressure SpO2   (!) 96 8 °F (36 °C) (!) 54 18 (!) 199/93 97 %      Temp Source Heart Rate Source Patient Position - Orthostatic VS BP Location FiO2 (%)   Temporal Monitor Lying Left arm --      Pain Score       No Pain           Vitals:    12/16/19 1746   BP: (!) 199/93   Pulse: (!) 54   Patient Position - Orthostatic VS: Lying         Visual Acuity  Visual Acuity      Most Recent Value   L Pupil Size (mm)  4   R Pupil Size (mm)  4          ED Medications  Medications - No data to display    Diagnostic Studies  Results Reviewed     Procedure Component Value Units Date/Time    Protime-INR [[id number]] Collected:  12/16/19 1811    Lab Status: In process Specimen:  Blood from Arm, Right Updated:  12/16/19 1813    APTT [[id number]] Collected:  12/16/19 1811    Lab Status:   In process Specimen:  Blood from Arm, Right Updated:  12/16/19 1813                 No orders to display              Procedures  Procedures         ED Course                               MDM  Number of Diagnoses or Management Options     Amount and/or Complexity of Data Reviewed  Clinical lab tests: ordered  Tests in the radiology section of CPT®: ordered          Disposition  Final diagnoses:   None     ED Disposition     None      Follow-up Information    None         Patient's Medications   Discharge Prescriptions    No medications on file     No discharge procedures on file      ED Provider  Electronically Signed by           Janet Armijo MD  12/17/19 8450

## 2019-12-17 NOTE — ED PROVIDER NOTES
Received sign out from prior team  Reviewed plan of care with them and will accept care of patient  Will follow up on labs and/or radiology, as well as dispo patient  Labs Reviewed   PROTIME-INR - Abnormal       Result Value Ref Range Status    Protime 21 4 (*) 10 2 - 13 0 seconds Final    Comment:      INR 1 83 (*) 0 90 - 1 50 Final    Comment:     APTT - Abnormal    PTT 38 (*) 23 - 37 seconds Final    Comment: Therapeutic Heparin Range =  60-90 seconds     CT head without contrast   Final Result      No acute intracranial abnormality  Workstation performed: [id number]         CT cervical spine without contrast   Final Result       No cervical spine fracture or traumatic malalignment  Workstation performed: [id number]           Time reflects when diagnosis was documented in both MDM as applicable and the Disposition within this note     Time User Action Codes Description Comment    12/16/2019  8:33 PM Sumeet Gallo Add [L76  XXXA] Fall       ED Disposition     ED Disposition Condition Date/Time Comment    Discharge Stable Mon Dec 16, 2019  8:33 PM Wally Meals discharge to home/self care              Follow-up Information     Follow up With Specialties Details Why [address], [address] [facility], Nurse Practitioner Schedule an appointment as soon as possible for a visit   [place] [address]  [phone]               Angeline Mares DO  12/16/19 7317

## 2019-12-17 NOTE — ED NOTES
Patient transported back to [facility] by [facility] Noa Soni RN  12/16/19 6432

## 2020-10-29 ENCOUNTER — APPOINTMENT (EMERGENCY)
Dept: CT IMAGING | Facility: HOSPITAL | Age: 85
End: 2020-10-29
Payer: COMMERCIAL

## 2020-10-29 ENCOUNTER — APPOINTMENT (EMERGENCY)
Dept: RADIOLOGY | Facility: HOSPITAL | Age: 85
End: 2020-10-29
Payer: COMMERCIAL

## 2020-10-29 ENCOUNTER — HOSPITAL ENCOUNTER (EMERGENCY)
Facility: HOSPITAL | Age: 85
Discharge: HOME | End: 2020-10-29
Attending: EMERGENCY MEDICINE | Admitting: EMERGENCY MEDICINE
Payer: COMMERCIAL

## 2020-10-29 VITALS
TEMPERATURE: 97.3 F | WEIGHT: 200 LBS | OXYGEN SATURATION: 98 % | BODY MASS INDEX: 31.32 KG/M2 | RESPIRATION RATE: 18 BRPM | SYSTOLIC BLOOD PRESSURE: 209 MMHG | DIASTOLIC BLOOD PRESSURE: 90 MMHG | HEART RATE: 51 BPM
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
## 2020-10-29 LAB
ALBUMIN SERPL BCP-MCNC: 3.9 G/DL (ref 3.5–5.7)
ALP SERPL-CCNC: 85 U/L (ref 55–165)
ALT SERPL W P-5'-P-CCNC: 8 U/L (ref 7–52)
ANION GAP SERPL CALCULATED.3IONS-SCNC: 7 MMOL/L (ref 4–13)
AST SERPL W P-5'-P-CCNC: 16 U/L (ref 13–39)
BASOPHILS # BLD AUTO: 0 THOUSANDS/ΜL (ref 0–0.1)
BASOPHILS NFR BLD AUTO: 1 % (ref 0–2)
BILIRUB SERPL-MCNC: 0.4 MG/DL (ref 0.2–1)
BUN SERPL-MCNC: 47 MG/DL (ref 7–25)
CALCIUM SERPL-MCNC: 9.1 MG/DL (ref 8.6–10.5)
CHLORIDE SERPL-SCNC: 102 MMOL/L (ref 98–107)
CO2 SERPL-SCNC: 28 MMOL/L (ref 21–31)
CREAT SERPL-MCNC: 1.76 MG/DL (ref 0.7–1.3)
EOSINOPHIL # BLD AUTO: 0.2 THOUSAND/ΜL (ref 0–0.61)
EOSINOPHIL NFR BLD AUTO: 2 % (ref 0–5)
ERYTHROCYTE [DISTWIDTH] IN BLOOD BY AUTOMATED COUNT: 14.7 % (ref 11.5–14.5)
GFR SERPL CREATININE-BSD FRML MDRD: 33 ML/MIN/1.73SQ M
GLUCOSE SERPL-MCNC: 80 MG/DL (ref 65–99)
HCT VFR BLD AUTO: 37.1 % (ref 42–47)
HGB BLD-MCNC: 12.2 G/DL (ref 14–18)
LYMPHOCYTES # BLD AUTO: 1.6 THOUSANDS/ΜL (ref 0.6–4.47)
LYMPHOCYTES NFR BLD AUTO: 20 % (ref 21–51)
MCH RBC QN AUTO: 30.9 PG (ref 26–34)
MCHC RBC AUTO-ENTMCNC: 32.9 G/DL (ref 31–37)
MCV RBC AUTO: 94 FL (ref 81–99)
MONOCYTES # BLD AUTO: 0.8 THOUSAND/ΜL (ref 0.17–1.22)
MONOCYTES NFR BLD AUTO: 11 % (ref 2–12)
NEUTROPHILS # BLD AUTO: 5.2 THOUSANDS/ΜL (ref 1.4–6.5)
NEUTS SEG NFR BLD AUTO: 67 % (ref 42–75)
PLATELET # BLD AUTO: 230 THOUSANDS/UL (ref 149–390)
PMV BLD AUTO: 9 FL (ref 8.6–11.7)
POTASSIUM SERPL-SCNC: 4.8 MMOL/L (ref 3.5–5.5)
PROT SERPL-MCNC: 7.2 G/DL (ref 6.4–8.9)
RBC # BLD AUTO: 3.94 MILLION/UL (ref 4.3–5.9)
SODIUM SERPL-SCNC: 137 MMOL/L (ref 134–143)
TROPONIN I SERPL-MCNC: 0.03 NG/ML
TSH SERPL DL<=0.05 MIU/L-ACNC: 2.6 UIU/ML (ref 0.45–5.33)
WBC # BLD AUTO: 7.9 THOUSAND/UL (ref 4.8–10.8)

## 2020-10-29 PROCEDURE — 72125 CT NECK SPINE W/O DYE: CPT

## 2020-10-29 PROCEDURE — G1004 CDSM NDSC: HCPCS

## 2020-10-29 PROCEDURE — 99285 EMERGENCY DEPT VISIT HI MDM: CPT | Performed by: EMERGENCY MEDICINE

## 2020-10-29 PROCEDURE — 84484 ASSAY OF TROPONIN QUANT: CPT | Performed by: EMERGENCY MEDICINE

## 2020-10-29 PROCEDURE — 99284 EMERGENCY DEPT VISIT MOD MDM: CPT

## 2020-10-29 PROCEDURE — 71045 X-RAY EXAM CHEST 1 VIEW: CPT

## 2020-10-29 PROCEDURE — 93005 ELECTROCARDIOGRAM TRACING: CPT

## 2020-10-29 PROCEDURE — 70450 CT HEAD/BRAIN W/O DYE: CPT

## 2020-10-29 PROCEDURE — 84443 ASSAY THYROID STIM HORMONE: CPT | Performed by: EMERGENCY MEDICINE

## 2020-10-29 PROCEDURE — 36415 COLL VENOUS BLD VENIPUNCTURE: CPT | Performed by: EMERGENCY MEDICINE

## 2020-10-29 PROCEDURE — 80053 COMPREHEN METABOLIC PANEL: CPT | Performed by: EMERGENCY MEDICINE

## 2020-10-29 PROCEDURE — 85025 COMPLETE CBC W/AUTO DIFF WBC: CPT | Performed by: EMERGENCY MEDICINE

## 2020-10-30 LAB
ATRIAL RATE: 267 BPM
QRS AXIS: -38 DEGREES
QRSD INTERVAL: 72 MS
QT INTERVAL: 462 MS
QTC INTERVAL: 433 MS
T WAVE AXIS: 47 DEGREES
VENTRICULAR RATE: 53 BPM

## 2020-10-30 PROCEDURE — 93010 ELECTROCARDIOGRAM REPORT: CPT | Performed by: INTERNAL MEDICINE

## 2020-12-24 DIAGNOSIS — Z51.5 HOSPICE CARE: Primary | ICD-10-CM

## 2020-12-24 RX ORDER — MORPHINE SULFATE 100 MG/5ML
5 SOLUTION, CONCENTRATE ORAL EVERY 2 HOUR PRN
Qty: 30 ML | Refills: 0 | Status: SHIPPED | OUTPATIENT
Start: 2020-12-24